# Patient Record
Sex: MALE | Race: WHITE | Employment: OTHER | URBAN - METROPOLITAN AREA
[De-identification: names, ages, dates, MRNs, and addresses within clinical notes are randomized per-mention and may not be internally consistent; named-entity substitution may affect disease eponyms.]

---

## 2019-02-18 ENCOUNTER — OFFICE VISIT (OUTPATIENT)
Dept: ORTHOPEDIC SURGERY | Age: 69
End: 2019-02-18
Payer: MEDICARE

## 2019-02-18 VITALS
HEIGHT: 69 IN | WEIGHT: 200 LBS | HEART RATE: 63 BPM | SYSTOLIC BLOOD PRESSURE: 158 MMHG | TEMPERATURE: 97.6 F | DIASTOLIC BLOOD PRESSURE: 89 MMHG | BODY MASS INDEX: 29.62 KG/M2

## 2019-02-18 DIAGNOSIS — M25.511 RIGHT SHOULDER PAIN, UNSPECIFIED CHRONICITY: Primary | ICD-10-CM

## 2019-02-18 PROCEDURE — 1101F PT FALLS ASSESS-DOCD LE1/YR: CPT | Performed by: ORTHOPAEDIC SURGERY

## 2019-02-18 PROCEDURE — G8427 DOCREV CUR MEDS BY ELIG CLIN: HCPCS | Performed by: ORTHOPAEDIC SURGERY

## 2019-02-18 PROCEDURE — G8419 CALC BMI OUT NRM PARAM NOF/U: HCPCS | Performed by: ORTHOPAEDIC SURGERY

## 2019-02-18 PROCEDURE — 1123F ACP DISCUSS/DSCN MKR DOCD: CPT | Performed by: ORTHOPAEDIC SURGERY

## 2019-02-18 PROCEDURE — 3017F COLORECTAL CA SCREEN DOC REV: CPT | Performed by: ORTHOPAEDIC SURGERY

## 2019-02-18 PROCEDURE — 99203 OFFICE O/P NEW LOW 30 MIN: CPT | Performed by: ORTHOPAEDIC SURGERY

## 2019-02-18 PROCEDURE — G8484 FLU IMMUNIZE NO ADMIN: HCPCS | Performed by: ORTHOPAEDIC SURGERY

## 2019-02-18 PROCEDURE — 1036F TOBACCO NON-USER: CPT | Performed by: ORTHOPAEDIC SURGERY

## 2019-02-18 PROCEDURE — 4040F PNEUMOC VAC/ADMIN/RCVD: CPT | Performed by: ORTHOPAEDIC SURGERY

## 2019-02-18 PROCEDURE — 20610 DRAIN/INJ JOINT/BURSA W/O US: CPT | Performed by: ORTHOPAEDIC SURGERY

## 2019-02-18 RX ORDER — TRIAMCINOLONE ACETONIDE 40 MG/ML
40 INJECTION, SUSPENSION INTRA-ARTICULAR; INTRAMUSCULAR ONCE
Status: COMPLETED | OUTPATIENT
Start: 2019-02-18 | End: 2019-02-18

## 2019-02-18 RX ORDER — FLUOXETINE HYDROCHLORIDE 40 MG/1
40 CAPSULE ORAL DAILY
COMMUNITY
Start: 2018-02-12 | End: 2019-06-26 | Stop reason: SDUPTHER

## 2019-02-18 RX ORDER — ATORVASTATIN CALCIUM 10 MG/1
10 TABLET, FILM COATED ORAL DAILY
COMMUNITY
Start: 2016-12-14 | End: 2019-06-26 | Stop reason: SDUPTHER

## 2019-02-18 RX ORDER — LISINOPRIL 20 MG/1
20 TABLET ORAL 2 TIMES DAILY
COMMUNITY

## 2019-02-18 RX ORDER — LIDOCAINE HYDROCHLORIDE 10 MG/ML
2 INJECTION, SOLUTION INFILTRATION; PERINEURAL ONCE
Status: COMPLETED | OUTPATIENT
Start: 2019-02-18 | End: 2019-02-18

## 2019-02-18 RX ORDER — AMLODIPINE BESYLATE 10 MG/1
10 TABLET ORAL DAILY
COMMUNITY
Start: 2016-03-30 | End: 2019-06-26 | Stop reason: SDUPTHER

## 2019-02-18 RX ORDER — OXYCODONE HYDROCHLORIDE AND ACETAMINOPHEN 5; 325 MG/1; MG/1
1 TABLET ORAL 2 TIMES DAILY
COMMUNITY

## 2019-02-18 RX ORDER — ALBUTEROL SULFATE 90 UG/1
AEROSOL, METERED RESPIRATORY (INHALATION) EVERY 4 HOURS PRN
COMMUNITY

## 2019-02-18 RX ORDER — BUPIVACAINE HYDROCHLORIDE 5 MG/ML
2 INJECTION, SOLUTION PERINEURAL ONCE
Status: COMPLETED | OUTPATIENT
Start: 2019-02-18 | End: 2019-02-18

## 2019-02-18 RX ORDER — BUTALBITAL, ASPIRIN, AND CAFFEINE 325; 50; 40 MG/1; MG/1; MG/1
CAPSULE ORAL
COMMUNITY
End: 2019-04-24

## 2019-02-18 RX ORDER — NIACIN 500 MG
TABLET ORAL
COMMUNITY
End: 2019-04-24

## 2019-02-18 RX ORDER — ALENDRONATE SODIUM 70 MG/1
70 TABLET ORAL
COMMUNITY
Start: 2019-01-07 | End: 2019-06-26 | Stop reason: SINTOL

## 2019-02-18 RX ORDER — ATENOLOL 100 MG/1
100 TABLET ORAL EVERY MORNING
COMMUNITY

## 2019-02-18 RX ADMIN — BUPIVACAINE HYDROCHLORIDE 10 MG: 5 INJECTION, SOLUTION PERINEURAL at 14:37

## 2019-02-18 RX ADMIN — LIDOCAINE HYDROCHLORIDE 2 ML: 10 INJECTION, SOLUTION INFILTRATION; PERINEURAL at 14:37

## 2019-02-18 RX ADMIN — TRIAMCINOLONE ACETONIDE 40 MG: 40 INJECTION, SUSPENSION INTRA-ARTICULAR; INTRAMUSCULAR at 14:37

## 2019-04-01 ENCOUNTER — OFFICE VISIT (OUTPATIENT)
Dept: ORTHOPEDIC SURGERY | Age: 69
End: 2019-04-01
Payer: MEDICARE

## 2019-04-01 VITALS
HEART RATE: 64 BPM | HEIGHT: 69 IN | BODY MASS INDEX: 30.45 KG/M2 | DIASTOLIC BLOOD PRESSURE: 94 MMHG | TEMPERATURE: 98.5 F | SYSTOLIC BLOOD PRESSURE: 172 MMHG | WEIGHT: 205.6 LBS

## 2019-04-01 DIAGNOSIS — M25.511 CHRONIC RIGHT SHOULDER PAIN: Primary | ICD-10-CM

## 2019-04-01 DIAGNOSIS — G89.29 CHRONIC RIGHT SHOULDER PAIN: Primary | ICD-10-CM

## 2019-04-01 PROCEDURE — 1036F TOBACCO NON-USER: CPT | Performed by: ORTHOPAEDIC SURGERY

## 2019-04-01 PROCEDURE — G8427 DOCREV CUR MEDS BY ELIG CLIN: HCPCS | Performed by: ORTHOPAEDIC SURGERY

## 2019-04-01 PROCEDURE — G8417 CALC BMI ABV UP PARAM F/U: HCPCS | Performed by: ORTHOPAEDIC SURGERY

## 2019-04-01 PROCEDURE — 3017F COLORECTAL CA SCREEN DOC REV: CPT | Performed by: ORTHOPAEDIC SURGERY

## 2019-04-01 PROCEDURE — 99213 OFFICE O/P EST LOW 20 MIN: CPT | Performed by: ORTHOPAEDIC SURGERY

## 2019-04-01 PROCEDURE — 1123F ACP DISCUSS/DSCN MKR DOCD: CPT | Performed by: ORTHOPAEDIC SURGERY

## 2019-04-01 PROCEDURE — 4040F PNEUMOC VAC/ADMIN/RCVD: CPT | Performed by: ORTHOPAEDIC SURGERY

## 2019-04-01 NOTE — PROGRESS NOTES
Follow Up Visit     Jose Antonio Myrick returns today for follow up visit regarding Right shoulder pain, with concern for SLAP tear and previous MRI report showing partial thickness rotator cuff tear. He did not have images to review at his last visit. He did get a steroid injection at his last visit on February 18. Tanisha Michael He did note mild improvement, he states that he doesn't feel the \"pins and needles\" and has been doing home exercises. He reports symptoms starting to come back. He has stiffness and pain    Physical Exam:     Height: 5' 9\" (1.753 m), Weight: 205 lb 9.6 oz (93.3 kg) (actual weight 4/1/19), BP: (!) 172/94 (patient states that he took bp meds, denies s/s, stressed)    On exam, forward elevation is about 90° with increased passively to about 130°. Internal rotation is to L1. External rotation is 30°. Mini test reveals pain but no weakness. Speeds test is equivocal. Cass's test is positive for pain deep in the shoulder. Belly press test shows good range of motion with mild pain. There are test reveals mild pain    Controlled Substances Monitoring:      Imaging:  MRI was reviewed on a disc from [facility]. This shows high-grade partial tearing bursal side of the supraspinatus. There is also likely high-grade partial tearing of subscapularis. Maybe some abnormality in the anterior superior labrum consistent with SLAP tear      Assessment: Right shoulder pain with high-grade partial-thickness tears involving the supraspinatus and subscapularis, with probable SLAP tear      Plan:   We discussed his pain today. He just had an injection 6 weeks ago. Not repeat another injection. He elected to continue with home exercises. We also discussed possibility of surgery. He would like to hold off for now.  We will check him back in about 6 weeks to reassess    Kenzie Taylor MD  Orthopaedic Surgery   4/1/19  1:51 PM

## 2019-04-15 LAB
BILIRUBIN, URINE: NORMAL
BILIRUBIN, URINE: NORMAL
BLOOD, URINE: NORMAL
BLOOD, URINE: NORMAL
CLARITY: NORMAL
CLARITY: NORMAL
COLOR: NORMAL
COLOR: NORMAL
GLUCOSE URINE: NORMAL
GLUCOSE URINE: NORMAL
KETONES, URINE: NORMAL
KETONES, URINE: NORMAL
LEUKOCYTE ESTERASE, URINE: NORMAL
LEUKOCYTE ESTERASE, URINE: NORMAL
NITRITE, URINE: NORMAL
NITRITE, URINE: NORMAL
PH UA: NORMAL (ref 4.5–8)
PH UA: NORMAL (ref 4.5–8)
PROTEIN UA: NORMAL
PROTEIN UA: NORMAL
SPECIFIC GRAVITY, URINE: NORMAL
SPECIFIC GRAVITY, URINE: NORMAL
UROBILINOGEN, URINE: NORMAL
UROBILINOGEN, URINE: NORMAL

## 2019-04-24 RX ORDER — ELECTROLYTES/DEXTROSE
1 SOLUTION, ORAL ORAL DAILY
COMMUNITY
End: 2020-01-07 | Stop reason: SDUPTHER

## 2019-04-24 RX ORDER — ACETAMINOPHEN 325 MG/1
650 TABLET ORAL EVERY 6 HOURS PRN
COMMUNITY
End: 2019-09-25

## 2019-04-24 NOTE — PROGRESS NOTES
[facility] PRE-ADMISSION TESTING INSTRUCTIONS    The Preadmission Testing patient is instructed accordingly using the following criteria (check applicable):    ARRIVAL INSTRUCTIONS:  [x] Parking the day of Surgery is located in the Main Entrance lot. Upon entering the door, make an immediate right to the surgery reception desk    [x] Complimentary [place] Parking is available Monday through Friday 6 am to 6 pm    [x] Bring photo ID and insurance card    [] Bring in a copy of Living will or Durable Power of  papers. [x] Please be sure to arrange for responsible adult to provide transportation to and from the hospital    [x] Please arrange for responsible adult to be with you for the 24 hour period post procedure due to having anesthesia      GENERAL INSTRUCTIONS:    [x] Nothing by mouth after midnight, including gum, candy, mints or water    [x] You may brush your teeth, but do not swallow any water    [x] Take medications as instructed with 1-2 oz of water    [x] Stop herbal supplements and vitamins 5 days prior to procedure    [x] Follow preop dosing of blood thinners per physician instructions    [] Take 1/2 dose of evening insulin, but no insulin after midnight    [] No oral diabetic medications after midnight    [] If diabetic and have low blood sugar or feel symptomatic, take 1-2oz apple juice only    [x] Bring inhalers day of surgery    [] Bring C-PAP/ Bi-Pap day of surgery    [] Bring urine specimen day of surgery    [x] Shower or bath with soap, lather and rinse well, AM of Surgery, no lotion, powders or creams to surgical site    [] Follow bowel prep as instructed per surgeon    [x] No tobacco products within 24 hours of surgery     [x] No alcohol or illegal drug use within 24 hours of surgery.     [x] Jewelry, body piercing's, eyeglasses, contact lenses and dentures are not permitted into surgery (bring cases)      [] Please do not wear any nail polish, make up or hair products on the day of surgery    [x] If not already done, you can expect a call from registration    [x] You can expect a call the business day prior to procedure to notify you if your arrival time changes    [x] If you receive a survey after surgery we would greatly appreciate your comments    [] Parent/guardian of a minor must accompany their child and remain on the premises  the entire time they are under our care     [] Pediatric patients may bring favorite toy, blanket or comfort item with them    [] A caregiver or family member must remain with the patient during their stay if they are mentally handicapped, have dementia, disoriented or unable to use a call light or would be a safety concern if left unattended    [x] Please notify surgeon if you develop any illness between now and time of surgery (cold, cough, sore throat, fever, nausea, vomiting) or any signs of infections  including skin, wounds, and dental.    [x]  The Outpatient Pharmacy is available to fill your prescription here on your day of surgery, ask your preop nurse for details    [] Other instructions  EDUCATIONAL MATERIALS PROVIDED:    [] PAT Preoperative Education Packet/Booklet     [] Medication List    [] Fluoroscopy Information Pamphlet    [] Transfusion bracelet applied with instructions    [] Joint replacement video reviewed    [] Shower with soap, lather and rinse well, and use CHG wipes provided the evening before surgery as instructed

## 2019-04-25 ENCOUNTER — ANESTHESIA EVENT (OUTPATIENT)
Dept: OPERATING ROOM | Age: 69
End: 2019-04-25
Payer: MEDICARE

## 2019-04-25 ENCOUNTER — ANESTHESIA (OUTPATIENT)
Dept: OPERATING ROOM | Age: 69
End: 2019-04-25
Payer: MEDICARE

## 2019-04-25 ENCOUNTER — HOSPITAL ENCOUNTER (OUTPATIENT)
Age: 69
Setting detail: OUTPATIENT SURGERY
Discharge: HOME | End: 2019-04-25
Attending: OPHTHALMOLOGY | Admitting: OPHTHALMOLOGY
Payer: MEDICARE

## 2019-04-25 VITALS
HEART RATE: 81 BPM | HEIGHT: 69 IN | DIASTOLIC BLOOD PRESSURE: 87 MMHG | TEMPERATURE: 98.9 F | OXYGEN SATURATION: 95 % | BODY MASS INDEX: 28.88 KG/M2 | WEIGHT: 195 LBS | RESPIRATION RATE: 18 BRPM | SYSTOLIC BLOOD PRESSURE: 162 MMHG

## 2019-04-25 VITALS — OXYGEN SATURATION: 97 % | SYSTOLIC BLOOD PRESSURE: 145 MMHG | DIASTOLIC BLOOD PRESSURE: 74 MMHG

## 2019-04-25 PROCEDURE — 6370000000 HC RX 637 (ALT 250 FOR IP): Performed by: OPHTHALMOLOGY

## 2019-04-25 PROCEDURE — 2720000010 HC SURG SUPPLY STERILE: Performed by: OPHTHALMOLOGY

## 2019-04-25 PROCEDURE — 2500000003 HC RX 250 WO HCPCS: Performed by: OPHTHALMOLOGY

## 2019-04-25 PROCEDURE — 3600000003 HC SURGERY LEVEL 3 BASE: Performed by: OPHTHALMOLOGY

## 2019-04-25 PROCEDURE — 3600000013 HC SURGERY LEVEL 3 ADDTL 15MIN: Performed by: OPHTHALMOLOGY

## 2019-04-25 PROCEDURE — 7100000011 HC PHASE II RECOVERY - ADDTL 15 MIN: Performed by: OPHTHALMOLOGY

## 2019-04-25 PROCEDURE — 3700000001 HC ADD 15 MINUTES (ANESTHESIA): Performed by: OPHTHALMOLOGY

## 2019-04-25 PROCEDURE — 2580000003 HC RX 258: Performed by: NURSE ANESTHETIST, CERTIFIED REGISTERED

## 2019-04-25 PROCEDURE — 3700000000 HC ANESTHESIA ATTENDED CARE: Performed by: OPHTHALMOLOGY

## 2019-04-25 PROCEDURE — 2709999900 HC NON-CHARGEABLE SUPPLY: Performed by: OPHTHALMOLOGY

## 2019-04-25 PROCEDURE — 7100000010 HC PHASE II RECOVERY - FIRST 15 MIN: Performed by: OPHTHALMOLOGY

## 2019-04-25 PROCEDURE — 6360000002 HC RX W HCPCS: Performed by: OPHTHALMOLOGY

## 2019-04-25 PROCEDURE — 6360000002 HC RX W HCPCS: Performed by: NURSE ANESTHETIST, CERTIFIED REGISTERED

## 2019-04-25 RX ORDER — SODIUM CHLORIDE 0.9 % (FLUSH) 0.9 %
10 SYRINGE (ML) INJECTION PRN
Status: CANCELLED | OUTPATIENT
Start: 2019-04-25

## 2019-04-25 RX ORDER — PROPARACAINE HYDROCHLORIDE 5 MG/ML
1 SOLUTION/ DROPS OPHTHALMIC PRN
Status: DISCONTINUED | OUTPATIENT
Start: 2019-04-25 | End: 2019-04-25 | Stop reason: HOSPADM

## 2019-04-25 RX ORDER — ONDANSETRON 2 MG/ML
INJECTION INTRAMUSCULAR; INTRAVENOUS PRN
Status: DISCONTINUED | OUTPATIENT
Start: 2019-04-25 | End: 2019-04-25 | Stop reason: SDUPTHER

## 2019-04-25 RX ORDER — TROPICAMIDE 10 MG/ML
1 SOLUTION/ DROPS OPHTHALMIC PRN
Status: COMPLETED | OUTPATIENT
Start: 2019-04-25 | End: 2019-04-25

## 2019-04-25 RX ORDER — LANOLIN ALCOHOL/MO/W.PET/CERES
500 CREAM (GRAM) TOPICAL NIGHTLY
COMMUNITY

## 2019-04-25 RX ORDER — FENTANYL CITRATE 50 UG/ML
INJECTION, SOLUTION INTRAMUSCULAR; INTRAVENOUS PRN
Status: DISCONTINUED | OUTPATIENT
Start: 2019-04-25 | End: 2019-04-25 | Stop reason: SDUPTHER

## 2019-04-25 RX ORDER — SODIUM CHLORIDE 0.9 % (FLUSH) 0.9 %
10 SYRINGE (ML) INJECTION PRN
Status: DISCONTINUED | OUTPATIENT
Start: 2019-04-25 | End: 2019-04-25 | Stop reason: HOSPADM

## 2019-04-25 RX ORDER — SODIUM CHLORIDE 0.9 % (FLUSH) 0.9 %
10 SYRINGE (ML) INJECTION EVERY 12 HOURS SCHEDULED
Status: CANCELLED | OUTPATIENT
Start: 2019-04-25

## 2019-04-25 RX ORDER — DEXAMETHASONE SODIUM PHOSPHATE 10 MG/ML
INJECTION INTRAMUSCULAR; INTRAVENOUS PRN
Status: DISCONTINUED | OUTPATIENT
Start: 2019-04-25 | End: 2019-04-25 | Stop reason: ALTCHOICE

## 2019-04-25 RX ORDER — CEFAZOLIN SODIUM 1 G/3ML
INJECTION, POWDER, FOR SOLUTION INTRAMUSCULAR; INTRAVENOUS PRN
Status: DISCONTINUED | OUTPATIENT
Start: 2019-04-25 | End: 2019-04-25 | Stop reason: ALTCHOICE

## 2019-04-25 RX ORDER — ATROPINE SULFATE 10 MG/ML
SOLUTION/ DROPS OPHTHALMIC PRN
Status: DISCONTINUED | OUTPATIENT
Start: 2019-04-25 | End: 2019-04-25 | Stop reason: ALTCHOICE

## 2019-04-25 RX ORDER — SODIUM CHLORIDE 0.9 % (FLUSH) 0.9 %
10 SYRINGE (ML) INJECTION EVERY 12 HOURS SCHEDULED
Status: DISCONTINUED | OUTPATIENT
Start: 2019-04-25 | End: 2019-04-25 | Stop reason: HOSPADM

## 2019-04-25 RX ORDER — SODIUM CHLORIDE, SODIUM LACTATE, POTASSIUM CHLORIDE, CALCIUM CHLORIDE 600; 310; 30; 20 MG/100ML; MG/100ML; MG/100ML; MG/100ML
INJECTION, SOLUTION INTRAVENOUS CONTINUOUS PRN
Status: DISCONTINUED | OUTPATIENT
Start: 2019-04-25 | End: 2019-04-25 | Stop reason: SDUPTHER

## 2019-04-25 RX ORDER — CYCLOPENTOLATE HYDROCHLORIDE 10 MG/ML
1 SOLUTION/ DROPS OPHTHALMIC PRN
Status: COMPLETED | OUTPATIENT
Start: 2019-04-25 | End: 2019-04-25

## 2019-04-25 RX ORDER — MIDAZOLAM HYDROCHLORIDE 1 MG/ML
INJECTION INTRAMUSCULAR; INTRAVENOUS PRN
Status: DISCONTINUED | OUTPATIENT
Start: 2019-04-25 | End: 2019-04-25 | Stop reason: SDUPTHER

## 2019-04-25 RX ORDER — PHENYLEPHRINE HCL 2.5 %
1 DROPS OPHTHALMIC (EYE) PRN
Status: COMPLETED | OUTPATIENT
Start: 2019-04-25 | End: 2019-04-25

## 2019-04-25 RX ORDER — PHENYLEPHRINE HYDROCHLORIDE 100 MG/ML
1 SOLUTION/ DROPS OPHTHALMIC PRN
Status: DISCONTINUED | OUTPATIENT
Start: 2019-04-25 | End: 2019-04-25 | Stop reason: HOSPADM

## 2019-04-25 RX ORDER — ONDANSETRON 2 MG/ML
4 INJECTION INTRAMUSCULAR; INTRAVENOUS EVERY 6 HOURS PRN
Status: CANCELLED | OUTPATIENT
Start: 2019-04-25

## 2019-04-25 RX ADMIN — Medication 1 DROP: at 10:26

## 2019-04-25 RX ADMIN — PHENYLEPHRINE HYDROCHLORIDE 1 DROP: 25 SOLUTION/ DROPS OPHTHALMIC at 11:00

## 2019-04-25 RX ADMIN — Medication 1 DROP: at 11:00

## 2019-04-25 RX ADMIN — Medication 1 DROP: at 10:33

## 2019-04-25 RX ADMIN — Medication 1 DROP: at 10:46

## 2019-04-25 RX ADMIN — SODIUM CHLORIDE, POTASSIUM CHLORIDE, SODIUM LACTATE AND CALCIUM CHLORIDE: 600; 310; 30; 20 INJECTION, SOLUTION INTRAVENOUS at 12:00

## 2019-04-25 RX ADMIN — PHENYLEPHRINE HYDROCHLORIDE 1 DROP: 25 SOLUTION/ DROPS OPHTHALMIC at 10:46

## 2019-04-25 RX ADMIN — PHENYLEPHRINE HYDROCHLORIDE 1 DROP: 25 SOLUTION/ DROPS OPHTHALMIC at 10:33

## 2019-04-25 RX ADMIN — MIDAZOLAM HYDROCHLORIDE 0.5 MG: 1 INJECTION, SOLUTION INTRAMUSCULAR; INTRAVENOUS at 12:06

## 2019-04-25 RX ADMIN — MIDAZOLAM HYDROCHLORIDE 1.5 MG: 1 INJECTION, SOLUTION INTRAMUSCULAR; INTRAVENOUS at 12:01

## 2019-04-25 RX ADMIN — FENTANYL CITRATE 75 MCG: 50 INJECTION, SOLUTION INTRAMUSCULAR; INTRAVENOUS at 12:01

## 2019-04-25 RX ADMIN — FENTANYL CITRATE 25 MCG: 50 INJECTION, SOLUTION INTRAMUSCULAR; INTRAVENOUS at 12:05

## 2019-04-25 RX ADMIN — ONDANSETRON HYDROCHLORIDE 4 MG: 2 INJECTION, SOLUTION INTRAMUSCULAR; INTRAVENOUS at 12:12

## 2019-04-25 ASSESSMENT — PAIN DESCRIPTION - ORIENTATION
ORIENTATION: RIGHT

## 2019-04-25 ASSESSMENT — PAIN DESCRIPTION - PAIN TYPE
TYPE: SURGICAL PAIN

## 2019-04-25 ASSESSMENT — PAIN - FUNCTIONAL ASSESSMENT: PAIN_FUNCTIONAL_ASSESSMENT: 0-10

## 2019-04-25 ASSESSMENT — PAIN DESCRIPTION - DESCRIPTORS
DESCRIPTORS: HEADACHE
DESCRIPTORS: HEADACHE

## 2019-04-25 ASSESSMENT — PAIN SCALES - GENERAL
PAINLEVEL_OUTOF10: 0
PAINLEVEL_OUTOF10: 0
PAINLEVEL_OUTOF10: 7
PAINLEVEL_OUTOF10: 0

## 2019-04-25 ASSESSMENT — PAIN DESCRIPTION - LOCATION
LOCATION: EYE

## 2019-04-25 NOTE — ANESTHESIA PRE PROCEDURE
Department of Anesthesiology  Preprocedure Note       Name:  Kulwinder Cardona   Age:  80 y.o.  :  1950                                          MRN:  [id number]         Date:  2019      Surgeon: Arcelia eTe):  Navjot Coker MD    Procedure: RIGHT EYE RETINAL DETACHMENT REPAIR, PARS PLANA VITRECTOMY 25 GAUGE, ENDO LASER GAS FLUID EXCHANGE++LATEX ALLERGY++ (Right Eye)    Medications prior to admission:   Prior to Admission medications    Medication Sig Start Date End Date Taking? Authorizing Provider   NONFORMULARY Take by mouth Acetaminophen 325/butalb 50/cafn 40mg   Yes Historical Provider, MD   niacin 500 MG extended release capsule Take 500 mg by mouth nightly   Yes Historical Provider, MD   acetaminophen (TYLENOL) 325 MG tablet Take 650 mg by mouth every 6 hours as needed for Pain   Yes Historical Provider, MD   aspirin 81 MG tablet Take 81 mg by mouth daily Pt choice. Yes Historical Provider, MD   Multiple Vitamins-Minerals (MULTIVITAMIN ADULT) TABS Take 1 tablet by mouth daily   Yes Historical Provider, MD   albuterol sulfate HFA (PROVENTIL HFA) 108 (90 Base) MCG/ACT inhaler Inhale into the lungs every 4 hours as needed Use am dos if needed and bring dos   Yes Historical Provider, MD   oxyCODONE-acetaminophen (PERCOCET) 5-325 MG per tablet Take 1 tablet by mouth 2 times daily. Headaches.  Take am dos    Yes Historical Provider, MD   lisinopril (PRINIVIL;ZESTRIL) 20 MG tablet Take 20 mg by mouth 2 times daily Take am dos    Yes Historical Provider, MD   alendronate (FOSAMAX) 70 MG tablet Take 70 mg by mouth every 7 days  19  Yes Historical Provider, MD   FLUoxetine (PROZAC) 40 MG capsule Take 40 mg by mouth daily Take am [facility] 18  Yes Historical Provider, MD   atenolol (TENORMIN) 100 MG tablet Take 100 mg by mouth daily Take am dos    Yes Historical Provider, MD   amLODIPine (NORVASC) 10 MG tablet Take 10 mg by mouth daily Take am [facility] 04/25 3/30/16  Yes Historical Provider, MD   atorvastatin (LIPITOR) 10 MG tablet Take 10 mg by mouth daily  12/14/16  Yes Historical Provider, MD       Current medications:    Current Facility-Administered Medications   Medication Dose Route Frequency Provider Last Rate Last Dose    sodium chloride flush 0.9 % injection 10 mL  10 mL Intravenous 2 times per day Jenn Goel MD        sodium chloride flush 0.9 % injection 10 mL  10 mL Intravenous PRN Jenn Goel MD        proparacaine (ALCAINE) 0.5 % ophthalmic solution 1 drop  1 drop Right Eye PRN Jenn Goel MD   1 drop at 04/25/19 1026    phenylephrine (SHASTA-SYNEPHRINE) 10 % ophthalmic solution 1 drop  1 drop Right Eye PRN Jenn Goel MD           Allergies: Allergies   Allergen Reactions    Latex Rash and Other (See Comments)     Rash with burning, pain with adhesives    Fentanyl Nausea And Vomiting and Anxiety       Problem List:  There is no problem list on file for this patient.       Past Medical History:        Diagnosis Date    Asthma     Depression     Hyperlipidemia     Hypertension     Migraine     Osteoporosis     Right retinal detachment 04/2019       Past Surgical History:        Procedure Laterality Date    FACIAL RECONSTRUCTION SURGERY      NECK SURGERY  1994    TONSILLECTOMY AND ADENOIDECTOMY      TOOTH EXTRACTION      WISDOM TOOTH EXTRACTION         Social History:    Social History     Tobacco Use    Smoking status: Never Smoker    Smokeless tobacco: Never Used   Substance Use Topics    Alcohol use: Yes     Comment: occ                                 Counseling given: Not Answered      Vital Signs (Current):   Vitals:    04/24/19 1518 04/25/19 1012 04/25/19 1032 04/25/19 1033   BP:   (!) 160/87    Pulse:   86    Resp:  16 16    Temp:   98.8 °F (37.1 °C)    TempSrc:   Temporal    SpO2:   97%    Weight: 195 lb (88.5 kg)   195 lb (88.5 kg)   Height: 5' 8\" (1.727 m)   5' 9\" (1.753 m)                                              BP Readings from Last use)  Induction: intravenous. Anesthetic plan and risks discussed with patient. Plan discussed with CRNA.           [address],    4/25/2019

## 2019-04-25 NOTE — PROGRESS NOTES
1327 admitted to stage 2 pacu Dr Sd Abbott spoke to pt and his wife .   1334 up to recliner chair tolerating well taking po well

## 2019-04-26 NOTE — OP NOTE
core vitrectomy was then carried out. The vitreous was  already detached. The vitreous was then trimmed in a skirt-like fashion  for 360 degrees with scleral-depressed vitrectomy inferiorly. After  this was done, scleral-depressed examination revealed two small tears at  12 o'clock in attached retina and a larger tear with subretinal fluid  approximately at 1 o'clock. There was a radial tear at 10:30 with  retinal detachment in the superotemporal quadrant of approximately 25%. The macula was on with the fluid just over one disc diameter away from  the central fovea. The break was marked with cautery. Endolaser  was placed around the areas of attached retina, where these retinal  tears were as well as between the vitreous base and ora margo in the  attached area of the retina. After this was done, air-fluid exchange  was performed around the radial tear, which was marked with cautery. The retina was found to flatten very nicely with drainage through the  causative break. After this was done, endolaser was placed around the  radial tear as well as to the vitreous base and the ora margo in the  areas of previously detached retina. After this was done, the retina  was very nicely flattened with a good laser. The trocars were then  removed. The superotemporal and superonasal sclerotomy wounds leaked  with pressure of approximately 15; therefore, suture was placed on each  of these sclerotomies. After this was done and then the inferotemporal  sclerotomy closed on its own at a pressure of approximately 15, there  was a good gas fill of 18% SF6 approximately after 40 mL flush. Subconjunctival dexamethasone and Ancef were injected into the  conjunctiva. TobraDex ointment was placed in the eye. The eye was then  patched and shielded and the patient returned to the recovery room in  stable condition. There were no complications.         Erica Morse MD    D: 04/25/2019 13:19:49       T: 04/25/2019 21:34:48     [email or web address]  Job#: [id number]     Doc#: [id number]    CC:

## 2019-06-26 ENCOUNTER — OFFICE VISIT (OUTPATIENT)
Dept: FAMILY MEDICINE CLINIC | Age: 69
End: 2019-06-26
Payer: MEDICARE

## 2019-06-26 VITALS
HEIGHT: 69 IN | HEART RATE: 68 BPM | BODY MASS INDEX: 29.81 KG/M2 | SYSTOLIC BLOOD PRESSURE: 136 MMHG | DIASTOLIC BLOOD PRESSURE: 80 MMHG | OXYGEN SATURATION: 98 % | WEIGHT: 201.25 LBS | TEMPERATURE: 98.8 F

## 2019-06-26 DIAGNOSIS — M54.50 CHRONIC BILATERAL LOW BACK PAIN WITHOUT SCIATICA: ICD-10-CM

## 2019-06-26 DIAGNOSIS — I10 BENIGN ESSENTIAL HYPERTENSION: Primary | ICD-10-CM

## 2019-06-26 DIAGNOSIS — E78.2 MIXED HYPERLIPIDEMIA: ICD-10-CM

## 2019-06-26 DIAGNOSIS — R51.9 CHRONIC NONINTRACTABLE HEADACHE, UNSPECIFIED HEADACHE TYPE: ICD-10-CM

## 2019-06-26 DIAGNOSIS — G89.29 CHRONIC BILATERAL LOW BACK PAIN WITHOUT SCIATICA: ICD-10-CM

## 2019-06-26 DIAGNOSIS — Z12.11 SCREENING FOR MALIGNANT NEOPLASM OF COLON: ICD-10-CM

## 2019-06-26 DIAGNOSIS — M75.111 NONTRAUMATIC INCOMPLETE TEAR OF RIGHT ROTATOR CUFF: ICD-10-CM

## 2019-06-26 DIAGNOSIS — G89.29 CHRONIC NONINTRACTABLE HEADACHE, UNSPECIFIED HEADACHE TYPE: ICD-10-CM

## 2019-06-26 DIAGNOSIS — G89.4 CHRONIC PAIN DISORDER: ICD-10-CM

## 2019-06-26 DIAGNOSIS — F34.1 DYSTHYMIA: ICD-10-CM

## 2019-06-26 DIAGNOSIS — M81.8 OTHER OSTEOPOROSIS WITHOUT CURRENT PATHOLOGICAL FRACTURE: ICD-10-CM

## 2019-06-26 DIAGNOSIS — M50.90 CERVICAL DISC DISEASE: ICD-10-CM

## 2019-06-26 DIAGNOSIS — N18.2 STAGE 2 CHRONIC KIDNEY DISEASE: ICD-10-CM

## 2019-06-26 PROCEDURE — 99214 OFFICE O/P EST MOD 30 MIN: CPT | Performed by: INTERNAL MEDICINE

## 2019-06-26 RX ORDER — AMLODIPINE BESYLATE 10 MG/1
10 TABLET ORAL DAILY
Qty: 30 TABLET | Refills: 5 | Status: SHIPPED | OUTPATIENT
Start: 2019-06-26 | End: 2020-01-02 | Stop reason: SDUPTHER

## 2019-06-26 RX ORDER — BUTALBITAL, ACETAMINOPHEN AND CAFFEINE 50; 325; 40 MG/1; MG/1; MG/1
1 TABLET ORAL EVERY 4 HOURS PRN
COMMUNITY

## 2019-06-26 RX ORDER — ATORVASTATIN CALCIUM 10 MG/1
10 TABLET, FILM COATED ORAL DAILY
Qty: 30 TABLET | Refills: 5 | Status: SHIPPED | OUTPATIENT
Start: 2019-06-26 | End: 2020-01-02 | Stop reason: SDUPTHER

## 2019-06-26 RX ORDER — ZOLEDRONIC ACID 5 MG/100ML
5 INJECTION, SOLUTION INTRAVENOUS ONCE
Qty: 100 ML | Refills: 0 | Status: SHIPPED | OUTPATIENT
Start: 2019-06-26 | End: 2020-01-07 | Stop reason: ALTCHOICE

## 2019-06-26 RX ORDER — FLUOXETINE HYDROCHLORIDE 40 MG/1
40 CAPSULE ORAL DAILY
Qty: 30 CAPSULE | Refills: 5 | Status: SHIPPED | OUTPATIENT
Start: 2019-06-26 | End: 2020-01-02 | Stop reason: SDUPTHER

## 2019-06-26 RX ORDER — PREDNISOLONE ACETATE 10 MG/ML
1 SUSPENSION/ DROPS OPHTHALMIC DAILY
Refills: 2 | COMMUNITY
Start: 2019-05-26 | End: 2020-06-24

## 2019-06-26 ASSESSMENT — PATIENT HEALTH QUESTIONNAIRE - PHQ9
1. LITTLE INTEREST OR PLEASURE IN DOING THINGS: 1
2. FEELING DOWN, DEPRESSED OR HOPELESS: 1
SUM OF ALL RESPONSES TO PHQ9 QUESTIONS 1 & 2: 2
SUM OF ALL RESPONSES TO PHQ QUESTIONS 1-9: 2
SUM OF ALL RESPONSES TO PHQ QUESTIONS 1-9: 2

## 2019-06-26 ASSESSMENT — ENCOUNTER SYMPTOMS
RHINORRHEA: 0
NAUSEA: 0
SHORTNESS OF BREATH: 0
BLOOD IN STOOL: 0
ABDOMINAL PAIN: 1
VOMITING: 0
DIARRHEA: 0
COUGH: 0
SORE THROAT: 0
CHEST TIGHTNESS: 0
CONSTIPATION: 0
EYE PAIN: 0

## 2019-06-26 NOTE — PROGRESS NOTES
[facility]) Physicians   Internal Medicine     2019  Nighat Vargas : 1950 Sex: male  Age:72 y.o. Chief Complaint   Patient presents with    Eye Problem     detached retina surgery done on 19     Hypertension        HPI:   Patient presents to office for review and management of chronic medical conditions.    - States since last visit had eye surgery for right detached retina. States following with specialist. Using eye drops at moment. - Bone density scan showed osteoporosis of hip. Started fosamax. States stopped - due to stomach pain. Discussed other option in treatment - different bisphosphonate or prolia. Declines. - State pain to bilateral shoulders. States had right shoulder injection per ortho (2018). States had MRI on right, shows partial tears, fluid and degenerative changes. States  had left shoulder injection (2019), [facility]. Xray of elbow and forearm were ok. Improved     - States has chronic pain management. No longer following with pain management. States has had injections to right neck for right shoulder x 3 and States had Radio frequency ablation x 1. States has helped b/l shoulder pain. States has had MRI on cervical spine through [facility] - multiple osteophytes, disc disease and previous c5-6 fusion.    - States still has lower back pain. Unchanged. No recent trauma or injury. No bowel or bladder incontinence. No fever or chills. No numbness, weakness. States pain as sharp shooting. No radicular pain. - States has hypertension. States does check blood pressure at home, blood pressure 120-130's/70-80. Occasional in  140's. - States has depression. On Prozac 40mg - helping. States still having depression, given medical issues and family  issues. States no suicidal thoughts. Discussed counseling - declines. - States has migraines. Up and down. Still asking about stadol.  States has seen neurology - not improved - tried Topamax and Imitrex. Did not tolerate Topamax - switched to amitriptyline - states helped sleeping, also Stopped taking. Taking butalbital/aspirin/caffeine and percocet. - States has asthma and stable with inhaler. States SOB is better. Health Maintenance   - immunizations:   Influenza Vaccination  - (9/14/2015) , (2017)  Pneumonia Vaccination - (10/2016) - prevnar, (10/2017) - pneumococcal 23  Zoster/Shingles Vaccine  Tetanus Vaccination  - (2015) - [facility]    - Screenings:   Bone Density Test Screening - (10/25/2018)  Colonoscopy - (2014) - repeat in 5yrs  Prostate     Couseled on Home Safety - (11/13/2017)    carotid ultrasound - (3/2015) - no significant stenosis    ROS:  Review of Systems   Constitutional: Negative for appetite change, chills, fever and unexpected weight change. HENT: Negative for congestion, rhinorrhea and sore throat. Eyes: Positive for visual disturbance. Negative for pain. Respiratory: Negative for cough, chest tightness and shortness of breath. Cardiovascular: Negative for chest pain and palpitations. Gastrointestinal: Positive for abdominal pain. Negative for blood in stool, constipation, diarrhea, nausea and vomiting. Genitourinary: Negative for difficulty urinating, dysuria, hematuria, scrotal swelling, testicular pain and urgency. Musculoskeletal: Negative for arthralgias and gait problem. Skin: Negative for rash. Neurological: Positive for headaches. Negative for dizziness, syncope, weakness and light-headedness. Hematological: Negative for adenopathy. Does not bruise/bleed easily. Psychiatric/Behavioral: Negative for suicidal ideas. The patient is not nervous/anxious.           Current Outpatient Medications:     butalbital-acetaminophen-caffeine (FIORICET, ESGIC) -40 MG per tablet, Take 1 tablet by mouth every 4 hours as needed for Headaches, Disp: , Rfl:     amLODIPine (NORVASC) 10 MG tablet, Take 1 tablet by mouth daily Take am dos 04/25, Disp: 30 tablet, Rfl: 5    atorvastatin (LIPITOR) 10 MG tablet, Take 1 tablet by mouth daily, Disp: 30 tablet, Rfl: 5    FLUoxetine (PROZAC) 40 MG capsule, Take 1 capsule by mouth daily Take am dos 04/25, Disp: 30 capsule, Rfl: 5    zoledronic acid (RECLAST) 5 MG/100ML SOLN, Infuse 100 mLs intravenously once for 1 dose, Disp: 100 mL, Rfl: 0    niacin 500 MG extended release capsule, Take 500 mg by mouth nightly, Disp: , Rfl:     acetaminophen (TYLENOL) 325 MG tablet, Take 650 mg by mouth every 6 hours as needed for Pain, Disp: , Rfl:     aspirin 81 MG tablet, Take 81 mg by mouth daily Pt choice. , Disp: , Rfl:     Multiple Vitamins-Minerals (MULTIVITAMIN ADULT) TABS, Take 1 tablet by mouth daily, Disp: , Rfl:     albuterol sulfate HFA (PROVENTIL HFA) 108 (90 Base) MCG/ACT inhaler, Inhale into the lungs every 4 hours as needed Use am dos if needed and bring dos, Disp: , Rfl:     oxyCODONE-acetaminophen (PERCOCET) 5-325 MG per tablet, Take 1 tablet by mouth 2 times daily. Headaches.  Take am dos 04/25, Disp: , Rfl:     lisinopril (PRINIVIL;ZESTRIL) 20 MG tablet, Take 20 mg by mouth 2 times daily Take am dos 04/25, Disp: , Rfl:     atenolol (TENORMIN) 100 MG tablet, Take 100 mg by mouth daily Take am dos 04/25, Disp: , Rfl:     prednisoLONE acetate (PRED FORTE) 1 % ophthalmic suspension, INSTILL 1 DROP IN THE RIGHT EYE FOUR TIMES A DAY., Disp: , Rfl: 2    No Known Allergies    Past Medical History:   Diagnosis Date    Asthma     Depression     Hyperlipidemia     Hypertension     Migraine     Osteoporosis     Right retinal detachment 04/2019       Past Surgical History:   Procedure Laterality Date    FACIAL RECONSTRUCTION SURGERY      NECK SURGERY  1994    RETINAL DETACHMENT SURGERY Right 4/25/2019    RIGHT EYE RETINAL DETACHMENT REPAIR, PARS PLANA VITRECTOMY 25 GAUGE, ENDO LASER GAS FLUID EXCHANGE (SF6)++LATEX ALLERGY++ performed by Ivania England MD at [address] [place] [address]  WISDOM TOOTH EXTRACTION         No family history on file. Social History     Socioeconomic History    Marital status:      Spouse name: Not on file    Number of children: Not on file    Years of education: Not on file    Highest education level: Not on file   Occupational History    Not on file   Social Needs    Financial resource strain: Not on file    Food insecurity:     Worry: Not on file     Inability: Not on file    Transportation needs:     Medical: Not on file     Non-medical: Not on file   Tobacco Use    Smoking status: Never Smoker    Smokeless tobacco: Never Used   Substance and Sexual Activity    Alcohol use: Yes     Comment: occ     Drug use: No    Sexual activity: Not on file   Lifestyle    Physical activity:     Days per week: Not on file     Minutes per session: Not on file    Stress: Not on file   Relationships    Social connections:     Talks on phone: Not on file     Gets together: Not on file     Attends [employer] service: Not on file     Active member of club or organization: Not on file     Attends meetings of clubs or organizations: Not on file     Relationship status: Not on file    Intimate partner violence:     Fear of current or ex partner: Not on file     Emotionally abused: Not on file     Physically abused: Not on file     Forced sexual activity: Not on file   Other Topics Concern    Not on file   Social History Narrative    Not on file       Vitals:    06/26/19 1021   BP: 136/80   Pulse: 68   Temp: 98.8 °F (37.1 °C)   SpO2: 98%   Weight: 201 lb 4 oz (91.3 kg)   Height: 5' 9\" (1.753 m)       Exam:  Physical Exam   Constitutional: He is oriented to person, place, and time. He appears well-developed and well-nourished. HENT:   Head: Normocephalic and atraumatic. Right Ear: External ear normal.   Left Ear: External ear normal.   Mouth/Throat: Oropharynx is clear and moist.   Eyes: Pupils are equal, round, and reactive to light.  EOM are normal. Neck: Normal range of motion. Neck supple. No thyromegaly present. Cardiovascular: Normal rate, regular rhythm and normal heart sounds. No murmur heard. Pulmonary/Chest: Effort normal and breath sounds normal. He has no wheezes. He has no rales. Abdominal: Soft. Bowel sounds are normal. There is no tenderness. There is no rebound and no guarding. Musculoskeletal: He exhibits no edema. Right shoulder: He exhibits decreased range of motion. Left shoulder: He exhibits decreased range of motion. Lumbar back: He exhibits decreased range of motion. Lymphadenopathy:     He has no cervical adenopathy. Neurological: He is alert and oriented to person, place, and time. No cranial nerve deficit. Skin: Skin is warm and dry. Psychiatric: He has a normal mood and affect. Judgment normal.       Assessment and Plan:    Markos Wiggins was seen today for eye problem and hypertension.     Diagnoses and all orders for this visit:    Benign essential hypertension  - continue present treatment  - monitor blood pressure at home  - watch diet  - on amlodipine   - on atenolol   - on lisinopril     Nontraumatic incomplete tear of right rotator cuff  - has been following with pain management - injections   - had abnormality of humerus on right, MRI 2018   - [facility]   - Stable     Cervical disc disease  - previous c5-6 surgery  - weakness of forearm  - Stable and unchanged     Chronic nonintractable headache, unspecified headache type  - continue present treatment  - not currently following with neurology  - did not tolerate Topamax  - off amitriptyline  - has asked for stadol - discussed not recommended  - on fiorcet     Chronic pain disorder  - continue present treatment  - no longer following with pain management - do not accept insurance  - has had injections - poss RFA  - has asked for stadol for migraine    Dysthymia  - continue present treatment  - referral to counseling - declined  - did not tolerate zoloft  - off cymbalta  - back on prozac 40mg  - Stable     Chronic bilateral low back pain without sciatica  - following with pain management  - RFA and injections    Mixed hyperlipidemia  - continue present treatment  - watch diet  - on atrovastatin   - follow labs     Stage 2 chronic kidney disease  - stopped meloxicam  - avoid NSAID  - recheck labs    Other osteoporosis without current pathological fracture  - DEXA (11/2018)  - started fosamax - had to stop due to stomach pain, discussed trying another type, switch to prolia or trying reclast infusion.   - would like to explore reclast         Return in about 3 months (around 9/26/2019) for check up and review.       Orders Placed This Encounter   Procedures    POCT Fit Test     Standing Status:   Future     Standing Expiration Date:   6/26/2020       Jeanette Mclaughlin MD  6/26/2019  1:05 PM

## 2019-06-27 ENCOUNTER — TELEPHONE (OUTPATIENT)
Dept: FAMILY MEDICINE CLINIC | Age: 69
End: 2019-06-27

## 2019-06-27 DIAGNOSIS — N18.2 STAGE 2 CHRONIC KIDNEY DISEASE: Primary | ICD-10-CM

## 2019-06-27 DIAGNOSIS — M81.8 IDIOPATHIC OSTEOPOROSIS: ICD-10-CM

## 2019-06-27 RX ORDER — SODIUM CHLORIDE 0.9 % (FLUSH) 0.9 %
10 SYRINGE (ML) INJECTION PRN
Status: CANCELLED | OUTPATIENT
Start: 2019-06-27

## 2019-06-27 RX ORDER — ZOLEDRONIC ACID 5 MG/100ML
5 INJECTION, SOLUTION INTRAVENOUS ONCE
Status: CANCELLED | OUTPATIENT
Start: 2019-06-27

## 2019-07-01 ENCOUNTER — TELEPHONE (OUTPATIENT)
Dept: FAMILY MEDICINE CLINIC | Age: 69
End: 2019-07-01

## 2019-07-05 LAB
BUN BLDV-MCNC: 32 MG/DL
CALCIUM SERPL-MCNC: 8.5 MG/DL
CHLORIDE BLD-SCNC: 107 MMOL/L
CO2: 24 MMOL/L
CREAT SERPL-MCNC: 1.5 MG/DL
GFR CALCULATED: NORMAL
GLUCOSE BLD-MCNC: 106 MG/DL
POTASSIUM SERPL-SCNC: 4.2 MMOL/L
SODIUM BLD-SCNC: 139 MMOL/L

## 2019-07-09 ENCOUNTER — TELEPHONE (OUTPATIENT)
Dept: FAMILY MEDICINE CLINIC | Age: 69
End: 2019-07-09

## 2019-07-09 NOTE — TELEPHONE ENCOUNTER
Please let the patient know that lab received from [facility] showed Mild kidney dysfunction, potassium and other electrolytes were normal. Thanks

## 2019-07-12 NOTE — PROGRESS NOTES
Received a referral for Reclast along with labs. Called to hospital Pharmacist  to have creatinine clearance calculated. Bertha Dougherty  Pharmacist did calculation and I was told creatinine clearance is 47.0

## 2019-07-15 ENCOUNTER — HOSPITAL ENCOUNTER (OUTPATIENT)
Dept: INFUSION THERAPY | Age: 69
Setting detail: INFUSION SERIES
Discharge: HOME | End: 2019-07-15
Payer: MEDICARE

## 2019-07-15 VITALS
DIASTOLIC BLOOD PRESSURE: 78 MMHG | SYSTOLIC BLOOD PRESSURE: 152 MMHG | HEIGHT: 69 IN | RESPIRATION RATE: 16 BRPM | TEMPERATURE: 98.5 F | WEIGHT: 200 LBS | OXYGEN SATURATION: 96 % | BODY MASS INDEX: 29.62 KG/M2 | HEART RATE: 66 BPM

## 2019-07-15 DIAGNOSIS — M81.8 IDIOPATHIC OSTEOPOROSIS: Primary | ICD-10-CM

## 2019-07-15 PROCEDURE — 2580000003 HC RX 258: Performed by: INTERNAL MEDICINE

## 2019-07-15 PROCEDURE — 96365 THER/PROPH/DIAG IV INF INIT: CPT

## 2019-07-15 PROCEDURE — 6360000002 HC RX W HCPCS: Performed by: INTERNAL MEDICINE

## 2019-07-15 RX ORDER — SODIUM CHLORIDE 0.9 % (FLUSH) 0.9 %
10 SYRINGE (ML) INJECTION PRN
Status: CANCELLED | OUTPATIENT
Start: 2019-07-15

## 2019-07-15 RX ORDER — ZOLEDRONIC ACID 5 MG/100ML
5 INJECTION, SOLUTION INTRAVENOUS ONCE
Status: COMPLETED | OUTPATIENT
Start: 2019-07-15 | End: 2019-07-15

## 2019-07-15 RX ORDER — SODIUM CHLORIDE 0.9 % (FLUSH) 0.9 %
10 SYRINGE (ML) INJECTION PRN
Status: DISCONTINUED | OUTPATIENT
Start: 2019-07-15 | End: 2019-07-16 | Stop reason: HOSPADM

## 2019-07-15 RX ORDER — ZOLEDRONIC ACID 5 MG/100ML
5 INJECTION, SOLUTION INTRAVENOUS ONCE
Status: CANCELLED | OUTPATIENT
Start: 2019-07-15

## 2019-07-15 RX ADMIN — ZOLEDRONIC ACID 5 MG: 5 INJECTION, SOLUTION INTRAVENOUS at 10:23

## 2019-07-15 RX ADMIN — Medication 10 ML: at 10:42

## 2019-07-15 RX ADMIN — Medication 10 ML: at 10:41

## 2019-07-15 RX ADMIN — Medication 10 ML: at 10:15

## 2019-07-15 ASSESSMENT — PAIN DESCRIPTION - LOCATION: LOCATION: BACK;NECK;SHOULDER

## 2019-07-15 ASSESSMENT — PAIN DESCRIPTION - FREQUENCY: FREQUENCY: CONTINUOUS

## 2019-07-15 ASSESSMENT — PAIN DESCRIPTION - ONSET: ONSET: ON-GOING

## 2019-07-15 ASSESSMENT — PAIN SCALES - GENERAL: PAINLEVEL_OUTOF10: 6

## 2019-07-15 ASSESSMENT — PAIN DESCRIPTION - ORIENTATION: ORIENTATION: RIGHT;LEFT

## 2019-07-15 ASSESSMENT — PAIN DESCRIPTION - PAIN TYPE: TYPE: CHRONIC PAIN

## 2019-07-17 ENCOUNTER — TELEPHONE (OUTPATIENT)
Dept: FAMILY MEDICINE CLINIC | Age: 69
End: 2019-07-17

## 2019-08-07 DIAGNOSIS — R19.5 POSITIVE FIT (FECAL IMMUNOCHEMICAL TEST): Primary | ICD-10-CM

## 2019-08-07 DIAGNOSIS — Z12.11 SCREENING FOR MALIGNANT NEOPLASM OF COLON: ICD-10-CM

## 2019-08-07 LAB
CONTROL: ABNORMAL
HEMOCCULT STL QL: POSITIVE

## 2019-08-07 PROCEDURE — 82274 ASSAY TEST FOR BLOOD FECAL: CPT | Performed by: INTERNAL MEDICINE

## 2019-09-25 ENCOUNTER — OFFICE VISIT (OUTPATIENT)
Dept: FAMILY MEDICINE CLINIC | Age: 69
End: 2019-09-25
Payer: MEDICARE

## 2019-09-25 ENCOUNTER — HOSPITAL ENCOUNTER (OUTPATIENT)
Age: 69
Discharge: HOME | End: 2019-09-27
Payer: MEDICARE

## 2019-09-25 VITALS
HEART RATE: 58 BPM | TEMPERATURE: 98.1 F | BODY MASS INDEX: 29.55 KG/M2 | WEIGHT: 199.5 LBS | HEIGHT: 69 IN | SYSTOLIC BLOOD PRESSURE: 148 MMHG | OXYGEN SATURATION: 98 % | DIASTOLIC BLOOD PRESSURE: 88 MMHG

## 2019-09-25 DIAGNOSIS — G89.4 CHRONIC PAIN DISORDER: ICD-10-CM

## 2019-09-25 DIAGNOSIS — R19.5 POSITIVE FIT (FECAL IMMUNOCHEMICAL TEST): Primary | ICD-10-CM

## 2019-09-25 DIAGNOSIS — R10.84 GENERALIZED ABDOMINAL PAIN: ICD-10-CM

## 2019-09-25 DIAGNOSIS — N18.2 STAGE 2 CHRONIC KIDNEY DISEASE: ICD-10-CM

## 2019-09-25 DIAGNOSIS — G89.29 CHRONIC NONINTRACTABLE HEADACHE, UNSPECIFIED HEADACHE TYPE: ICD-10-CM

## 2019-09-25 DIAGNOSIS — M81.8 OTHER OSTEOPOROSIS WITHOUT CURRENT PATHOLOGICAL FRACTURE: ICD-10-CM

## 2019-09-25 DIAGNOSIS — M54.50 CHRONIC BILATERAL LOW BACK PAIN WITHOUT SCIATICA: ICD-10-CM

## 2019-09-25 DIAGNOSIS — E78.2 MIXED HYPERLIPIDEMIA: ICD-10-CM

## 2019-09-25 DIAGNOSIS — M75.111 NONTRAUMATIC INCOMPLETE TEAR OF RIGHT ROTATOR CUFF: ICD-10-CM

## 2019-09-25 DIAGNOSIS — F34.1 DYSTHYMIA: ICD-10-CM

## 2019-09-25 DIAGNOSIS — R51.9 CHRONIC NONINTRACTABLE HEADACHE, UNSPECIFIED HEADACHE TYPE: ICD-10-CM

## 2019-09-25 DIAGNOSIS — Z01.818 PREOP EXAM FOR INTERNAL MEDICINE: ICD-10-CM

## 2019-09-25 DIAGNOSIS — M50.90 CERVICAL DISC DISEASE: ICD-10-CM

## 2019-09-25 DIAGNOSIS — I10 BENIGN ESSENTIAL HYPERTENSION: ICD-10-CM

## 2019-09-25 DIAGNOSIS — M81.8 IDIOPATHIC OSTEOPOROSIS: ICD-10-CM

## 2019-09-25 DIAGNOSIS — G89.29 CHRONIC BILATERAL LOW BACK PAIN WITHOUT SCIATICA: ICD-10-CM

## 2019-09-25 LAB
ALBUMIN SERPL-MCNC: 4.6 G/DL (ref 3.5–5.2)
ALP BLD-CCNC: 43 U/L (ref 40–129)
ALT SERPL-CCNC: 19 U/L (ref 0–40)
ANION GAP SERPL CALCULATED.3IONS-SCNC: 20 MMOL/L (ref 7–16)
AST SERPL-CCNC: 22 U/L (ref 0–39)
BASOPHILS ABSOLUTE: 0.1 E9/L (ref 0–0.2)
BASOPHILS RELATIVE PERCENT: 1 % (ref 0–2)
BILIRUB SERPL-MCNC: 0.3 MG/DL (ref 0–1.2)
BUN BLDV-MCNC: 20 MG/DL (ref 8–23)
CALCIUM SERPL-MCNC: 9.9 MG/DL (ref 8.6–10.2)
CHLORIDE BLD-SCNC: 101 MMOL/L (ref 98–107)
CO2: 21 MMOL/L (ref 22–29)
CREAT SERPL-MCNC: 1.3 MG/DL (ref 0.7–1.2)
EOSINOPHILS ABSOLUTE: 0.73 E9/L (ref 0.05–0.5)
EOSINOPHILS RELATIVE PERCENT: 7.4 % (ref 0–6)
GFR AFRICAN AMERICAN: >60
GFR NON-AFRICAN AMERICAN: 55 ML/MIN/1.73
GLUCOSE BLD-MCNC: 97 MG/DL (ref 74–99)
HCT VFR BLD CALC: 49.1 % (ref 37–54)
HEMOGLOBIN: 16 G/DL (ref 12.5–16.5)
IMMATURE GRANULOCYTES #: 0.04 E9/L
IMMATURE GRANULOCYTES %: 0.4 % (ref 0–5)
LYMPHOCYTES ABSOLUTE: 1.73 E9/L (ref 1.5–4)
LYMPHOCYTES RELATIVE PERCENT: 17.7 % (ref 20–42)
MAGNESIUM: 2.4 MG/DL (ref 1.6–2.6)
MCH RBC QN AUTO: 31.4 PG (ref 26–35)
MCHC RBC AUTO-ENTMCNC: 32.6 % (ref 32–34.5)
MCV RBC AUTO: 96.3 FL (ref 80–99.9)
MONOCYTES ABSOLUTE: 0.75 E9/L (ref 0.1–0.95)
MONOCYTES RELATIVE PERCENT: 7.7 % (ref 2–12)
NEUTROPHILS ABSOLUTE: 6.45 E9/L (ref 1.8–7.3)
NEUTROPHILS RELATIVE PERCENT: 65.8 % (ref 43–80)
PDW BLD-RTO: 13.1 FL (ref 11.5–15)
PLATELET # BLD: 246 E9/L (ref 130–450)
PMV BLD AUTO: 9.3 FL (ref 7–12)
POTASSIUM SERPL-SCNC: 5.9 MMOL/L (ref 3.5–5)
RBC # BLD: 5.1 E12/L (ref 3.8–5.8)
SODIUM BLD-SCNC: 142 MMOL/L (ref 132–146)
TOTAL PROTEIN: 7.3 G/DL (ref 6.4–8.3)
WBC # BLD: 9.8 E9/L (ref 4.5–11.5)

## 2019-09-25 PROCEDURE — 4040F PNEUMOC VAC/ADMIN/RCVD: CPT | Performed by: INTERNAL MEDICINE

## 2019-09-25 PROCEDURE — 83735 ASSAY OF MAGNESIUM: CPT

## 2019-09-25 PROCEDURE — G8427 DOCREV CUR MEDS BY ELIG CLIN: HCPCS | Performed by: INTERNAL MEDICINE

## 2019-09-25 PROCEDURE — G8417 CALC BMI ABV UP PARAM F/U: HCPCS | Performed by: INTERNAL MEDICINE

## 2019-09-25 PROCEDURE — 1123F ACP DISCUSS/DSCN MKR DOCD: CPT | Performed by: INTERNAL MEDICINE

## 2019-09-25 PROCEDURE — 80053 COMPREHEN METABOLIC PANEL: CPT

## 2019-09-25 PROCEDURE — 85025 COMPLETE CBC W/AUTO DIFF WBC: CPT

## 2019-09-25 PROCEDURE — 99214 OFFICE O/P EST MOD 30 MIN: CPT | Performed by: INTERNAL MEDICINE

## 2019-09-25 PROCEDURE — 36415 COLL VENOUS BLD VENIPUNCTURE: CPT

## 2019-09-25 PROCEDURE — 1036F TOBACCO NON-USER: CPT | Performed by: INTERNAL MEDICINE

## 2019-09-25 PROCEDURE — 3017F COLORECTAL CA SCREEN DOC REV: CPT | Performed by: INTERNAL MEDICINE

## 2019-09-25 RX ORDER — OMEPRAZOLE 40 MG/1
40 CAPSULE, DELAYED RELEASE ORAL
Qty: 30 CAPSULE | Refills: 0 | Status: SHIPPED | OUTPATIENT
Start: 2019-09-25 | End: 2019-10-16 | Stop reason: SDUPTHER

## 2019-09-25 ASSESSMENT — ENCOUNTER SYMPTOMS
VOMITING: 0
SORE THROAT: 0
EYE PAIN: 0
COUGH: 0
RHINORRHEA: 0
NAUSEA: 0
CHEST TIGHTNESS: 0
ABDOMINAL PAIN: 1
DIARRHEA: 0
CONSTIPATION: 0
SHORTNESS OF BREATH: 0
BLOOD IN STOOL: 0

## 2019-09-25 NOTE — PROGRESS NOTES
[facility]) Physicians   Internal Medicine     2019  Dorinda Herron : 1950 Sex: male  Age:74 y.o. Chief Complaint   Patient presents with    Pre-op Exam     Right eye cataract Sx 10/11/19     GI Problem     Sharp pains on and off for months. Denies N & V.        HPI:   Patient presents to office for review and management of chronic medical conditions.    - States need preop for cataract     - States having generalized abdominal pain. States comes and goes. States sharp, no radiation. States heating pad makes better. States uncertain what makes it worse. Some nausea, no vomiting. No diarrhea or constipation. No melena or hematochezia, No dysuria. No frequency, urgency. No hematuria. No fever or chills. - States since last visit had eye surgery for right detached retina. States following with specialist. Using eye drops at moment. - Bone density scan showed osteoporosis of hip. Started fosamax. States stopped - due to stomach pain. Discussed other option in treatment - different bisphosphonate or prolia. Declines. - State pain to bilateral shoulders. States had right shoulder injection per ortho (2018). States had MRI on right, shows partial tears, fluid and degenerative changes. States  had left shoulder injection (2019), [facility] Xray of elbow and forearm were ok. Improved     - States has chronic pain management. No longer following with pain management. [facility] has had injections to right neck for right shoulder x 3 and States had Radio frequency ablation x 1. [facility] has helped b/l shoulder pain. [facility] has had MRI on cervical spine through [place] - multiple osteophytes, disc disease and previous c5-6 fusion.    - States still has lower back pain. Unchanged. No recent trauma or injury. No bowel or bladder incontinence. No fever or chills. No numbness, weakness. States pain as sharp shooting. No radicular pain. - States has hypertension.  States does check

## 2019-09-26 ENCOUNTER — TELEPHONE (OUTPATIENT)
Dept: FAMILY MEDICINE CLINIC | Age: 69
End: 2019-09-26

## 2019-09-27 NOTE — TELEPHONE ENCOUNTER
[id number] Diana Montgomery thank you!   Sorry for the inconvenience but did not want to take a chance bonny with abdominal pain

## 2019-10-04 ENCOUNTER — TELEPHONE (OUTPATIENT)
Dept: FAMILY MEDICINE CLINIC | Age: 69
End: 2019-10-04

## 2019-10-04 DIAGNOSIS — R10.84 GENERALIZED ABDOMINAL PAIN: ICD-10-CM

## 2019-10-04 DIAGNOSIS — R19.5 POSITIVE FIT (FECAL IMMUNOCHEMICAL TEST): ICD-10-CM

## 2019-10-08 LAB
ALBUMIN SERPL-MCNC: 3.9 G/DL
ALP BLD-CCNC: 45 U/L
ALT SERPL-CCNC: 27 U/L
ANION GAP SERPL CALCULATED.3IONS-SCNC: 10 MMOL/L
AST SERPL-CCNC: 17 U/L
BASOPHILS ABSOLUTE: 0.1 /ΜL
BASOPHILS RELATIVE PERCENT: 1 %
BILIRUB SERPL-MCNC: 1 MG/DL (ref 0.1–1.4)
BUN BLDV-MCNC: 17 MG/DL
CALCIUM SERPL-MCNC: 8.7 MG/DL
CHLORIDE BLD-SCNC: 109 MMOL/L
CO2: 28 MMOL/L
CREAT SERPL-MCNC: 1.1 MG/DL
EOSINOPHILS ABSOLUTE: 0.8 /ΜL
EOSINOPHILS RELATIVE PERCENT: 8.3 %
GFR CALCULATED: 67
GLUCOSE BLD-MCNC: 92 MG/DL
HCT VFR BLD CALC: 44.6 % (ref 41–53)
HEMOGLOBIN: 15.3 G/DL (ref 13.5–17.5)
LYMPHOCYTES ABSOLUTE: 1.5 /ΜL
LYMPHOCYTES RELATIVE PERCENT: 15.8 %
MCH RBC QN AUTO: 31.3 PG
MCHC RBC AUTO-ENTMCNC: 34.3 G/DL
MCV RBC AUTO: 91.1 FL
MONOCYTES ABSOLUTE: 0.6 /ΜL
MONOCYTES RELATIVE PERCENT: 6.3 %
NEUTROPHILS ABSOLUTE: 6.3 /ΜL
NEUTROPHILS RELATIVE PERCENT: 68.6 %
PDW BLD-RTO: 13.3 %
PLATELET # BLD: 232 K/ΜL
PMV BLD AUTO: 8.1 FL
POTASSIUM SERPL-SCNC: 4.5 MMOL/L
RBC # BLD: 4.89 10^6/ΜL
SODIUM BLD-SCNC: 142 MMOL/L
TOTAL PROTEIN: 6.4
WBC # BLD: 9.2 10^3/ML

## 2019-10-09 LAB
6-ACETYLMORPHINE, UR: NORMAL
AMPHETAMINE SCREEN, URINE: NORMAL
BARBITURATE SCREEN, URINE: NORMAL
BENZODIAZEPINE SCREEN, URINE: NORMAL
CANNABINOID SCREEN URINE: NORMAL
COCAINE METABOLITE, URINE: NORMAL
CREATININE URINE: NORMAL
EDDP, URINE: NORMAL
ETHANOL URINE: NORMAL
MDMA URINE: NORMAL
METHADONE SCREEN, URINE: NORMAL
METHAMPHETAMINE, URINE: NORMAL
OPIATES, URINE: NORMAL
OXYCODONE: NORMAL
PCP: NORMAL
PH, URINE: NORMAL
PHENCYCLIDINE, URINE: NORMAL
PROPOXYPHENE, URINE: NORMAL
TRICYCLIC ANTIDEPRESSANTS, UR: NORMAL

## 2019-10-16 DIAGNOSIS — R19.5 POSITIVE FIT (FECAL IMMUNOCHEMICAL TEST): ICD-10-CM

## 2019-10-16 DIAGNOSIS — R10.84 GENERALIZED ABDOMINAL PAIN: ICD-10-CM

## 2019-10-16 RX ORDER — OMEPRAZOLE 40 MG/1
40 CAPSULE, DELAYED RELEASE ORAL
Qty: 30 CAPSULE | Refills: 1 | Status: SHIPPED | OUTPATIENT
Start: 2019-10-16 | End: 2019-10-16 | Stop reason: SDUPTHER

## 2019-10-16 RX ORDER — OMEPRAZOLE 40 MG/1
40 CAPSULE, DELAYED RELEASE ORAL
Qty: 30 CAPSULE | Refills: 1 | Status: SHIPPED | OUTPATIENT
Start: 2019-10-16 | End: 2019-12-18 | Stop reason: SDUPTHER

## 2019-10-24 ENCOUNTER — OFFICE VISIT (OUTPATIENT)
Dept: SURGERY | Age: 69
End: 2019-10-24
Payer: MEDICARE

## 2019-10-24 VITALS
DIASTOLIC BLOOD PRESSURE: 87 MMHG | SYSTOLIC BLOOD PRESSURE: 138 MMHG | WEIGHT: 199 LBS | BODY MASS INDEX: 29.47 KG/M2 | RESPIRATION RATE: 18 BRPM | HEART RATE: 79 BPM | TEMPERATURE: 98 F | HEIGHT: 69 IN

## 2019-10-24 DIAGNOSIS — K29.80 DUODENITIS: Primary | ICD-10-CM

## 2019-10-24 DIAGNOSIS — R19.5 POSITIVE FIT (FECAL IMMUNOCHEMICAL TEST): ICD-10-CM

## 2019-10-24 DIAGNOSIS — K40.20 NON-RECURRENT BILATERAL INGUINAL HERNIA WITHOUT OBSTRUCTION OR GANGRENE: ICD-10-CM

## 2019-10-24 PROCEDURE — 1123F ACP DISCUSS/DSCN MKR DOCD: CPT | Performed by: SURGERY

## 2019-10-24 PROCEDURE — 99203 OFFICE O/P NEW LOW 30 MIN: CPT | Performed by: SURGERY

## 2019-10-24 PROCEDURE — G8484 FLU IMMUNIZE NO ADMIN: HCPCS | Performed by: SURGERY

## 2019-10-24 PROCEDURE — 1036F TOBACCO NON-USER: CPT | Performed by: SURGERY

## 2019-10-24 PROCEDURE — G8417 CALC BMI ABV UP PARAM F/U: HCPCS | Performed by: SURGERY

## 2019-10-24 PROCEDURE — 4040F PNEUMOC VAC/ADMIN/RCVD: CPT | Performed by: SURGERY

## 2019-10-24 PROCEDURE — 3017F COLORECTAL CA SCREEN DOC REV: CPT | Performed by: SURGERY

## 2019-10-24 PROCEDURE — G8427 DOCREV CUR MEDS BY ELIG CLIN: HCPCS | Performed by: SURGERY

## 2019-10-25 ENCOUNTER — TELEPHONE (OUTPATIENT)
Dept: SURGERY | Age: 69
End: 2019-10-25

## 2019-10-25 PROBLEM — Z01.818 PREOP EXAM FOR INTERNAL MEDICINE: Status: RESOLVED | Noted: 2019-09-25 | Resolved: 2019-10-25

## 2019-10-28 ENCOUNTER — CLINICAL DOCUMENTATION (OUTPATIENT)
Dept: SURGERY | Age: 69
End: 2019-10-28

## 2019-12-18 DIAGNOSIS — R19.5 POSITIVE FIT (FECAL IMMUNOCHEMICAL TEST): ICD-10-CM

## 2019-12-18 DIAGNOSIS — R10.84 GENERALIZED ABDOMINAL PAIN: ICD-10-CM

## 2019-12-18 RX ORDER — OMEPRAZOLE 40 MG/1
40 CAPSULE, DELAYED RELEASE ORAL
Qty: 30 CAPSULE | Refills: 0 | Status: SHIPPED | OUTPATIENT
Start: 2019-12-18 | End: 2020-01-07 | Stop reason: SDUPTHER

## 2020-01-02 RX ORDER — AMLODIPINE BESYLATE 10 MG/1
10 TABLET ORAL DAILY
Qty: 30 TABLET | Refills: 0 | Status: SHIPPED | OUTPATIENT
Start: 2020-01-02 | End: 2020-01-07 | Stop reason: SDUPTHER

## 2020-01-02 RX ORDER — FLUOXETINE HYDROCHLORIDE 40 MG/1
40 CAPSULE ORAL DAILY
Qty: 30 CAPSULE | Refills: 0 | Status: SHIPPED | OUTPATIENT
Start: 2020-01-02 | End: 2020-01-07 | Stop reason: SDUPTHER

## 2020-01-02 RX ORDER — ATORVASTATIN CALCIUM 10 MG/1
10 TABLET, FILM COATED ORAL DAILY
Qty: 30 TABLET | Refills: 0 | Status: SHIPPED | OUTPATIENT
Start: 2020-01-02 | End: 2020-01-07 | Stop reason: SDUPTHER

## 2020-01-02 NOTE — TELEPHONE ENCOUNTER
Last Appointment:  9/25/2019  Future Appointments   Date Time Provider Xiao Ty   1/7/2020  9:45 AM Neymar Arenas  [address]   1/31/2020  9:30 AM Gina Souza MD [facility]      Patient's wife calling in to request a refill on Lipitor, Prozac, and Norvasc to last patient until his appt.  Order pended, thank you

## 2020-01-07 ENCOUNTER — OFFICE VISIT (OUTPATIENT)
Dept: FAMILY MEDICINE CLINIC | Age: 70
End: 2020-01-07
Payer: MEDICARE

## 2020-01-07 VITALS
TEMPERATURE: 98.7 F | BODY MASS INDEX: 30.51 KG/M2 | DIASTOLIC BLOOD PRESSURE: 76 MMHG | HEIGHT: 69 IN | OXYGEN SATURATION: 96 % | SYSTOLIC BLOOD PRESSURE: 124 MMHG | WEIGHT: 206 LBS | HEART RATE: 66 BPM

## 2020-01-07 PROCEDURE — 1036F TOBACCO NON-USER: CPT | Performed by: INTERNAL MEDICINE

## 2020-01-07 PROCEDURE — 1123F ACP DISCUSS/DSCN MKR DOCD: CPT | Performed by: INTERNAL MEDICINE

## 2020-01-07 PROCEDURE — 4040F PNEUMOC VAC/ADMIN/RCVD: CPT | Performed by: INTERNAL MEDICINE

## 2020-01-07 PROCEDURE — 3017F COLORECTAL CA SCREEN DOC REV: CPT | Performed by: INTERNAL MEDICINE

## 2020-01-07 PROCEDURE — G8482 FLU IMMUNIZE ORDER/ADMIN: HCPCS | Performed by: INTERNAL MEDICINE

## 2020-01-07 PROCEDURE — 99214 OFFICE O/P EST MOD 30 MIN: CPT | Performed by: INTERNAL MEDICINE

## 2020-01-07 PROCEDURE — G8427 DOCREV CUR MEDS BY ELIG CLIN: HCPCS | Performed by: INTERNAL MEDICINE

## 2020-01-07 PROCEDURE — G8417 CALC BMI ABV UP PARAM F/U: HCPCS | Performed by: INTERNAL MEDICINE

## 2020-01-07 RX ORDER — ATORVASTATIN CALCIUM 10 MG/1
10 TABLET, FILM COATED ORAL DAILY
Qty: 30 TABLET | Refills: 5 | Status: SHIPPED
Start: 2020-01-07 | End: 2020-06-24 | Stop reason: SDUPTHER

## 2020-01-07 RX ORDER — OMEPRAZOLE 40 MG/1
40 CAPSULE, DELAYED RELEASE ORAL
Qty: 30 CAPSULE | Refills: 5 | Status: SHIPPED
Start: 2020-01-07 | End: 2020-06-24 | Stop reason: SDUPTHER

## 2020-01-07 RX ORDER — FLUOXETINE HYDROCHLORIDE 40 MG/1
40 CAPSULE ORAL DAILY
Qty: 30 CAPSULE | Refills: 5 | Status: SHIPPED
Start: 2020-01-07 | End: 2020-06-24 | Stop reason: SDUPTHER

## 2020-01-07 RX ORDER — AMLODIPINE BESYLATE 10 MG/1
10 TABLET ORAL DAILY
Qty: 30 TABLET | Refills: 5 | Status: SHIPPED
Start: 2020-01-07 | End: 2020-06-24 | Stop reason: SDUPTHER

## 2020-01-07 ASSESSMENT — ENCOUNTER SYMPTOMS
VOMITING: 0
NAUSEA: 0
CONSTIPATION: 0
ABDOMINAL PAIN: 1
CHEST TIGHTNESS: 0
DIARRHEA: 0
EYE PAIN: 0
SORE THROAT: 0
COUGH: 0
BLOOD IN STOOL: 0
RHINORRHEA: 0
SHORTNESS OF BREATH: 0

## 2020-01-07 NOTE — PROGRESS NOTES
[facility] PRE-ADMISSION TESTING INSTRUCTIONS    The Preadmission Testing patient is instructed accordingly using the following criteria (check applicable):    ARRIVAL INSTRUCTIONS:  [x] Parking the day of Surgery is located in the Main Entrance lot. Upon entering the door, make an immediate right to the surgery reception desk    [] [phone] is available Monday through Friday 6 am to 6 pm    [x] Bring photo ID and insurance card    [] Bring in a copy of Living will or Durable Power of  papers. [x] Please be sure to arrange for responsible adult to provide transportation to and from the hospital    [x] Please arrange for responsible adult to be with you for the 24 hour period post procedure due to having anesthesia      GENERAL INSTRUCTIONS:    [x] Nothing by mouth after midnight, including gum, candy, mints or water    [x] You may brush your teeth, but do not swallow any water    [x] Take medications as instructed with 1-2 oz of water    [x] Stop herbal supplements and vitamins 5 days prior to procedure    [x] Follow preop dosing of blood thinners per physician instructions    [] Take 1/2 dose of evening insulin, but no insulin after midnight    [] No oral diabetic medications after midnight    [] If diabetic and have low blood sugar or feel symptomatic, take 1-2oz apple juice only    [x] Bring inhalers day of surgery    [] Bring C-PAP/ Bi-Pap day of surgery    [] Bring urine specimen day of surgery    [x] Shower or bath with soap, lather and rinse well, AM of Surgery, no lotion, powders or creams to surgical site    [x] Follow bowel prep as instructed per surgeon    [x] No tobacco products within 24 hours of surgery     [x] No alcohol or illegal drug use within 24 hours of surgery.     [x] Jewelry, body piercing's, eyeglasses, contact lenses and dentures are not permitted into surgery (bring cases)      [] Please do not wear any nail polish, make up or hair

## 2020-01-07 NOTE — PROGRESS NOTES
[facility]) Physicians   Internal Medicine     2020  Siddhartha Early : 1950 Sex: male  Age:74 y.o. Chief Complaint   Patient presents with    Hypertension    Other     Scheduled for an EGD and Colonoscopy 20        HPI:   Patient presents to office for review and management of chronic medical conditions.    - States s/p cataract     - States having generalized abdominal pain, has improved. Added omeprazole. States comes and goes. States sharp, no radiation. States heating pad makes better. States uncertain what makes it worse. No nausea, no vomiting. No diarrhea or constipation. No melena or hematochezia, No dysuria. No frequency, urgency. No hematuria. No fever or chills. - States had right detached retina. States following with specialist. Using eye drops at moment. - Bone density scan showed osteoporosis of hip. Started fosamax. States stopped - due to stomach pain. Discussed other option in treatment - different bisphosphonate or prolia. Declines. - State pain to bilateral shoulders. States had right shoulder injection per ortho (2018). States had MRI on right, shows partial tears, fluid and degenerative changes. States  had left shoulder injection (2019), [facility]     - [facility] has seen chronic pain management, not currently following. [facility] has had injections to right neck for right shoulder x 3 and States had Radio frequency ablation x 1. [facility] has helped b/l shoulder pain. [facility] has had MRI on cervical spine through [facility] - multiple osteophytes, disc disease and previous c5-6 fusion.    - States still has lower back pain. Unchanged. No recent trauma or injury. No bowel or bladder incontinence. No fever or chills. No numbness, weakness. States pain as sharp shooting. No radicular pain. - States has hypertension. States does check blood pressure at home, blood pressure 120-130's/70-80. Occasional in 140's. - States has depression.  On Prozac RETINAL DETACHMENT REPAIR, PARS PLANA VITRECTOMY 25 GAUGE, ENDO LASER GAS FLUID EXCHANGE (SF6)++LATEX ALLERGY++ performed by Kristofer Keith MD at [address] EXTRACTION      WISDOM TOOTH EXTRACTION         No family history on file. Social History     Socioeconomic History    Marital status:      Spouse name: Not on file    Number of children: Not on file    Years of education: Not on file    Highest education level: Not on file   Occupational History    Not on file   Social Needs    Financial resource strain: Not on file    Food insecurity:     Worry: Not on file     Inability: Not on file    Transportation needs:     Medical: Not on file     Non-medical: Not on file   Tobacco Use    Smoking status: Never Smoker    Smokeless tobacco: Never Used   Substance and Sexual Activity    Alcohol use: Yes     Comment: occ     Drug use: No    Sexual activity: Not on file   Lifestyle    Physical activity:     Days per week: Not on file     Minutes per session: Not on file    Stress: Not on file   Relationships    Social connections:     Talks on phone: Not on file     Gets together: Not on file     Attends Christianity service: Not on file     Active member of club or organization: Not on file     Attends meetings of clubs or organizations: Not on file     Relationship status: Not on file    Intimate partner violence:     Fear of current or ex partner: Not on file     Emotionally abused: Not on file     Physically abused: Not on file     Forced sexual activity: Not on file   Other Topics Concern    Not on file   Social History Narrative    Not on file       Vitals:    01/07/20 0935   BP: 124/76   Site: Left Upper Arm   Cuff Size: Large Adult   Pulse: 66   Temp: 98.7 °F (37.1 °C)   SpO2: 96%   Weight: 206 lb (93.4 kg)   Height: 5' 9\" (1.753 m)       Exam:  Physical Exam  Constitutional:       Appearance: He is well-developed.    HENT:      Head: Chronic nonintractable headache, unspecified headache type  - continue present treatment  - not currently following with neurology  - did not tolerate Topamax  - off amitriptyline  - has asked for stadol - discussed not recommended  - on fiorcet     Chronic pain disorder  - continue present treatment  - no longer following with pain management - do not accept insurance  - has had injections - poss RFA  - has asked for stadol for migraine    Dysthymia  - continue present treatment  - referral to counseling - declined  - did not tolerate zoloft  - off cymbalta  - back on prozac 40mg  - Stable     Chronic bilateral low back pain without sciatica  - following with pain management  - RFA and injections    Mixed hyperlipidemia  - continue present treatment  - watch diet  - on atrovastatin   - follow labs     Stage 2 chronic kidney disease  - stopped meloxicam  - avoid NSAID  - recheck labs    Other osteoporosis without current pathological fracture  - DEXA (11/2018)  - started fosamax - had to stop due to stomach pain, discussed trying another type, switch to prolia or trying reclast infusion.   - would like to explore reclast       Return in about 6 months (around 7/7/2020) for check up and review and labs. No orders of the defined types were placed in this encounter.     Requested Prescriptions     Signed Prescriptions Disp Refills    omeprazole (PRILOSEC) 40 MG delayed release capsule 30 capsule 5     Sig: Take 1 capsule by mouth every morning (before breakfast)    FLUoxetine (PROZAC) 40 MG capsule 30 capsule 5     Sig: Take 1 capsule by mouth daily    atorvastatin (LIPITOR) 10 MG tablet 30 tablet 5     Sig: Take 1 tablet by mouth daily    amLODIPine (NORVASC) 10 MG tablet 30 tablet 5     Sig: Take 1 tablet by mouth daily         Kristofer Blake MD  1/7/2020  4:50 PM

## 2020-01-14 ENCOUNTER — HOSPITAL ENCOUNTER (OUTPATIENT)
Age: 70
Setting detail: OUTPATIENT SURGERY
Discharge: HOME | End: 2020-01-14
Attending: SURGERY | Admitting: SURGERY
Payer: MEDICARE

## 2020-01-14 ENCOUNTER — ANESTHESIA EVENT (OUTPATIENT)
Dept: ENDOSCOPY | Age: 70
End: 2020-01-14
Payer: MEDICARE

## 2020-01-14 ENCOUNTER — ANESTHESIA (OUTPATIENT)
Dept: ENDOSCOPY | Age: 70
End: 2020-01-14
Payer: MEDICARE

## 2020-01-14 VITALS
RESPIRATION RATE: 18 BRPM | HEIGHT: 69 IN | DIASTOLIC BLOOD PRESSURE: 76 MMHG | WEIGHT: 206 LBS | HEART RATE: 63 BPM | OXYGEN SATURATION: 97 % | SYSTOLIC BLOOD PRESSURE: 124 MMHG | TEMPERATURE: 98.3 F | BODY MASS INDEX: 30.51 KG/M2

## 2020-01-14 VITALS — SYSTOLIC BLOOD PRESSURE: 84 MMHG | DIASTOLIC BLOOD PRESSURE: 54 MMHG | OXYGEN SATURATION: 90 %

## 2020-01-14 PROCEDURE — 43239 EGD BIOPSY SINGLE/MULTIPLE: CPT | Performed by: SURGERY

## 2020-01-14 PROCEDURE — 3609010300 HC COLONOSCOPY W/BIOPSY SINGLE/MULTIPLE: Performed by: SURGERY

## 2020-01-14 PROCEDURE — 7100000011 HC PHASE II RECOVERY - ADDTL 15 MIN: Performed by: SURGERY

## 2020-01-14 PROCEDURE — 2580000003 HC RX 258: Performed by: NURSE ANESTHETIST, CERTIFIED REGISTERED

## 2020-01-14 PROCEDURE — 3609012400 HC EGD TRANSORAL BIOPSY SINGLE/MULTIPLE: Performed by: SURGERY

## 2020-01-14 PROCEDURE — 3700000001 HC ADD 15 MINUTES (ANESTHESIA): Performed by: SURGERY

## 2020-01-14 PROCEDURE — 3700000000 HC ANESTHESIA ATTENDED CARE: Performed by: SURGERY

## 2020-01-14 PROCEDURE — 2709999900 HC NON-CHARGEABLE SUPPLY: Performed by: SURGERY

## 2020-01-14 PROCEDURE — 7100000010 HC PHASE II RECOVERY - FIRST 15 MIN: Performed by: SURGERY

## 2020-01-14 PROCEDURE — 45380 COLONOSCOPY AND BIOPSY: CPT | Performed by: SURGERY

## 2020-01-14 PROCEDURE — 88305 TISSUE EXAM BY PATHOLOGIST: CPT

## 2020-01-14 PROCEDURE — 88342 IMHCHEM/IMCYTCHM 1ST ANTB: CPT

## 2020-01-14 PROCEDURE — 6360000002 HC RX W HCPCS: Performed by: NURSE ANESTHETIST, CERTIFIED REGISTERED

## 2020-01-14 RX ORDER — PROPOFOL 10 MG/ML
INJECTION, EMULSION INTRAVENOUS PRN
Status: DISCONTINUED | OUTPATIENT
Start: 2020-01-14 | End: 2020-01-14 | Stop reason: SDUPTHER

## 2020-01-14 RX ORDER — FENTANYL CITRATE 50 UG/ML
INJECTION, SOLUTION INTRAMUSCULAR; INTRAVENOUS PRN
Status: DISCONTINUED | OUTPATIENT
Start: 2020-01-14 | End: 2020-01-14 | Stop reason: SDUPTHER

## 2020-01-14 RX ORDER — SODIUM CHLORIDE 9 MG/ML
INJECTION, SOLUTION INTRAVENOUS CONTINUOUS PRN
Status: DISCONTINUED | OUTPATIENT
Start: 2020-01-14 | End: 2020-01-14 | Stop reason: SDUPTHER

## 2020-01-14 RX ADMIN — SODIUM CHLORIDE: 9 INJECTION, SOLUTION INTRAVENOUS at 09:17

## 2020-01-14 RX ADMIN — PROPOFOL 200 MG: 10 INJECTION, EMULSION INTRAVENOUS at 09:43

## 2020-01-14 RX ADMIN — PROPOFOL 200 MG: 10 INJECTION, EMULSION INTRAVENOUS at 09:33

## 2020-01-14 RX ADMIN — PROPOFOL 100 MG: 10 INJECTION, EMULSION INTRAVENOUS at 10:00

## 2020-01-14 RX ADMIN — PROPOFOL 100 MG: 10 INJECTION, EMULSION INTRAVENOUS at 09:53

## 2020-01-14 RX ADMIN — PROPOFOL 200 MG: 10 INJECTION, EMULSION INTRAVENOUS at 09:23

## 2020-01-14 RX ADMIN — FENTANYL CITRATE 100 MCG: 50 INJECTION, SOLUTION INTRAMUSCULAR; INTRAVENOUS at 09:22

## 2020-01-14 ASSESSMENT — PAIN SCALES - GENERAL: PAINLEVEL_OUTOF10: 0

## 2020-01-14 ASSESSMENT — PAIN - FUNCTIONAL ASSESSMENT: PAIN_FUNCTIONAL_ASSESSMENT: 0-10

## 2020-01-14 NOTE — OP NOTE
EGD/Colonoscopy Op Note    DATE OF PROCEDURE: 1/14/2020     SURGEON: Lenny Mar MD    PREOPERATIVE DIAGNOSIS: Abdominal pain, positive fit    POSTOPERATIVE DIAGNOSIS: Same, gastroduodenitis, gastric ulcer, small hiatal hernia, fair prep, colon polyps, difficult colonoscopy    OPERATION: Procedure(s):  EGD BIOPSY  COLONOSCOPY WITH BIOPSY    ANESTHESIA: Local monitored anesthesia. ESTIMATED BLOOD LOSS: minimal    COMPLICATIONS: None. SPECIMENS:    ID Type Source Tests Collected by Time Destination   A : Antrum Ulcer Bxs Tissue Stomach SURGICAL PATHOLOGY Lenny Mar MD 1/14/2020 5303    B : Duod Bxs R/O Sprue Tissue Duodenum SURGICAL PATHOLOGY Lenny Mar MD 1/14/2020 9036    C : Bxs Proximal Esophagus inlet patch Tissue Tissue SURGICAL PATHOLOGY Lenny Mar MD 1/14/2020 0930    D : Bxs Polyp Hepatic Flexure Tissue Colon SURGICAL PATHOLOGY Lenny Mar MD 1/14/2020 1005    E : Bxs Polyp x2 Sigmoid Colon Tissue Colon SURGICAL PATHOLOGY Lenny Mar MD 1/14/2020 1014        HISTORY: The patient is a 77y.o. year old male with history of above preop diagnosis. I recommended esophagogastroduodenoscopy and colonoscopy with possible biopsy/polypectomy and I explained the risk, benefits, expected outcome, and alternatives to the procedure. Risks included but are not limited to bleeding, infection, respiratory distress, hypotension, and perforation. Patient understands and is in agreement. PROCEDURE: The patient was given IV conscious sedation per anesthesia. The patient was given supplemental oxygen by nasal cannula. The gastroscope was inserted orally and advanced under direct vision through the esophagus, through the stomach, through the pylorus, and into the duodenum. Findings:  Duodenum:     Descending: Difficult to advance around the duodenal sweep due to apparent benign-appearing stricturing.     Bulb: Mild duodenitis    Stomach:    Antrum: Gastritis and gastric ulcer status post

## 2020-01-14 NOTE — H&P
[address] [facility] Surgery Clinic Note     Assessment/Plan:        Diagnosis Orders   1. Duodenitis        Improved with PPI. Continue. Will plan for EGD   2. Positive FIT (fecal immunochemical test)        Colonoscopy   3. Non-recurrent bilateral inguinal hernia and umbilical hernia        We discussed repair and he will let us know when he has decided.            Return for Endoscopy.             Chief Complaint   Patient presents with    Abdominal Pain       Referred by Dr Jasvir Rayo for abdominal pain. states it has been better. had a positive FIT test. CT scan done of abdomen and pelvis.          PCP: Jose Alberto Antunez MD     HPI: Justino Du is a 77 y.o. male who presents in consultation for abdominal pain. He says it is diffuse but when asked to pinpoint it is no worse in the right upper mid abdomen. He says using a heating pad helps. It appears to be worse with physical activity occasionally. He has started out PPI and says he was about 80% better. He denies any nausea or emesis. There is no change in his symptoms with food. He has no diarrhea or constipation. He is noticed no melena or hematochezia. He had a colonoscopy in 2014 that was apparently normal but recommended 5 years. This was done for positive fit test.  He was told he had hemorrhoids. He is never had EGD previously. He recently had another positive fit test.  He underwent CT scan which showed small bilateral inguinal and umbilical hernia. Also some possible duodenitis. Also complains of some low back pain.   Last month his LFTs were normal.        Past Medical History        Past Medical History:   Diagnosis Date    Asthma      Depression      Hyperlipidemia      Hypertension      Migraine      Osteoporosis      Right retinal detachment 04/2019            Past Surgical History         Past Surgical History:   Procedure Laterality Date    FACIAL RECONSTRUCTION SURGERY        NECK SURGERY   1994    RETINAL DETACHMENT SURGERY systems reviewed and are negative.                  Objective:  Vitals   Vitals:     10/24/19 1345   BP: 138/87   Site: Left Upper Arm   Position: Sitting   Cuff Size: Medium Adult   Pulse: 79   Resp: 18   Temp: 98 °F (36.7 °C)   TempSrc: Oral   Weight: 199 lb (90.3 kg)   Height: 5' 9\" (1.753 m)            Physical Exam   Constitutional: He is oriented to person, place, and time. No distress. HENT:   Head: Normocephalic and atraumatic. Eyes: Right eye exhibits no discharge. Left eye exhibits no discharge. Neck: No tracheal deviation present. Cardiovascular: Normal rate. Pulmonary/Chest: Effort normal. No respiratory distress. Abdominal: Soft. He exhibits no distension. There is no tenderness. There is no rebound and no guarding. Small umbilical hernia   Neurological: He is alert and oriented to person, place, and time. Skin: Skin is warm and dry. He is not diaphoretic.                     Татьяна Islas MD       NOTE: This report, in part or full,may have been transcribed using voice recognition software. Every effort was made to ensure accuracy; however, inadvertent computerized transcription errors may be present.  Please excuse any transcriptional grammatical or spelling errors that may have escaped my editorial review.     CC: Jovita Whitten MD

## 2020-01-14 NOTE — ANESTHESIA PRE PROCEDURE
Department of Anesthesiology  Preprocedure Note       Name:  Josephine Astudillo   Age:  77 y.o.  :  1950                                          MRN:  [id number]         Date:  2020      Surgeon: Agustín Garcia):  Ernesto Jones MD    Procedure: EGD ESOPHAGOGASTRODUODENOSCOPY (N/A )  COLORECTAL CANCER SCREENING, HIGH RISK (N/A )    Medications prior to admission:   Prior to Admission medications    Medication Sig Start Date End Date Taking? Authorizing Provider   omeprazole (PRILOSEC) 40 MG delayed release capsule Take 1 capsule by mouth every morning (before breakfast) 20  Yes Nara Lopez MD   FLUoxetine [facility]) 40 MG capsule Take 1 capsule by mouth daily 20  Yes Nara Lopez MD   atorvastatin (LIPITOR) 10 MG tablet Take 1 tablet by mouth daily 20  Yes Nara Lopez MD   amLODIPine (NORVASC) 10 MG tablet Take 1 tablet by mouth daily 20  Yes Nara Lopez MD   MULTIPLE VITAMIN PO daily Ld 2020   Yes Historical Provider, MD   butalbital-acetaminophen-caffeine (FIORICET, ESGIC) -40 MG per tablet Take 1 tablet by mouth every 4 hours as needed for Headaches   Yes Historical Provider, MD   prednisoLONE acetate (PRED FORTE) 1 % ophthalmic suspension Place 1 drop into the right eye daily  19  Yes Historical Provider, MD   aspirin 81 MG tablet Take 81 mg by mouth daily No hold per dr. Mars Collins   Yes Historical Provider, MD   albuterol sulfate HFA (PROVENTIL HFA) 108 (90 Base) MCG/ACT inhaler Inhale into the lungs every 4 hours as needed Instructed to take am of procedure if needed and bring dos   Yes Historical Provider, MD   oxyCODONE-acetaminophen (PERCOCET) 5-325 MG per tablet Take 1 tablet by mouth 2 times daily.  Instructed to take am of procedure   Yes Historical Provider, MD   lisinopril (PRINIVIL;ZESTRIL) 20 MG tablet Take 20 mg by mouth 2 times daily    Yes Historical Provider, MD   atenolol (TENORMIN) 100 MG tablet Take 100 mg by mouth daily Instructed to take am of procedure   Yes Historical Provider, MD   niacin 500 MG extended release capsule Take 500 mg by mouth nightly Ld 1/8/2020    Historical Provider, MD       Current medications:    No current facility-administered medications for this encounter.         Allergies:    No Known Allergies    Problem List:    Patient Active Problem List   Diagnosis Code    Benign essential hypertension I10    Chronic pain disorder G89.4    Dysthymia F34.1    Headache R51    Nontraumatic incomplete tear of right rotator cuff M75.111    Cervical disc disease M50.90    Chronic bilateral low back pain without sciatica M54.5, G89.29    Mixed hyperlipidemia E78.2    Stage 2 chronic kidney disease N18.2    Other osteoporosis without current pathological fracture M81.8    Idiopathic osteoporosis M81.8    Generalized abdominal pain R10.84       Past Medical History:        Diagnosis Date    Asthma     Depression     Hyperlipidemia     Hypertension     Migraine     Osteoporosis     Right retinal detachment 04/2019       Past Surgical History:        Procedure Laterality Date    CATARACT REMOVAL WITH IMPLANT Right     FACIAL RECONSTRUCTION SURGERY      NECK SURGERY  1994    RETINAL DETACHMENT SURGERY Right 4/25/2019    RIGHT EYE RETINAL DETACHMENT REPAIR, PARS PLANA VITRECTOMY 25 GAUGE, ENDO LASER GAS FLUID EXCHANGE (SF6)++LATEX ALLERGY++ performed by Fabi Pollock MD at [facility] EXTRACTION         Social History:    Social History     Tobacco Use    Smoking status: Never Smoker    Smokeless tobacco: Never Used   Substance Use Topics    Alcohol use: Yes     Comment: occ                                 Counseling given: Not Answered      Vital Signs (Current):   Vitals:    01/07/20 1346 01/14/20 0844 01/14/20 0853   BP:  (!) 185/88 (!) 171/83   Pulse:  65    Resp:  16    Temp:  97 °F (36.1 °C)    TempSrc:  Temporal    SpO2:  98%    Weight: 206 lb Cardiovascular:    (+) hypertension:, hyperlipidemia        Rhythm: regular             Beta Blocker:  Dose within 24 Hrs         Neuro/Psych:   (+) headaches: migraine headaches, psychiatric history:depression/anxiety             GI/Hepatic/Renal:   (+) bowel prep,          ROS comment: Abdominal pain  Screening colonoscopy. Endo/Other:    (+) : arthritis:., .                  ROS comment: Retinal detachment Abdominal:           Vascular: negative vascular ROS. Anesthesia Plan      MAC     ASA 3     (Had a reaction with the fentanyl patch after 3 weeks of use)  Induction: intravenous. Anesthetic plan and risks discussed with patient. Plan discussed with CRNA.             Marlene Womack,    1/14/2020

## 2020-01-31 ENCOUNTER — OFFICE VISIT (OUTPATIENT)
Dept: SURGERY | Age: 70
End: 2020-01-31
Payer: MEDICARE

## 2020-01-31 VITALS
DIASTOLIC BLOOD PRESSURE: 88 MMHG | HEIGHT: 69 IN | SYSTOLIC BLOOD PRESSURE: 152 MMHG | TEMPERATURE: 97.5 F | OXYGEN SATURATION: 97 % | BODY MASS INDEX: 30.39 KG/M2 | RESPIRATION RATE: 16 BRPM | HEART RATE: 79 BPM | WEIGHT: 205.2 LBS

## 2020-01-31 PROCEDURE — 99213 OFFICE O/P EST LOW 20 MIN: CPT | Performed by: SURGERY

## 2020-01-31 PROCEDURE — 1123F ACP DISCUSS/DSCN MKR DOCD: CPT | Performed by: SURGERY

## 2020-01-31 PROCEDURE — 3017F COLORECTAL CA SCREEN DOC REV: CPT | Performed by: SURGERY

## 2020-01-31 PROCEDURE — G8427 DOCREV CUR MEDS BY ELIG CLIN: HCPCS | Performed by: SURGERY

## 2020-01-31 PROCEDURE — 1036F TOBACCO NON-USER: CPT | Performed by: SURGERY

## 2020-01-31 PROCEDURE — 4040F PNEUMOC VAC/ADMIN/RCVD: CPT | Performed by: SURGERY

## 2020-01-31 PROCEDURE — G8417 CALC BMI ABV UP PARAM F/U: HCPCS | Performed by: SURGERY

## 2020-01-31 PROCEDURE — G8482 FLU IMMUNIZE ORDER/ADMIN: HCPCS | Performed by: SURGERY

## 2020-01-31 NOTE — PROGRESS NOTES
[address] [facility] Surgery Clinic Note    Assessment/Plan:     Diagnosis Orders   1. Gastritis without bleeding, unspecified chronicity, unspecified gastritis type      Continue PPI   2. Duodenal stricture      Probably from peptic ulcer. Continue PPI. Upper GI to evaluate   3. Colon adenomas      Repeat colonoscopy in 2 years. Return for Results. Chief Complaint   Patient presents with    Results     2 week follow up Colonoscopy/EGD done on 12/30/19.  2 Week Follow-Up       PCP: Aruna Richards MD    HPI: Jessica Fair is a 77 y.o. male here for follow-up of EGD and colonoscopy. EGD did show possible duodenal sweep stricturing which appeared benign. Biopsies showed peptic duodenitis. He also had H. pylori negative gastritis. He had a small sliding hiatal hernia. His abdominal discomfort is improved with his PPI. He has no nausea or vomiting. He has no problems tolerating foods. He has no bloating. His bowels are moving normally. He did have a tubular adenomas removed. Also noted to have some mild diverticulosis. Review of Systems   All other systems reviewed and are negative. The remainder of the past medical, past surgical, family, and psychosocial history, as well as medication and allergy review, were completed and are as documented elsewhere in the chart. Objective:  Vitals:    01/31/20 0915   BP: (!) 152/88   Site: Left Upper Arm   Position: Sitting   Cuff Size: Large Adult   Pulse: 79   Resp: 16   Temp: 97.5 °F (36.4 °C)   TempSrc: Oral   SpO2: 97%   Weight: 205 lb 3.2 oz (93.1 kg)   Height: 5' 9\" (1.753 m)          Physical Exam  Constitutional:       General: He is not in acute distress. Appearance: He is not diaphoretic. Cardiovascular:      Rate and Rhythm: Normal rate. Pulmonary:      Effort: Pulmonary effort is normal. No respiratory distress. Abdominal:      General: There is no distension. Palpations: Abdomen is soft. Tenderness:  There is

## 2020-02-04 ENCOUNTER — TELEPHONE (OUTPATIENT)
Dept: SURGERY | Age: 70
End: 2020-02-04

## 2020-02-18 ENCOUNTER — HOSPITAL ENCOUNTER (OUTPATIENT)
Dept: GENERAL RADIOLOGY | Age: 70
Discharge: HOME | End: 2020-02-20
Payer: MEDICARE

## 2020-02-18 PROCEDURE — 74240 X-RAY XM UPR GI TRC 1CNTRST: CPT

## 2020-02-18 PROCEDURE — 2500000003 HC RX 250 WO HCPCS: Performed by: SURGERY

## 2020-02-18 RX ADMIN — BARIUM SULFATE 360 G: 980 POWDER, FOR SUSPENSION ORAL at 11:48

## 2020-02-18 RX ADMIN — BARIUM SULFATE 176 G: 960 POWDER, FOR SUSPENSION ORAL at 11:48

## 2020-02-25 ENCOUNTER — OFFICE VISIT (OUTPATIENT)
Dept: SURGERY | Age: 70
End: 2020-02-25
Payer: MEDICARE

## 2020-02-25 VITALS
RESPIRATION RATE: 16 BRPM | TEMPERATURE: 97.5 F | SYSTOLIC BLOOD PRESSURE: 159 MMHG | BODY MASS INDEX: 30.84 KG/M2 | HEART RATE: 68 BPM | OXYGEN SATURATION: 68 % | DIASTOLIC BLOOD PRESSURE: 84 MMHG | HEIGHT: 69 IN | WEIGHT: 208.2 LBS

## 2020-02-25 PROCEDURE — G8417 CALC BMI ABV UP PARAM F/U: HCPCS | Performed by: SURGERY

## 2020-02-25 PROCEDURE — G8482 FLU IMMUNIZE ORDER/ADMIN: HCPCS | Performed by: SURGERY

## 2020-02-25 PROCEDURE — G8427 DOCREV CUR MEDS BY ELIG CLIN: HCPCS | Performed by: SURGERY

## 2020-02-25 PROCEDURE — 1036F TOBACCO NON-USER: CPT | Performed by: SURGERY

## 2020-02-25 PROCEDURE — 1123F ACP DISCUSS/DSCN MKR DOCD: CPT | Performed by: SURGERY

## 2020-02-25 PROCEDURE — 3017F COLORECTAL CA SCREEN DOC REV: CPT | Performed by: SURGERY

## 2020-02-25 PROCEDURE — 99213 OFFICE O/P EST LOW 20 MIN: CPT | Performed by: SURGERY

## 2020-02-25 PROCEDURE — 4040F PNEUMOC VAC/ADMIN/RCVD: CPT | Performed by: SURGERY

## 2020-02-25 RX ORDER — KETOROLAC TROMETHAMINE 5 MG/ML
SOLUTION OPHTHALMIC
COMMUNITY
Start: 2020-02-12 | End: 2020-06-24

## 2020-03-04 NOTE — PROGRESS NOTES
grammatical or spelling errors that may have escaped my editorial review.       CC: Jacklyn Lozano MD

## 2020-03-26 ENCOUNTER — TELEPHONE (OUTPATIENT)
Dept: SURGERY | Age: 70
End: 2020-03-26

## 2020-03-26 NOTE — TELEPHONE ENCOUNTER
Madi Velez is scheduled for EGD with dilation with Dr Courtney Orta on 06-18-20 at [facility] at 10:00 am. Patient was told to arrive at 9:00 am. Patient needs to be NPO after midnight the night before procedure. All surgery instructions were explained to the patient and a surgery letter was also mailed out. MA informed patient that PAT will also be calling to review pre-op instructions and medications. Patient verbalized understanding.   Electronically signed by Aubree Piedra MA on 3/26/2020 at 3:44 PM

## 2020-04-07 LAB
6-ACETYLMORPHINE, UR: NORMAL
ALBUMIN SERPL-MCNC: NORMAL G/DL
ALP BLD-CCNC: NORMAL U/L
ALT SERPL-CCNC: NORMAL U/L
AMPHETAMINE SCREEN, URINE: NORMAL
ANION GAP SERPL CALCULATED.3IONS-SCNC: NORMAL MMOL/L
AST SERPL-CCNC: NORMAL U/L
BARBITURATE SCREEN, URINE: NORMAL
BASOPHILS ABSOLUTE: NORMAL
BASOPHILS RELATIVE PERCENT: NORMAL
BENZODIAZEPINE SCREEN, URINE: NORMAL
BILIRUB SERPL-MCNC: NORMAL MG/DL
BILIRUBIN, URINE: NORMAL
BLOOD, URINE: NORMAL
BUN BLDV-MCNC: NORMAL MG/DL
CALCIUM SERPL-MCNC: NORMAL MG/DL
CANNABINOID SCREEN URINE: NORMAL
CHLORIDE BLD-SCNC: NORMAL MMOL/L
CHOLESTEROL, FASTING: 156
CLARITY: NORMAL
CO2: NORMAL
COCAINE METABOLITE, URINE: NORMAL
COLOR: NORMAL
CREAT SERPL-MCNC: NORMAL MG/DL
CREATININE URINE: NORMAL
EDDP, URINE: NORMAL
EOSINOPHILS ABSOLUTE: NORMAL
EOSINOPHILS RELATIVE PERCENT: NORMAL
ETHANOL URINE: NORMAL
GFR CALCULATED: NORMAL
GLUCOSE BLD-MCNC: 91 MG/DL
GLUCOSE URINE: NORMAL
HCT VFR BLD CALC: NORMAL %
HDLC SERPL-MCNC: 34 MG/DL (ref 35–70)
HEMOGLOBIN: NORMAL
KETONES, URINE: NORMAL
LDL CHOLESTEROL CALCULATED: 98 MG/DL (ref 0–160)
LEUKOCYTE ESTERASE, URINE: NORMAL
LYMPHOCYTES ABSOLUTE: NORMAL
LYMPHOCYTES RELATIVE PERCENT: NORMAL
MCH RBC QN AUTO: NORMAL PG
MCHC RBC AUTO-ENTMCNC: NORMAL G/DL
MCV RBC AUTO: NORMAL FL
MDMA URINE: NORMAL
METHADONE SCREEN, URINE: NORMAL
METHAMPHETAMINE, URINE: NORMAL
MONOCYTES ABSOLUTE: NORMAL
MONOCYTES RELATIVE PERCENT: NORMAL
NEUTROPHILS ABSOLUTE: NORMAL
NEUTROPHILS RELATIVE PERCENT: NORMAL
NITRITE, URINE: NORMAL
OPIATES, URINE: NORMAL
OXYCODONE: NORMAL
PCP: NORMAL
PH UA: NORMAL
PH, URINE: NORMAL
PHENCYCLIDINE, URINE: NORMAL
PLATELET # BLD: NORMAL 10*3/UL
PMV BLD AUTO: NORMAL FL
POTASSIUM SERPL-SCNC: 5 MMOL/L
PROPOXYPHENE, URINE: NORMAL
PROSTATE SPECIFIC ANTIGEN: 1.2 NG/ML
PROTEIN UA: NORMAL
RBC # BLD: NORMAL 10*6/UL
SODIUM BLD-SCNC: 142 MMOL/L
SPECIFIC GRAVITY, URINE: NORMAL
TOTAL PROTEIN: NORMAL
TRICYCLIC ANTIDEPRESSANTS, UR: NORMAL
TRIGLYCERIDE, FASTING: 134
UROBILINOGEN, URINE: NORMAL
WBC # BLD: NORMAL 10*3/UL

## 2020-06-12 ENCOUNTER — HOSPITAL ENCOUNTER (OUTPATIENT)
Age: 70
Discharge: HOME | End: 2020-06-14
Payer: MEDICARE

## 2020-06-12 PROCEDURE — U0003 INFECTIOUS AGENT DETECTION BY NUCLEIC ACID (DNA OR RNA); SEVERE ACUTE RESPIRATORY SYNDROME CORONAVIRUS 2 (SARS-COV-2) (CORONAVIRUS DISEASE [COVID-19]), AMPLIFIED PROBE TECHNIQUE, MAKING USE OF HIGH THROUGHPUT TECHNOLOGIES AS DESCRIBED BY CMS-2020-01-R: HCPCS

## 2020-06-14 LAB
SARS-COV-2: NOT DETECTED
SOURCE: NORMAL

## 2020-06-15 NOTE — PROGRESS NOTES
[facility] PRE-ADMISSION TESTING INSTRUCTIONS    The Preadmission Testing patient is instructed accordingly using the following criteria (check applicable):    ARRIVAL INSTRUCTIONS:  [x] Parking the day of Surgery is located in the Main Entrance lot. Upon entering the door, make an immediate right to the surgery reception desk    [] [id number] is available Monday through Friday 6 am to 6 pm    [x] Bring photo ID and insurance card    [] Bring in a copy of Living will or Durable Power of  papers. [x] Please be sure to arrange for responsible adult to provide transportation to and from the hospital    [x] Please arrange for responsible adult to be with you for the 24 hour period post procedure due to having anesthesia      GENERAL INSTRUCTIONS:    [x] Nothing by mouth after midnight, including gum, candy, mints or water    [x] You may brush your teeth, but do not swallow any water    [x] Take medications as instructed with 1-2 oz of water    [x] Stop herbal supplements and vitamins 5 days prior to procedure    [x] Follow preop dosing of blood thinners per physician instructions    [] Take 1/2 dose of evening insulin, but no insulin after midnight    [] No oral diabetic medications after midnight    [] If diabetic and have low blood sugar or feel symptomatic, take 1-2oz apple juice only    [] Bring inhalers day of surgery    [] Bring C-PAP/ Bi-Pap day of surgery    [] Bring urine specimen day of surgery    [x] Shower or bath with soap, lather and rinse well, AM of Surgery, no lotion, powders or creams to surgical site    [] Follow bowel prep as instructed per surgeon    [x] No tobacco products within 24 hours of surgery     [x] No alcohol or illegal drug use within 24 hours of surgery.     [x] Jewelry, body piercing's, eyeglasses, contact lenses and dentures are not permitted into surgery (bring cases)      [] Please do not wear any nail polish, make up or hair

## 2020-06-18 ENCOUNTER — ANESTHESIA EVENT (OUTPATIENT)
Dept: ENDOSCOPY | Age: 70
End: 2020-06-18
Payer: MEDICARE

## 2020-06-18 ENCOUNTER — ANESTHESIA (OUTPATIENT)
Dept: ENDOSCOPY | Age: 70
End: 2020-06-18
Payer: MEDICARE

## 2020-06-18 ENCOUNTER — HOSPITAL ENCOUNTER (OUTPATIENT)
Age: 70
Setting detail: OUTPATIENT SURGERY
Discharge: HOME | End: 2020-06-18
Attending: SURGERY | Admitting: SURGERY
Payer: MEDICARE

## 2020-06-18 VITALS
RESPIRATION RATE: 16 BRPM | SYSTOLIC BLOOD PRESSURE: 135 MMHG | OXYGEN SATURATION: 97 % | DIASTOLIC BLOOD PRESSURE: 68 MMHG | TEMPERATURE: 97.5 F | HEART RATE: 65 BPM | HEIGHT: 69 IN | BODY MASS INDEX: 29.62 KG/M2 | WEIGHT: 200 LBS

## 2020-06-18 VITALS
DIASTOLIC BLOOD PRESSURE: 87 MMHG | OXYGEN SATURATION: 96 % | SYSTOLIC BLOOD PRESSURE: 152 MMHG | RESPIRATION RATE: 10 BRPM

## 2020-06-18 PROCEDURE — 3609017700 HC EGD DILATION GASTRIC/DUODENAL STRICTURE: Performed by: SURGERY

## 2020-06-18 PROCEDURE — 43239 EGD BIOPSY SINGLE/MULTIPLE: CPT | Performed by: SURGERY

## 2020-06-18 PROCEDURE — 2580000003 HC RX 258: Performed by: NURSE ANESTHETIST, CERTIFIED REGISTERED

## 2020-06-18 PROCEDURE — 88342 IMHCHEM/IMCYTCHM 1ST ANTB: CPT

## 2020-06-18 PROCEDURE — 3700000001 HC ADD 15 MINUTES (ANESTHESIA): Performed by: SURGERY

## 2020-06-18 PROCEDURE — 7100000011 HC PHASE II RECOVERY - ADDTL 15 MIN: Performed by: SURGERY

## 2020-06-18 PROCEDURE — 6360000002 HC RX W HCPCS: Performed by: NURSE ANESTHETIST, CERTIFIED REGISTERED

## 2020-06-18 PROCEDURE — 88305 TISSUE EXAM BY PATHOLOGIST: CPT

## 2020-06-18 PROCEDURE — 7100000010 HC PHASE II RECOVERY - FIRST 15 MIN: Performed by: SURGERY

## 2020-06-18 PROCEDURE — C1726 CATH, BAL DIL, NON-VASCULAR: HCPCS | Performed by: SURGERY

## 2020-06-18 PROCEDURE — 3700000000 HC ANESTHESIA ATTENDED CARE: Performed by: SURGERY

## 2020-06-18 PROCEDURE — 2709999900 HC NON-CHARGEABLE SUPPLY: Performed by: SURGERY

## 2020-06-18 PROCEDURE — 43249 ESOPH EGD DILATION <30 MM: CPT | Performed by: SURGERY

## 2020-06-18 RX ORDER — PROPOFOL 10 MG/ML
INJECTION, EMULSION INTRAVENOUS PRN
Status: DISCONTINUED | OUTPATIENT
Start: 2020-06-18 | End: 2020-06-18 | Stop reason: SDUPTHER

## 2020-06-18 RX ORDER — SODIUM CHLORIDE 9 MG/ML
INJECTION, SOLUTION INTRAVENOUS CONTINUOUS PRN
Status: DISCONTINUED | OUTPATIENT
Start: 2020-06-18 | End: 2020-06-18 | Stop reason: SDUPTHER

## 2020-06-18 RX ADMIN — PROPOFOL 200 MG: 10 INJECTION, EMULSION INTRAVENOUS at 09:56

## 2020-06-18 RX ADMIN — SODIUM CHLORIDE: 9 INJECTION, SOLUTION INTRAVENOUS at 09:37

## 2020-06-18 ASSESSMENT — PAIN SCALES - GENERAL
PAINLEVEL_OUTOF10: 0

## 2020-06-18 ASSESSMENT — PAIN - FUNCTIONAL ASSESSMENT: PAIN_FUNCTIONAL_ASSESSMENT: 0-10

## 2020-06-18 NOTE — ANESTHESIA POSTPROCEDURE EVALUATION
Department of Anesthesiology  Postprocedure Note    Patient: Josep Shane  MRN: [id number]  YOB: 1950  Date of evaluation: 6/18/2020  Time:  11:35 AM     Procedure Summary     Date:  06/18/20 Room / Location:  [place] [address] / [facility]    Anesthesia Start:  1347 Anesthesia Stop:  1013    Procedures:       EGD BIOPSY (N/A )      EGD DILATION BALLOON Diagnosis:  (DUODENAL STRICTURE)    Surgeon:  Vicky Foster MD Responsible Provider:  Ethan Aguero MD    Anesthesia Type:  MAC ASA Status:  3          Anesthesia Type: MAC    Gonzalez Phase I: Gonzalez Score: 10    Gonzalez Phase II: Gonzalez Score: 10    Last vitals: Reviewed and per EMR flowsheets.        Anesthesia Post Evaluation    Patient location during evaluation: PACU  Patient participation: complete - patient participated  Level of consciousness: awake and alert  Airway patency: patent  Nausea & Vomiting: no nausea and no vomiting  Complications: no  Cardiovascular status: hemodynamically stable  Respiratory status: acceptable  Hydration status: euvolemic

## 2020-06-18 NOTE — H&P
[address] [facility] Surgery Clinic Note     Assessment/Plan:       Diagnosis Orders   1. Duodenal stricture        Upper GI confirm stricture. Continue PPI. Will reevaluate in 2 months to reassess and rebiopsy. Not having any obstructive symptoms.         Return in about 2 months (around 4/25/2020) for EGD.             Chief Complaint   Patient presents with    Results       UGI Results.          PCP: Hosea Cardona MD     HPI: Maria Luisa Wilhelm is a 77 y.o. male here for follow-up with upper GI for stricture seen on EGD. Upper GI did show a stricture. It did show multiple duodenal diverticulum as well. And some mild GERD. He has no obstructive symptoms. He has no abdominal pain anymore. He is taking his PPI.     Review of Systems   All other systems reviewed and are negative.        The remainder of the past medical, past surgical, family, and psychosocial history, as well as medication and allergy review, were completed and are as documented elsewhere in the chart.            Objective:  Vitals       Vitals:     02/25/20 1408   BP: (!) 159/84   Site: Left Upper Arm   Position: Sitting   Cuff Size: Medium Adult   Pulse: 68   Resp: 16   Temp: 97.5 °F (36.4 °C)   TempSrc: Oral   SpO2: (!) 68%   Weight: 208 lb 3.2 oz (94.4 kg)   Height: 5' 9\" (1.753 m)            Physical Exam  Constitutional:       General: He is not in acute distress. Appearance: He is not diaphoretic. Cardiovascular:      Rate and Rhythm: Normal rate. Pulmonary:      Effort: Pulmonary effort is normal. No respiratory distress. Abdominal:      General: There is no distension. Palpations: Abdomen is soft. Tenderness: There is no abdominal tenderness. There is no guarding or rebound.                     Yanelis Stover MD        NOTE: This report, in part or full, may have been transcribed using voice recognition software.  Every effort was made to ensure accuracy; however, inadvertent computerized transcription errors may be present.  Please excuse any transcriptional grammatical or spelling errors that may have escaped my editorial review.        CC: Harleen Klein MD      Addendum: No changes

## 2020-06-23 ENCOUNTER — TELEPHONE (OUTPATIENT)
Dept: SURGERY | Age: 70
End: 2020-06-23

## 2020-06-23 ENCOUNTER — CLINICAL DOCUMENTATION (OUTPATIENT)
Dept: SURGERY | Age: 70
End: 2020-06-23

## 2020-06-23 NOTE — TELEPHONE ENCOUNTER
MA got a phone call in from the patient stating he had a scope done last week with a balloon. He stated he was in some pain still and it is just a sore feeling. He does not have any fevers or anything just pain. Per Dr. Fer Lara he said that is normal just as long as it is not severe pain and that he can try taking Maalox and to continue his medication he is already on. Patient verbalized understanding.     Electronically signed by Nehal Thayer on 6/23/20 at 9:44 AM EDT

## 2020-06-24 ENCOUNTER — OFFICE VISIT (OUTPATIENT)
Dept: FAMILY MEDICINE CLINIC | Age: 70
End: 2020-06-24
Payer: MEDICARE

## 2020-06-24 VITALS
OXYGEN SATURATION: 96 % | DIASTOLIC BLOOD PRESSURE: 80 MMHG | HEIGHT: 69 IN | HEART RATE: 69 BPM | TEMPERATURE: 98.7 F | BODY MASS INDEX: 32.18 KG/M2 | SYSTOLIC BLOOD PRESSURE: 138 MMHG | WEIGHT: 217.25 LBS

## 2020-06-24 PROBLEM — K29.70 GASTRITIS: Status: ACTIVE | Noted: 2020-06-24

## 2020-06-24 PROBLEM — D12.6 ADENOMATOUS POLYP OF COLON: Status: ACTIVE | Noted: 2020-06-24

## 2020-06-24 PROBLEM — K31.5 DUODENAL STRICTURE: Status: ACTIVE | Noted: 2020-06-24

## 2020-06-24 PROCEDURE — 3017F COLORECTAL CA SCREEN DOC REV: CPT | Performed by: INTERNAL MEDICINE

## 2020-06-24 PROCEDURE — G8427 DOCREV CUR MEDS BY ELIG CLIN: HCPCS | Performed by: INTERNAL MEDICINE

## 2020-06-24 PROCEDURE — G8417 CALC BMI ABV UP PARAM F/U: HCPCS | Performed by: INTERNAL MEDICINE

## 2020-06-24 PROCEDURE — 1123F ACP DISCUSS/DSCN MKR DOCD: CPT | Performed by: INTERNAL MEDICINE

## 2020-06-24 PROCEDURE — 1036F TOBACCO NON-USER: CPT | Performed by: INTERNAL MEDICINE

## 2020-06-24 PROCEDURE — 99214 OFFICE O/P EST MOD 30 MIN: CPT | Performed by: INTERNAL MEDICINE

## 2020-06-24 PROCEDURE — 4040F PNEUMOC VAC/ADMIN/RCVD: CPT | Performed by: INTERNAL MEDICINE

## 2020-06-24 RX ORDER — AMLODIPINE BESYLATE 10 MG/1
10 TABLET ORAL DAILY
Qty: 30 TABLET | Refills: 5 | Status: SHIPPED
Start: 2020-06-24 | End: 2021-01-26 | Stop reason: SDUPTHER

## 2020-06-24 RX ORDER — FLUOXETINE HYDROCHLORIDE 40 MG/1
40 CAPSULE ORAL DAILY
Qty: 30 CAPSULE | Refills: 5 | Status: SHIPPED
Start: 2020-06-24 | End: 2021-01-26 | Stop reason: SDUPTHER

## 2020-06-24 RX ORDER — ATORVASTATIN CALCIUM 10 MG/1
10 TABLET, FILM COATED ORAL DAILY
Qty: 30 TABLET | Refills: 5 | Status: SHIPPED
Start: 2020-06-24 | End: 2021-01-26 | Stop reason: SDUPTHER

## 2020-06-24 RX ORDER — OMEPRAZOLE 40 MG/1
40 CAPSULE, DELAYED RELEASE ORAL
Qty: 30 CAPSULE | Refills: 5 | Status: SHIPPED
Start: 2020-06-24 | End: 2020-12-28 | Stop reason: SDUPTHER

## 2020-06-24 ASSESSMENT — ENCOUNTER SYMPTOMS
DIARRHEA: 0
CONSTIPATION: 0
SHORTNESS OF BREATH: 0
VOMITING: 0
RHINORRHEA: 0
CHEST TIGHTNESS: 0
COUGH: 0
ABDOMINAL PAIN: 1
NAUSEA: 0
SORE THROAT: 0
EYE PAIN: 0
BLOOD IN STOOL: 0

## 2020-06-24 NOTE — PROGRESS NOTES
abdominal pain. Negative for blood in stool, constipation, diarrhea, nausea and vomiting. Genitourinary: Negative for difficulty urinating, dysuria, hematuria, scrotal swelling, testicular pain and urgency. Musculoskeletal: Negative for arthralgias and gait problem. Skin: Negative for rash. Neurological: Positive for headaches. Negative for dizziness, syncope, weakness and light-headedness. Hematological: Negative for adenopathy. Does not bruise/bleed easily. Psychiatric/Behavioral: Negative for suicidal ideas. The patient is not nervous/anxious. Current Outpatient Medications:     omeprazole (PRILOSEC) 40 MG delayed release capsule, Take 1 capsule by mouth every morning (before breakfast), Disp: 30 capsule, Rfl: 5    FLUoxetine (PROZAC) 40 MG capsule, Take 1 capsule by mouth daily, Disp: 30 capsule, Rfl: 5    atorvastatin (LIPITOR) 10 MG tablet, Take 1 tablet by mouth daily, Disp: 30 tablet, Rfl: 5    amLODIPine (NORVASC) 10 MG tablet, Take 1 tablet by mouth daily, Disp: 30 tablet, Rfl: 5    MULTIPLE VITAMIN PO, daily Ld 6/15/2020, Disp: , Rfl:     butalbital-acetaminophen-caffeine (FIORICET, ESGIC) -40 MG per tablet, Take 1 tablet by mouth every 4 hours as needed for Headaches, Disp: , Rfl:     aspirin 81 MG tablet, Take 81 mg by mouth daily No hold per dr. Janny Dodd: , Rfl:     albuterol sulfate HFA (PROVENTIL HFA) 108 (90 Base) MCG/ACT inhaler, Inhale into the lungs every 4 hours as needed Instructed to take am of procedure if needed and bring dos, Disp: , Rfl:     oxyCODONE-acetaminophen (PERCOCET) 5-325 MG per tablet, Take 1 tablet by mouth 2 times daily.  Instructed to take am of procedure, Disp: , Rfl:     lisinopril (PRINIVIL;ZESTRIL) 20 MG tablet, Take 20 mg by mouth 2 times daily , Disp: , Rfl:     atenolol (TENORMIN) 100 MG tablet, Take 100 mg by mouth daily Instructed to take am of procedure, Disp: , Rfl:     niacin 500 MG extended release capsule, Take 500 mg connections     Talks on phone: Not on file     Gets together: Not on file     Attends Quaker service: Not on file     Active member of club or organization: Not on file     Attends meetings of clubs or organizations: Not on file     Relationship status: Not on file    Intimate partner violence     Fear of current or ex partner: Not on file     Emotionally abused: Not on file     Physically abused: Not on file     Forced sexual activity: Not on file   Other Topics Concern    Not on file   Social History Narrative    Not on file       Vitals:    06/24/20 0933   BP: 138/80   Pulse: 69   Temp: 98.7 °F (37.1 °C)   SpO2: 96%   Weight: 217 lb 4 oz (98.5 kg)   Height: 5' 9\" (1.753 m)       Exam:  Physical Exam  Constitutional:       Appearance: He is well-developed. HENT:      Head: Normocephalic and atraumatic. Right Ear: External ear normal.      Left Ear: External ear normal.   Eyes:      Pupils: Pupils are equal, round, and reactive to light. Neck:      Musculoskeletal: Normal range of motion and neck supple. Thyroid: No thyromegaly. Cardiovascular:      Rate and Rhythm: Normal rate and regular rhythm. Heart sounds: Normal heart sounds. No murmur. Pulmonary:      Effort: Pulmonary effort is normal.      Breath sounds: Normal breath sounds. No wheezing or rales. Abdominal:      General: Bowel sounds are normal.      Palpations: Abdomen is soft. Tenderness: There is generalized abdominal tenderness. There is no guarding or rebound. Musculoskeletal:      Right shoulder: He exhibits decreased range of motion. Left shoulder: He exhibits decreased range of motion. Lumbar back: He exhibits decreased range of motion. Lymphadenopathy:      Cervical: No cervical adenopathy. Skin:     General: Skin is warm and dry. Neurological:      Mental Status: He is alert and oriented to person, place, and time. Cranial Nerves: No cranial nerve deficit.    Psychiatric: Judgment: Judgment normal.         Assessment and Plan:    Silvia Tatum was seen today for eye problem and hypertension.     Diagnoses and all orders for this visit:    Gastritis without bleeding, unspecified chronicity, unspecified gastritis type  - on omeprazole   - following with surgery  - last EGD (6/2020) which showed healing   - referred to endo surgery for duodenal stricture   - improved with PPI     Duodenal stricture  - on omeprazole   - following with surgery  - last EGD (6/2020) which showed healing   - referred to endo surgery for duodenal stricture     Benign essential hypertension  - continue present treatment  - monitor blood pressure at home  - watch diet  - on amlodipine   - on atenolol   - on lisinopril   - stable     Nontraumatic incomplete tear of right rotator cuff  - has been following with pain management - injections   - had abnormality of humerus on right, MRI 2018   - [facility]   - Stable     Cervical disc disease  - previous c5-6 surgery  - weakness of forearm  - Stable and unchanged     Chronic nonintractable headache, unspecified headache type  - continue present treatment  - not currently following with neurology  - did not tolerate Topamax  - off amitriptyline  - has asked for stadol - discussed not recommended  - on fiorcet     Chronic pain disorder  - continue present treatment  - no longer following with pain management - do not accept insurance  - has had injections - poss RFA  - has asked for stadol for migraine    Dysthymia  - continue present treatment  - referral to counseling - declined  - did not tolerate zoloft  - off cymbalta  - back on prozac 40mg  - Stable     Chronic bilateral low back pain without sciatica  - following with pain management  - RFA and injections    Mixed hyperlipidemia  - continue present treatment  - watch diet  - on atrovastatin   - follow labs     Stage 2 chronic kidney disease  - stopped meloxicam  - avoid NSAID  - recheck labs    Other osteoporosis without current pathological fracture  - DEXA (11/2018)  - started fosamax - had to stop due to stomach pain, discussed trying another type, switch to prolia or trying reclast infusion.   - would like to explore reclast      Adenomatous polyp of colon, unspecified part of colon  - last colonoscopy (1/2020)   - follow up in 2-3 years      Return in about 6 months (around 12/24/2020) for check up and review. No orders of the defined types were placed in this encounter.     Requested Prescriptions     Signed Prescriptions Disp Refills    omeprazole (PRILOSEC) 40 MG delayed release capsule 30 capsule 5     Sig: Take 1 capsule by mouth every morning (before breakfast)    FLUoxetine (PROZAC) 40 MG capsule 30 capsule 5     Sig: Take 1 capsule by mouth daily    atorvastatin (LIPITOR) 10 MG tablet 30 tablet 5     Sig: Take 1 tablet by mouth daily    amLODIPine (NORVASC) 10 MG tablet 30 tablet 5     Sig: Take 1 tablet by mouth daily         Maria L Skinner MD  6/24/2020  12:23 PM

## 2020-07-14 ENCOUNTER — OFFICE VISIT (OUTPATIENT)
Dept: SURGERY | Age: 70
End: 2020-07-14
Payer: MEDICARE

## 2020-07-14 VITALS
BODY MASS INDEX: 29.62 KG/M2 | HEART RATE: 70 BPM | HEIGHT: 69 IN | TEMPERATURE: 98.3 F | WEIGHT: 200 LBS | DIASTOLIC BLOOD PRESSURE: 80 MMHG | OXYGEN SATURATION: 95 % | SYSTOLIC BLOOD PRESSURE: 136 MMHG

## 2020-07-14 PROCEDURE — 99213 OFFICE O/P EST LOW 20 MIN: CPT | Performed by: SURGERY

## 2020-07-14 NOTE — PROGRESS NOTES
General Surgery History and Physical  [facility]    Patient's Name/Date of Birth: Ada Smith / 1950    Date: July 14, 2020     Surgeon: Alverto Bashir MD    PCP: James Medel MD     Chief Complaint: duodenal stricture    HPI:   Ada Smith is a 72 y.o. male who presents for evaluation of duodenal stricture. This was found on endoscopy 6 months ago done for epigastric pain. He was found to have peptic duodenitis at that time. He underwent dilation. EGD last month revealed similar findings. He was referred for further management. He denies early satiety or persistent epigastric pain. No weight loss. No significant family history of cancer.        Patient Active Problem List   Diagnosis    Benign essential hypertension    Chronic pain disorder    Dysthymia    Headache    Nontraumatic incomplete tear of right rotator cuff    Cervical disc disease    Chronic bilateral low back pain without sciatica    Mixed hyperlipidemia    Stage 2 chronic kidney disease    Other osteoporosis without current pathological fracture    Idiopathic osteoporosis    Generalized abdominal pain    Gastritis    Duodenal stricture    Adenomatous polyp of colon       Past Medical History:   Diagnosis Date    Asthma     Depression     GERD (gastroesophageal reflux disease)     Hyperlipidemia     Hypertension     Migraine     Osteoporosis     Right retinal detachment 04/2019       Past Surgical History:   Procedure Laterality Date    CATARACT REMOVAL WITH IMPLANT Right     COLONOSCOPY N/A 1/14/2020    COLONOSCOPY WITH BIOPSY performed by Margo Houston MD at [facility] Right 4/25/2019    RIGHT EYE RETINAL DETACHMENT REPAIR, PARS PLANA VITRECTOMY 25 GAUGE, ENDO LASER GAS FLUID EXCHANGE (SF6)++LATEX ALLERGY++ performed by Karoline Maya MD at [address] [place] [address] EXTRACTION      UPPER GASTROINTESTINAL ENDOSCOPY N/A 1/14/2020    EGD BIOPSY performed by Nona Duran MD at [facility] N/A 6/18/2020    EGD DILATION BALLOON performed by Nona Duran MD at [facility]         No Known Allergies    The patient has a family history that is negative for severe cardiovascular or respiratory issues, negative for reaction to anesthesia. Time spent reviewing past medical, surgical, social and family history, vitals, nursing assessment and images. No changes from above documented history.     Social History     Socioeconomic History    Marital status:      Spouse name: Not on file    Number of children: Not on file    Years of education: Not on file    Highest education level: Not on file   Occupational History    Not on file   Social Needs    Financial resource strain: Not on file    Food insecurity     Worry: Not on file     Inability: Not on file    Transportation needs     Medical: Not on file     Non-medical: Not on file   Tobacco Use    Smoking status: Never Smoker    Smokeless tobacco: Never Used   Substance and Sexual Activity    Alcohol use: Yes     Comment: occ     Drug use: No    Sexual activity: Not on file   Lifestyle    Physical activity     Days per week: Not on file     Minutes per session: Not on file    Stress: Not on file   Relationships    Social connections     Talks on phone: Not on file     Gets together: Not on file     Attends Evangelical service: Not on file     Active member of club or organization: Not on file     Attends meetings of clubs or organizations: Not on file     Relationship status: Not on file    Intimate partner violence     Fear of current or ex partner: Not on file     Emotionally abused: Not on file     Physically abused: Not on file     Forced sexual activity: Not on file   Other Topics Concern    Not on file   Social History Narrative    Not on file       I have reviewed relevant labs from this admission and interpretation is included in my assessment and plan    Review of Systems    A complete 10 system review was performed and are otherwise negative unless mentioned in the above HPI. Specific negatives are listed below but may not include all those reviewed. General ROS: negative obtundation, AMS  ENT ROS: negative rhinorrhea, epistaxis  Allergy and Immunology ROS: negative itchy/watery eyes or nasal congestion  Hematological and Lymphatic ROS: negative spontaneous bleeding or bruising  Endocrine ROS: negative  lethargy, mood swings, palpitations or polydipsia/polyuria  Respiratory ROS: negative sputum changes, stridor, tachypnea or wheezing  Cardiovascular ROS: negative for - loss of consciousness, murmur or orthopnea  Gastrointestinal ROS: negative for - hematochezia or hematemesis  Genito-Urinary ROS: negative for -  genital discharge or hematuria  Musculoskeletal ROS: negative for - focal weakness, gangrene  Psych/Neuro ROS: negative for - visual or auditory hallucinations, suicidal ideation    Physical exam:   /80 (Site: Left Upper Arm, Position: Sitting, Cuff Size: Medium Adult)   Pulse 70   Temp 98.3 °F (36.8 °C) (Temporal)   Ht 5' 9\" (1.753 m)   Wt 200 lb (90.7 kg)   SpO2 95%   BMI 29.53 kg/m²   General appearance:  NAD, appears stated age  Head: NCAT, PERRLA, EOMI, red conjunctiva  Neck: supple, no masses, trachea midline  Lungs: Equal chest rise bilateral, no retractions, no wheezing  Heart: Reg rate  Abdomen: soft, non distended, non tender.   Skin; warm and dry, no cyanosis  Gu: no cva tenderness  Extremities: atraumatic, no focal motor deficits, no open wounds  Psych: No tremor, visual hallucinations      Radiology: n/a    Assessment:  Donovan Mancilla is a 72 y.o. male with duodenal stricture  Patient Active Problem List   Diagnosis    Benign essential hypertension    Chronic pain disorder    Dysthymia    Headache   

## 2020-10-06 ENCOUNTER — OFFICE VISIT (OUTPATIENT)
Dept: FAMILY MEDICINE CLINIC | Age: 70
End: 2020-10-06
Payer: MEDICARE

## 2020-10-06 ENCOUNTER — OFFICE VISIT (OUTPATIENT)
Dept: SURGERY | Age: 70
End: 2020-10-06
Payer: MEDICARE

## 2020-10-06 ENCOUNTER — TELEPHONE (OUTPATIENT)
Dept: SURGERY | Age: 70
End: 2020-10-06

## 2020-10-06 VITALS
WEIGHT: 205 LBS | HEIGHT: 69 IN | SYSTOLIC BLOOD PRESSURE: 147 MMHG | HEART RATE: 74 BPM | DIASTOLIC BLOOD PRESSURE: 82 MMHG | TEMPERATURE: 97.3 F | BODY MASS INDEX: 30.36 KG/M2 | OXYGEN SATURATION: 97 %

## 2020-10-06 VITALS
DIASTOLIC BLOOD PRESSURE: 82 MMHG | WEIGHT: 218 LBS | TEMPERATURE: 97.9 F | OXYGEN SATURATION: 96 % | SYSTOLIC BLOOD PRESSURE: 136 MMHG | RESPIRATION RATE: 20 BRPM | HEART RATE: 66 BPM | BODY MASS INDEX: 32.29 KG/M2 | HEIGHT: 69 IN

## 2020-10-06 PROCEDURE — 3017F COLORECTAL CA SCREEN DOC REV: CPT | Performed by: PHYSICIAN ASSISTANT

## 2020-10-06 PROCEDURE — 4040F PNEUMOC VAC/ADMIN/RCVD: CPT | Performed by: PHYSICIAN ASSISTANT

## 2020-10-06 PROCEDURE — G8484 FLU IMMUNIZE NO ADMIN: HCPCS | Performed by: PHYSICIAN ASSISTANT

## 2020-10-06 PROCEDURE — G8417 CALC BMI ABV UP PARAM F/U: HCPCS | Performed by: PHYSICIAN ASSISTANT

## 2020-10-06 PROCEDURE — 99213 OFFICE O/P EST LOW 20 MIN: CPT | Performed by: PHYSICIAN ASSISTANT

## 2020-10-06 PROCEDURE — 1036F TOBACCO NON-USER: CPT | Performed by: PHYSICIAN ASSISTANT

## 2020-10-06 PROCEDURE — G8427 DOCREV CUR MEDS BY ELIG CLIN: HCPCS | Performed by: PHYSICIAN ASSISTANT

## 2020-10-06 PROCEDURE — 1123F ACP DISCUSS/DSCN MKR DOCD: CPT | Performed by: PHYSICIAN ASSISTANT

## 2020-10-06 PROCEDURE — 99213 OFFICE O/P EST LOW 20 MIN: CPT | Performed by: SURGERY

## 2020-10-06 RX ORDER — DOXYCYCLINE HYCLATE 100 MG
100 TABLET ORAL 2 TIMES DAILY
Qty: 20 TABLET | Refills: 0 | Status: SHIPPED | OUTPATIENT
Start: 2020-10-06 | End: 2020-10-16

## 2020-10-06 ASSESSMENT — ENCOUNTER SYMPTOMS
SHORTNESS OF BREATH: 0
ABDOMINAL PAIN: 0
PHOTOPHOBIA: 0
BACK PAIN: 0
NAUSEA: 0
SORE THROAT: 0
DIARRHEA: 0
VOMITING: 0
COUGH: 0

## 2020-10-06 NOTE — PROGRESS NOTES
10/6/20  [facility] : 1950 Sex: male  Age 72 y.o. Subjective:  Chief Complaint   Patient presents with    Finger Pain         71-year-old male with a history of hyperlipidemia, hypertension, asthma, GERD, depression and migraine headache presents to the walk-in clinic for evaluation of left middle finger nodule causing pain for the last 3-4 months. Patient states that it started to cause deformity of his fingernail. He states at times it is painful. He denies any drainage from the area. He denies any injury or trauma. Review of Systems   Constitutional: Negative for chills and fever. HENT: Negative for congestion, ear pain and sore throat. Eyes: Negative for photophobia and visual disturbance. Respiratory: Negative for cough and shortness of breath. Cardiovascular: Negative for chest pain. Gastrointestinal: Negative for abdominal pain, diarrhea, nausea and vomiting. Genitourinary: Negative for difficulty urinating, dysuria, frequency and urgency. Musculoskeletal: Negative for back pain, neck pain and neck stiffness. Skin: Positive for wound. Negative for rash. Neurological: Negative for dizziness, syncope, weakness, light-headedness and headaches. Hematological: Negative for adenopathy. Does not bruise/bleed easily. Psychiatric/Behavioral: Negative for agitation and confusion. All other systems reviewed and are negative.         PMH:     Past Medical History:   Diagnosis Date    Asthma     Depression     GERD (gastroesophageal reflux disease)     Hyperlipidemia     Hypertension     Migraine     Osteoporosis     Right retinal detachment 2019       Past Surgical History:   Procedure Laterality Date    CATARACT REMOVAL WITH IMPLANT Right     COLONOSCOPY N/A 2020    COLONOSCOPY WITH BIOPSY performed by Darrius Tran MD at [facility] Right 2019 RIGHT EYE RETINAL DETACHMENT REPAIR, PARS PLANA VITRECTOMY 25 GAUGE, ENDO LASER GAS FLUID EXCHANGE (SF6)++LATEX ALLERGY++ performed by Russel Gary MD at [address] N/A 1/14/2020    EGD BIOPSY performed by Sukh Lazo MD at [address] [place] [address] N/A 6/18/2020    EGD DILATION BALLOON performed by Sukh Lazo MD at [place] [address]         No family history on file. Medications:     Current Outpatient Medications:     omeprazole (PRILOSEC) 40 MG delayed release capsule, Take 1 capsule by mouth every morning (before breakfast), Disp: 30 capsule, Rfl: 5    FLUoxetine (PROZAC) 40 MG capsule, Take 1 capsule by mouth daily, Disp: 30 capsule, Rfl: 5    atorvastatin (LIPITOR) 10 MG tablet, Take 1 tablet by mouth daily, Disp: 30 tablet, Rfl: 5    amLODIPine (NORVASC) 10 MG tablet, Take 1 tablet by mouth daily, Disp: 30 tablet, Rfl: 5    MULTIPLE VITAMIN PO, daily Ld 6/15/2020, Disp: , Rfl:     butalbital-acetaminophen-caffeine (FIORICET, ESGIC) -40 MG per tablet, Take 1 tablet by mouth every 4 hours as needed for Headaches, Disp: , Rfl:     niacin 500 MG extended release capsule, Take 500 mg by mouth nightly Ld 6/15/2020, Disp: , Rfl:     aspirin 81 MG tablet, Take 81 mg by mouth daily No hold per dr. [place]: , Rfl:     albuterol sulfate HFA (PROVENTIL HFA) 108 (90 Base) MCG/ACT inhaler, Inhale into the lungs every 4 hours as needed Instructed to take am of procedure if needed and bring dos, Disp: , Rfl:     oxyCODONE-acetaminophen (PERCOCET) 5-325 MG per tablet, Take 1 tablet by mouth 2 times daily.  Instructed to take am of procedure, Disp: , Rfl:     lisinopril (PRINIVIL;ZESTRIL) 20 MG tablet, Take 20 mg by mouth 2 times daily , Disp: , Rfl:     atenolol (TENORMIN) 100 MG tablet, Take 100 mg by mouth daily Instructed to take am of procedure, Disp: , Rfl:     Allergies:   No Known Allergies    Social History:     Social History     Tobacco Use    Smoking status: Never Smoker    Smokeless tobacco: Never Used   Substance Use Topics    Alcohol use: Yes     Comment: occ     Drug use: No       Patient lives at home. Physical Exam:     Vitals:    10/06/20 0908   BP: 136/82   Pulse: 66   Resp: 20   Temp: 97.9 °F (36.6 °C)   TempSrc: Temporal   SpO2: 96%   Weight: 218 lb (98.9 kg)   Height: 5' 9\" (1.753 m)       Exam:  Physical Exam  Vitals signs and nursing note reviewed. Constitutional:       General: He is not in acute distress. Appearance: He is well-developed. HENT:      Head: Normocephalic and atraumatic. Eyes:      Conjunctiva/sclera: Conjunctivae normal.      Pupils: Pupils are equal, round, and reactive to light. Neck:      Musculoskeletal: Normal range of motion. Cardiovascular:      Rate and Rhythm: Normal rate and regular rhythm. Pulmonary:      Effort: Pulmonary effort is normal. No respiratory distress. Breath sounds: Normal breath sounds. Abdominal:      General: Bowel sounds are normal.      Palpations: Abdomen is soft. Tenderness: There is no abdominal tenderness. Musculoskeletal: Normal range of motion. Comments: Patient has full range of motion of all the digits of his left hand. There is no obvious deformity. Normal capillary refill. Skin:     General: Skin is warm and dry. Comments: Patient has evidence of a small nodule just distal to the cuticle edge of the left middle finger. There is some deformity noted to the nail matrix. There is a small centralized ulceration. There is no erythema noted. No discharge or drainage. No fluctuance or induration. Neurological:      Mental Status: He is alert and oriented to person, place, and time. Psychiatric:         Behavior: Behavior normal.         Thought Content:  Thought content normal.         Judgment: Judgment normal. Testing:           Medical Decision Making:     Patient upon arrival did not appear toxic or lethargic. Vital signs were reviewed. Past medical history reviewed. Allergies reviewed. Medications reviewed. Patient is presenting with the above complaint of finger pain. Differential diagnosis was discussed with the patient. I will place him on doxycycline. He was referred to dermatology for further evaluation and management. Patient will return for worsening symptoms. Clinical Impression:   Felisha Porras was seen today for finger pain. Diagnoses and all orders for this visit:    Skin lesion    Skin lesion of hand  -     Ti Mar MD,  Dermatology, [facility] ([facility])    Other orders  -     doxycycline hyclate (VIBRA-TABS) 100 MG tablet; Take 1 tablet by mouth 2 times daily for 10 days        The patient is to call for any concerns or return if any of the signs or symptoms worsen. The patient is to follow-up with PCP in the next 2-3 days for repeat evaluation repeat assessment or go directly to the emergency department. SIGNATURE: Karyna Lisa PA-C

## 2020-10-06 NOTE — TELEPHONE ENCOUNTER
Per the order of Dr. Allen Carrasquillo, patient has been scheduled for EUS possible biopsy on 10.23.2020. Patient provided with procedure information during office visit and scheduled for follow up with Dr. Allen Carrasquillo. Patient instructed to please contact our office with any questions. Call placed to surgery scheduling to schedule procedure. Dr. Allen Carrasquillo to enter orders.     Electronically signed by Alfredito Prakash on 10/6/20 at 1:42 PM EDT

## 2020-10-07 NOTE — PROGRESS NOTES
General Surgery Office Note  Former[facility] Surgery  Consandre LISA Carrasquillo MD    Patient's Name/Date of Birth: Christopher Chang / 1950    Date: October 6, 2020     Surgeon: Allen Carrasquillo MD    Chief Complaint:   Chief Complaint   Patient presents with    3 Month Follow-Up       Patient Active Problem List   Diagnosis    Benign essential hypertension    Chronic pain disorder    Dysthymia    Headache    Nontraumatic incomplete tear of right rotator cuff    Cervical disc disease    Chronic bilateral low back pain without sciatica    Mixed hyperlipidemia    Stage 2 chronic kidney disease    Other osteoporosis without current pathological fracture    Idiopathic osteoporosis    Generalized abdominal pain    Gastritis    Duodenal stricture    Adenomatous polyp of colon       Subjective: Doing well. Denies early satiety, weight loss or food getting stuck. Objective:  BP (!) 147/82   Pulse 74   Temp 97.3 °F (36.3 °C) (Temporal)   Ht 5' 9\" (1.753 m)   Wt 205 lb (93 kg)   SpO2 97%   BMI 30.27 kg/m²   Labs:  No results for input(s): WBC, HGB, HCT in the last 72 hours. Invalid input(s): PLR  Lab Results   Component Value Date    CREATININE 1.1 10/08/2019    BUN 17 10/08/2019     04/07/2020    K 5.0 04/07/2020     10/08/2019    CO2 28 10/08/2019     No results for input(s): LIPASE, AMYLASE in the last 72 hours. General appearance: AA, NAD  HEENT: NCAT, PERRLA, EOMI  Lungs: Clear, equal rise bilateral  Heart: Reg  Abdomen: soft, nondistended, nontender, incisions well healed, no signs of infection, no cellulitis, no hematoma  Skin: No lesions, incisions well healed  Psych: No distress, conversive, no hallucinations  : No ulcers or lesions  Rectal: No bleeding    A complete 10 system review was performed and are otherwise negative unless mentioned in the above HPI. Specific negatives are listed below but may not include all those reviewed.     General ROS: negative obtundation, AMS  ENT ROS: negative rhinorrhea, epistaxis  Allergy and Immunology ROS: negative itchy/watery eyes or nasal congestion  Hematological and Lymphatic ROS: negative spontaneous bleeding or bruising  Endocrine ROS: negative  lethargy, mood swings, palpitations or polydipsia/polyuria  Respiratory ROS: negative sputum changes, stridor, tachypnea or wheezing  Cardiovascular ROS: negative for - loss of consciousness, murmur or orthopnea  Gastrointestinal ROS: negative for - hematochezia or hematemesis  Genito-Urinary ROS: negative for -  genital discharge or hematuria  Musculoskeletal ROS: negative for - focal weakness, gangrene  Psych/Neuro ROS: negative for - visual or auditory hallucinations, suicidal ideation      Time spent reviewing past medical, surgical, social and family history, vitals, nursing assessment and images. Imaging:  n/a    Pathology: n/a    Assessment/Plan:  Jesse Lucas is a 72 y.o. male duodenal stricture, r/o path    To OR for EUS  -The procedure, risks, benefits and alternatives were discussed with patient. he   agrees to proceed.     Physician Signature: Electronically signed by Dr. Lobito Clark  10/6/2020  8:33 PM

## 2020-10-12 ENCOUNTER — TELEPHONE (OUTPATIENT)
Dept: PRIMARY CARE CLINIC | Age: 70
End: 2020-10-12

## 2020-10-12 NOTE — TELEPHONE ENCOUNTER
Patient notified derm does not require a referral, patient to call to schedule per Derm request, gave patient their number to call. Dr. Scotty Edmonds  #[phone].

## 2020-10-16 NOTE — PROGRESS NOTES
Raul Aguilar chose [facility] as covid testing site for 10/19. Aware to bring ID and to self isolate.

## 2020-10-19 ENCOUNTER — HOSPITAL ENCOUNTER (OUTPATIENT)
Age: 70
Discharge: HOME | End: 2020-10-21
Payer: MEDICARE

## 2020-10-19 PROCEDURE — U0003 INFECTIOUS AGENT DETECTION BY NUCLEIC ACID (DNA OR RNA); SEVERE ACUTE RESPIRATORY SYNDROME CORONAVIRUS 2 (SARS-COV-2) (CORONAVIRUS DISEASE [COVID-19]), AMPLIFIED PROBE TECHNIQUE, MAKING USE OF HIGH THROUGHPUT TECHNOLOGIES AS DESCRIBED BY CMS-2020-01-R: HCPCS

## 2020-10-21 LAB
SARS-COV-2: NOT DETECTED
SOURCE: NORMAL

## 2020-10-21 NOTE — PROGRESS NOTES
[address] PRE-ADMISSION TESTING ENDOSCOPY INSTRUCTIONS- Merged with [facility]-phone number:[phone]    ENDOSCOPY INSTRUCTIONS:   [x] Bowel prep instructions reviewed. [x] Nothing by mouth after midnight, including gum, candy, mints, or water. Please follow your surgeons instructions if you are required to complete a bowel prep. Colonoscopy- no solid food-only clear liquids the day prior). [x] You may brush your teeth, gargle, but do NOT swallow water. [x] Do not wear makeup, lotions, powders, deodorant. Nail polish as directed by the nurse. [x] Arrange transportation with a responsible adult  to and from the hospital. If you do not have a responsible adult  to transport you, you will need to make arrangements with a medical transportation company (i.e. [employer]. A Uber/taxi/bus is not appropriate unless you are accompanied by a responsible adult ). Arrange for someone to be with you for the remainder of the day and for 24 hours after your procedure due to having had anesthesia. Who will be your  for transportation?______wife ____________   Who will be staying with you for 24 hrs after your procedure? __wife________________    PARKING INSTRUCTIONS:   [x] Arrival Time:__10/23/2020    [address] entrance______________________  · [] Parking lot  \"I\" OR 1 is located on [facility] (the corner of [facility]). To enter, press the button and the gate will lift. A free token will be provided to exit the lot. One car per patient is allowed to park in this lot. All other cars are to park on [address] [place] [address] either in the parking garage or the handicap lot. [x] To reach the [facility] lobby from [address] [place] [address], upon entering the hospital, take elevator B to the 3rd floor. EDUCATION INSTRUCTIONS:  [x] Bring a complete list of your medications, please write the last time you took the medicine, give this list to the nurse.   [x] Take the following medications the morning of surgery with 1-2 ounces of water:   Refer to med sheet                                                                                                                 [x] Stop herbal supplements and vitamins 5 days before your surgery. [] DO NOT take any diabetic medicine the morning of surgery. Follow instructions for insulin the day before surgery. [] If you are diabetic and your blood sugar is low or you feel symptomatic, you may drink 1-2 ounces of apple juice or take a glucose tablet. The morning of your procedure, you may call the pre-op area if you have concerns about your blood sugar [phone]. [x] Use your inhalers the morning of surgery. Bring your emergency inhaler with you day of surgery. [x] Follow physician instructions regarding any blood thinners you may be taking. WHAT TO EXPECT:  [] The day of your procedure you will be greeted and checked in by the [employer].  In addition, you will be registered in the [place] by a Patient Access Representative. Please bring your photo ID and insurance card. A nurse will greet you in accordance to the time you are needed in the pre-op area to prepare you for surgery. Please do not be discouraged if you are not greeted in the order you arrive as there are many variables that are involved in patient preparation. Your patience is greatly appreciated as you wait for your nurse. Please bring in items such as: books, magazines, newspapers, electronics, or any other items  to occupy your time in the waiting area. [x]  Delays may occur. Staff will make a sincere effort to keep you informed of delays. If any delays occur with your procedure, we apologize ahead of time for your inconvenience as we recognize the value of your time.

## 2020-10-23 ENCOUNTER — ANESTHESIA EVENT (OUTPATIENT)
Dept: ENDOSCOPY | Age: 70
End: 2020-10-23
Payer: MEDICARE

## 2020-10-23 ENCOUNTER — HOSPITAL ENCOUNTER (OUTPATIENT)
Age: 70
Setting detail: OUTPATIENT SURGERY
Discharge: HOME | End: 2020-10-23
Attending: SURGERY | Admitting: SURGERY
Payer: MEDICARE

## 2020-10-23 ENCOUNTER — ANESTHESIA (OUTPATIENT)
Dept: ENDOSCOPY | Age: 70
End: 2020-10-23
Payer: MEDICARE

## 2020-10-23 VITALS
WEIGHT: 205 LBS | RESPIRATION RATE: 16 BRPM | HEART RATE: 70 BPM | HEIGHT: 69 IN | SYSTOLIC BLOOD PRESSURE: 141 MMHG | OXYGEN SATURATION: 96 % | DIASTOLIC BLOOD PRESSURE: 73 MMHG | TEMPERATURE: 98.2 F | BODY MASS INDEX: 30.36 KG/M2

## 2020-10-23 VITALS
DIASTOLIC BLOOD PRESSURE: 78 MMHG | OXYGEN SATURATION: 95 % | RESPIRATION RATE: 12 BRPM | SYSTOLIC BLOOD PRESSURE: 110 MMHG

## 2020-10-23 PROCEDURE — 43238 EGD US FINE NEEDLE BX/ASPIR: CPT | Performed by: SURGERY

## 2020-10-23 PROCEDURE — 2580000003 HC RX 258

## 2020-10-23 PROCEDURE — C1753 CATH, INTRAVAS ULTRASOUND: HCPCS | Performed by: SURGERY

## 2020-10-23 PROCEDURE — C1726 CATH, BAL DIL, NON-VASCULAR: HCPCS | Performed by: SURGERY

## 2020-10-23 PROCEDURE — 3609018500 HC EGD US SCOPE W/ADJACENT STRUCTURES: Performed by: SURGERY

## 2020-10-23 PROCEDURE — 3609017700 HC EGD DILATION GASTRIC/DUODENAL STRICTURE: Performed by: SURGERY

## 2020-10-23 PROCEDURE — 43249 ESOPH EGD DILATION <30 MM: CPT | Performed by: SURGERY

## 2020-10-23 PROCEDURE — 3700000001 HC ADD 15 MINUTES (ANESTHESIA): Performed by: SURGERY

## 2020-10-23 PROCEDURE — 7100000010 HC PHASE II RECOVERY - FIRST 15 MIN: Performed by: SURGERY

## 2020-10-23 PROCEDURE — 3700000000 HC ANESTHESIA ATTENDED CARE: Performed by: SURGERY

## 2020-10-23 PROCEDURE — 3609012400 HC EGD TRANSORAL BIOPSY SINGLE/MULTIPLE: Performed by: SURGERY

## 2020-10-23 PROCEDURE — 88305 TISSUE EXAM BY PATHOLOGIST: CPT

## 2020-10-23 PROCEDURE — 6360000002 HC RX W HCPCS

## 2020-10-23 PROCEDURE — 2709999900 HC NON-CHARGEABLE SUPPLY: Performed by: SURGERY

## 2020-10-23 PROCEDURE — 7100000011 HC PHASE II RECOVERY - ADDTL 15 MIN: Performed by: SURGERY

## 2020-10-23 RX ORDER — PROPOFOL 10 MG/ML
INJECTION, EMULSION INTRAVENOUS CONTINUOUS PRN
Status: DISCONTINUED | OUTPATIENT
Start: 2020-10-23 | End: 2020-10-23 | Stop reason: SDUPTHER

## 2020-10-23 RX ORDER — SODIUM CHLORIDE 0.9 % (FLUSH) 0.9 %
10 SYRINGE (ML) INJECTION EVERY 12 HOURS SCHEDULED
Status: DISCONTINUED | OUTPATIENT
Start: 2020-10-23 | End: 2020-10-23 | Stop reason: HOSPADM

## 2020-10-23 RX ORDER — SODIUM CHLORIDE 9 MG/ML
INJECTION, SOLUTION INTRAVENOUS CONTINUOUS PRN
Status: DISCONTINUED | OUTPATIENT
Start: 2020-10-23 | End: 2020-10-23 | Stop reason: SDUPTHER

## 2020-10-23 RX ORDER — PROPOFOL 10 MG/ML
INJECTION, EMULSION INTRAVENOUS PRN
Status: DISCONTINUED | OUTPATIENT
Start: 2020-10-23 | End: 2020-10-23 | Stop reason: SDUPTHER

## 2020-10-23 RX ORDER — SODIUM CHLORIDE 0.9 % (FLUSH) 0.9 %
10 SYRINGE (ML) INJECTION PRN
Status: DISCONTINUED | OUTPATIENT
Start: 2020-10-23 | End: 2020-10-23 | Stop reason: HOSPADM

## 2020-10-23 RX ADMIN — PROPOFOL 40 MG: 10 INJECTION, EMULSION INTRAVENOUS at 08:30

## 2020-10-23 RX ADMIN — PROPOFOL 40 MG: 10 INJECTION, EMULSION INTRAVENOUS at 08:35

## 2020-10-23 RX ADMIN — PROPOFOL 40 MG: 10 INJECTION, EMULSION INTRAVENOUS at 08:41

## 2020-10-23 RX ADMIN — PROPOFOL 100 MCG/KG/MIN: 10 INJECTION, EMULSION INTRAVENOUS at 08:25

## 2020-10-23 RX ADMIN — SODIUM CHLORIDE: 9 INJECTION, SOLUTION INTRAVENOUS at 08:23

## 2020-10-23 RX ADMIN — PROPOFOL 60 MG: 10 INJECTION, EMULSION INTRAVENOUS at 08:25

## 2020-10-23 ASSESSMENT — PULMONARY FUNCTION TESTS
PIF_VALUE: 0

## 2020-10-23 ASSESSMENT — PAIN SCALES - GENERAL
PAINLEVEL_OUTOF10: 0

## 2020-10-23 ASSESSMENT — PAIN - FUNCTIONAL ASSESSMENT: PAIN_FUNCTIONAL_ASSESSMENT: 0-10

## 2020-10-23 NOTE — OP NOTE
Examination of the duodenum at this level revealed thickened mucosa without evidence of infiltration into the submucosa or the muscularis. There were no enlarged periduodenal lymph nodes. The echoendoscope however could not be passed through the area of the stricture. The scope was withdrawn      Specimens:  1. Biopsy duodenal stricture    The Patient was taken to the Endoscopy Recovery area in satisfactory condition.       Electronically signed by Harmeet Bundy MD on 10/23/2020 at 9:39 AM
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
## 2020-10-23 NOTE — H&P
General Surgery History and Physical  [facility]    Patient's Name/Date of Birth: Tana Rizzo / 1950    Date: October 23, 2020     Surgeon: Israel Lovett MD    PCP: Liliana Pérez MD     Chief Complaint: duodenal stricture    HPI:   Tana Rizzo is a 72 y.o. male who presents for evaluation of duodenal stricture. This was found on endoscopy 6 months ago done for epigastric pain. He was found to have peptic duodenitis at that time. He underwent dilation. EGD last month revealed similar findings. He was referred for further management. He denies early satiety or persistent epigastric pain. No weight loss. No significant family history of cancer.        Patient Active Problem List   Diagnosis    Benign essential hypertension    Chronic pain disorder    Dysthymia    Headache    Nontraumatic incomplete tear of right rotator cuff    Cervical disc disease    Chronic bilateral low back pain without sciatica    Mixed hyperlipidemia    Stage 2 chronic kidney disease    Other osteoporosis without current pathological fracture    Idiopathic osteoporosis    Generalized abdominal pain    Gastritis    Duodenal stricture    Adenomatous polyp of colon       Past Medical History:   Diagnosis Date    Asthma     Depression     GERD (gastroesophageal reflux disease)     Hyperlipidemia     Hypertension     Migraine     Osteoporosis     Right retinal detachment 04/2019       Past Surgical History:   Procedure Laterality Date    CATARACT REMOVAL WITH IMPLANT Right     COLONOSCOPY N/A 1/14/2020    COLONOSCOPY WITH BIOPSY performed by Dirk [facility] MD at [facility] Right 4/25/2019    RIGHT EYE RETINAL DETACHMENT REPAIR, PARS PLANA VITRECTOMY 25 GAUGE, ENDO LASER GAS FLUID EXCHANGE (SF6)++LATEX ALLERGY++ performed by Tonie Lyons MD at [place] [address] TOOTH EXTRACTION      UPPER GASTROINTESTINAL ENDOSCOPY N/A 1/14/2020    EGD BIOPSY performed by Sukh Lazo MD at [facility] N/A 6/18/2020    EGD DILATION BALLOON performed by uSkh Lazo MD at [facility]         No Known Allergies    The patient has a family history that is negative for severe cardiovascular or respiratory issues, negative for reaction to anesthesia. Time spent reviewing past medical, surgical, social and family history, vitals, nursing assessment and images. No changes from above documented history.     Social History     Socioeconomic History    Marital status:      Spouse name: Not on file    Number of children: Not on file    Years of education: Not on file    Highest education level: Not on file   Occupational History    Not on file   Social Needs    Financial resource strain: Not on file    Food insecurity     Worry: Not on file     Inability: Not on file    Transportation needs     Medical: Not on file     Non-medical: Not on file   Tobacco Use    Smoking status: Never Smoker    Smokeless tobacco: Never Used   Substance and Sexual Activity    Alcohol use: Yes     Comment: occ     Drug use: No    Sexual activity: Not on file   Lifestyle    Physical activity     Days per week: Not on file     Minutes per session: Not on file    Stress: Not on file   Relationships    Social connections     Talks on phone: Not on file     Gets together: Not on file     Attends Zoroastrianism service: Not on file     Active member of club or organization: Not on file     Attends meetings of clubs or organizations: Not on file     Relationship status: Not on file    Intimate partner violence     Fear of current or ex partner: Not on file     Emotionally abused: Not on file     Physically abused: Not on file     Forced sexual activity: Not on file   Other Topics Concern    Not on file   Social History Narrative  Not on file       I have reviewed relevant labs from this admission and interpretation is included in my assessment and plan    Review of Systems    A complete 10 system review was performed and are otherwise negative unless mentioned in the above HPI. Specific negatives are listed below but may not include all those reviewed. General ROS: negative obtundation, AMS  ENT ROS: negative rhinorrhea, epistaxis  Allergy and Immunology ROS: negative itchy/watery eyes or nasal congestion  Hematological and Lymphatic ROS: negative spontaneous bleeding or bruising  Endocrine ROS: negative  lethargy, mood swings, palpitations or polydipsia/polyuria  Respiratory ROS: negative sputum changes, stridor, tachypnea or wheezing  Cardiovascular ROS: negative for - loss of consciousness, murmur or orthopnea  Gastrointestinal ROS: negative for - hematochezia or hematemesis  Genito-Urinary ROS: negative for -  genital discharge or hematuria  Musculoskeletal ROS: negative for - focal weakness, gangrene  Psych/Neuro ROS: negative for - visual or auditory hallucinations, suicidal ideation    Physical exam:   BP (!) 133/90   Pulse 69   Temp 97.7 °F (36.5 °C) (Temporal)   Resp 20   Ht 5' 9\" (1.753 m)   Wt 205 lb (93 kg)   SpO2 96%   BMI 30.27 kg/m²   General appearance:  NAD, appears stated age  Head: NCAT, PERRLA, EOMI, red conjunctiva  Neck: supple, no masses, trachea midline  Lungs: Equal chest rise bilateral, no retractions, no wheezing  Heart: Reg rate  Abdomen: soft, non distended, non tender.   Skin; warm and dry, no cyanosis  Gu: no cva tenderness  Extremities: atraumatic, no focal motor deficits, no open wounds  Psych: No tremor, visual hallucinations      Radiology: n/a    Assessment:  Scarlet Ernandez is a 72 y.o. male with duodenal stricture  Patient Active Problem List   Diagnosis    Benign essential hypertension    Chronic pain disorder    Dysthymia    Headache    Nontraumatic incomplete tear of right rotator cuff    Cervical disc disease    Chronic bilateral low back pain without sciatica    Mixed hyperlipidemia    Stage 2 chronic kidney disease    Other osteoporosis without current pathological fracture    Idiopathic osteoporosis    Generalized abdominal pain    Gastritis    Duodenal stricture    Adenomatous polyp of colon         Plan:  Proceed with EGD with EUS  -The procedure, risks, benefits and alternatives were discussed with patient. he  agrees to proceed.     Vani Frarell MD  7:23 AM  10/23/2020

## 2020-10-23 NOTE — ANESTHESIA POSTPROCEDURE EVALUATION
Department of Anesthesiology  Postprocedure Note    Patient: Lacy Lay  MRN: [id number]  [place]: 1950  Date of evaluation: 10/23/2020  Time:  2:21 PM     Procedure Summary     Date:  10/23/20 Room / Location:  [facility] / [facility]    Anesthesia Start:  7979 Anesthesia Stop:  [place] [address]    Procedures:       ENDOSCOPIC EGD ULTRASOUND (N/A )      EGD DILATION BALLOON      EGD BIOPSY Diagnosis:  (DUODENAL STRICTURE)    Surgeon:  Ramin Leal MD Responsible Provider:  Diandra Montanez DO    Anesthesia Type:  MAC ASA Status:  3          Anesthesia Type: MAC    Gonzalez Phase I: Gonzalez Score: 10    Gonzalez Phase II: Gonzalez Score: 10    Last vitals: Reviewed and per EMR flowsheets.        Anesthesia Post Evaluation    Patient location during evaluation: PACU  Patient participation: complete - patient participated  Level of consciousness: awake and alert  Pain score: 1  Airway patency: patent  Nausea & Vomiting: no nausea and no vomiting  Complications: no  Cardiovascular status: hemodynamically stable  Respiratory status: acceptable  Hydration status: euvolemic

## 2020-10-23 NOTE — PROGRESS NOTES
Discharge instructions given and reviewed with patient. Patient  & wife=--verbalizes understanding, no questions regarding care.

## 2020-12-28 RX ORDER — OMEPRAZOLE 40 MG/1
40 CAPSULE, DELAYED RELEASE ORAL
Qty: 30 CAPSULE | Refills: 5 | Status: SHIPPED
Start: 2020-12-28 | End: 2021-07-29 | Stop reason: SDUPTHER

## 2020-12-28 NOTE — TELEPHONE ENCOUNTER
Last Appointment:  6/24/2020  Future Appointments   Date Time Provider Xiao Ty   2/4/2021  2:30 PM Bryson De  [address]

## 2021-01-26 ENCOUNTER — TELEPHONE (OUTPATIENT)
Dept: FAMILY MEDICINE CLINIC | Age: 71
End: 2021-01-26

## 2021-01-26 DIAGNOSIS — F34.1 DYSTHYMIA: ICD-10-CM

## 2021-01-26 DIAGNOSIS — I10 BENIGN ESSENTIAL HYPERTENSION: ICD-10-CM

## 2021-01-26 DIAGNOSIS — E78.2 MIXED HYPERLIPIDEMIA: ICD-10-CM

## 2021-01-26 RX ORDER — FLUOXETINE HYDROCHLORIDE 40 MG/1
40 CAPSULE ORAL EVERY MORNING
Qty: 30 CAPSULE | Refills: 0 | Status: SHIPPED
Start: 2021-01-26 | End: 2021-02-04 | Stop reason: SDUPTHER

## 2021-01-26 RX ORDER — AMLODIPINE BESYLATE 10 MG/1
10 TABLET ORAL EVERY MORNING
Qty: 30 TABLET | Refills: 0 | Status: SHIPPED
Start: 2021-01-26 | End: 2021-02-04 | Stop reason: SDUPTHER

## 2021-01-26 RX ORDER — ATORVASTATIN CALCIUM 10 MG/1
10 TABLET, FILM COATED ORAL DAILY
Qty: 30 TABLET | Refills: 0 | Status: SHIPPED
Start: 2021-01-26 | End: 2021-02-04 | Stop reason: SDUPTHER

## 2021-01-26 NOTE — TELEPHONE ENCOUNTER
Name of Medication(s) Requested:  Fluoxetine, Atorvastatin, Amlodeipine    Pharmacy Requested:   [facility]    Medication(s) pended? [x] Yes  [] No    Last Appointment:  6/24/2020    Future appts:  Future Appointments   Date Time Provider Xiao Ty   2/4/2021  2:30 PM Serenity Melton  [address]        Does patient need call back?   [] Yes  [x] No

## 2021-01-26 NOTE — TELEPHONE ENCOUNTER
Requested Prescriptions     Signed Prescriptions Disp Refills    FLUoxetine (PROZAC) 40 MG capsule 30 capsule 0     Sig: Take 1 capsule by mouth every morning     Authorizing Provider: Kinsey Tse    atorvastatin (LIPITOR) 10 MG tablet 30 tablet 0     Sig: Take 1 tablet by mouth daily     Authorizing Provider: Irwin JACKSON    amLODIPine (NORVASC) 10 MG tablet 30 tablet 0     Sig: Take 1 tablet by mouth every morning     Authorizing Provider: Kinsey Tse     Sent   Thanks

## 2021-02-04 ENCOUNTER — OFFICE VISIT (OUTPATIENT)
Dept: FAMILY MEDICINE CLINIC | Age: 71
End: 2021-02-04
Payer: MEDICARE

## 2021-02-04 VITALS
BODY MASS INDEX: 32.58 KG/M2 | WEIGHT: 220 LBS | DIASTOLIC BLOOD PRESSURE: 82 MMHG | HEIGHT: 69 IN | SYSTOLIC BLOOD PRESSURE: 138 MMHG

## 2021-02-04 DIAGNOSIS — G89.29 CHRONIC BILATERAL LOW BACK PAIN WITHOUT SCIATICA: ICD-10-CM

## 2021-02-04 DIAGNOSIS — N18.2 STAGE 2 CHRONIC KIDNEY DISEASE: ICD-10-CM

## 2021-02-04 DIAGNOSIS — E78.2 MIXED HYPERLIPIDEMIA: ICD-10-CM

## 2021-02-04 DIAGNOSIS — M81.8 OTHER OSTEOPOROSIS WITHOUT CURRENT PATHOLOGICAL FRACTURE: ICD-10-CM

## 2021-02-04 DIAGNOSIS — F34.1 DYSTHYMIA: ICD-10-CM

## 2021-02-04 DIAGNOSIS — I10 ESSENTIAL HYPERTENSION: ICD-10-CM

## 2021-02-04 DIAGNOSIS — G89.4 CHRONIC PAIN DISORDER: ICD-10-CM

## 2021-02-04 DIAGNOSIS — K29.70 GASTRITIS WITHOUT BLEEDING, UNSPECIFIED CHRONICITY, UNSPECIFIED GASTRITIS TYPE: ICD-10-CM

## 2021-02-04 DIAGNOSIS — D12.6 ADENOMATOUS POLYP OF COLON, UNSPECIFIED PART OF COLON: ICD-10-CM

## 2021-02-04 DIAGNOSIS — R51.9 CHRONIC NONINTRACTABLE HEADACHE, UNSPECIFIED HEADACHE TYPE: ICD-10-CM

## 2021-02-04 DIAGNOSIS — M50.90 CERVICAL DISC DISEASE: ICD-10-CM

## 2021-02-04 DIAGNOSIS — G89.29 CHRONIC NONINTRACTABLE HEADACHE, UNSPECIFIED HEADACHE TYPE: ICD-10-CM

## 2021-02-04 DIAGNOSIS — K31.5 DUODENAL STRICTURE: Primary | ICD-10-CM

## 2021-02-04 DIAGNOSIS — M54.50 CHRONIC BILATERAL LOW BACK PAIN WITHOUT SCIATICA: ICD-10-CM

## 2021-02-04 DIAGNOSIS — Z00.00 ROUTINE GENERAL MEDICAL EXAMINATION AT A HEALTH CARE FACILITY: ICD-10-CM

## 2021-02-04 DIAGNOSIS — R10.84 GENERALIZED ABDOMINAL PAIN: ICD-10-CM

## 2021-02-04 PROCEDURE — 1123F ACP DISCUSS/DSCN MKR DOCD: CPT | Performed by: INTERNAL MEDICINE

## 2021-02-04 PROCEDURE — G8484 FLU IMMUNIZE NO ADMIN: HCPCS | Performed by: INTERNAL MEDICINE

## 2021-02-04 PROCEDURE — 3017F COLORECTAL CA SCREEN DOC REV: CPT | Performed by: INTERNAL MEDICINE

## 2021-02-04 PROCEDURE — 4040F PNEUMOC VAC/ADMIN/RCVD: CPT | Performed by: INTERNAL MEDICINE

## 2021-02-04 PROCEDURE — G0439 PPPS, SUBSEQ VISIT: HCPCS | Performed by: INTERNAL MEDICINE

## 2021-02-04 RX ORDER — ATORVASTATIN CALCIUM 10 MG/1
10 TABLET, FILM COATED ORAL DAILY
Qty: 30 TABLET | Refills: 5 | Status: SHIPPED
Start: 2021-02-04 | End: 2021-07-29 | Stop reason: SDUPTHER

## 2021-02-04 RX ORDER — FLUOXETINE HYDROCHLORIDE 40 MG/1
40 CAPSULE ORAL EVERY MORNING
Qty: 30 CAPSULE | Refills: 5 | Status: SHIPPED
Start: 2021-02-04 | End: 2021-07-29 | Stop reason: SDUPTHER

## 2021-02-04 RX ORDER — AMLODIPINE BESYLATE 10 MG/1
10 TABLET ORAL EVERY MORNING
Qty: 30 TABLET | Refills: 5 | Status: SHIPPED
Start: 2021-02-04 | End: 2021-07-29 | Stop reason: SDUPTHER

## 2021-02-04 ASSESSMENT — PATIENT HEALTH QUESTIONNAIRE - PHQ9
SUM OF ALL RESPONSES TO PHQ9 QUESTIONS 1 & 2: 1
SUM OF ALL RESPONSES TO PHQ QUESTIONS 1-9: 1
SUM OF ALL RESPONSES TO PHQ QUESTIONS 1-9: 1
2. FEELING DOWN, DEPRESSED OR HOPELESS: 1

## 2021-02-04 ASSESSMENT — LIFESTYLE VARIABLES
HOW OFTEN DO YOU HAVE A DRINK CONTAINING ALCOHOL: 2
HOW OFTEN DURING THE LAST YEAR HAVE YOU FAILED TO DO WHAT WAS NORMALLY EXPECTED FROM YOU BECAUSE OF DRINKING: 0
HOW OFTEN DO YOU HAVE SIX OR MORE DRINKS ON ONE OCCASION: 0
AUDIT TOTAL SCORE: 2
HAVE YOU OR SOMEONE ELSE BEEN INJURED AS A RESULT OF YOUR DRINKING: 0
HOW OFTEN DURING THE LAST YEAR HAVE YOU FOUND THAT YOU WERE NOT ABLE TO STOP DRINKING ONCE YOU HAD STARTED: 0

## 2021-02-04 ASSESSMENT — ENCOUNTER SYMPTOMS
ABDOMINAL PAIN: 1
SHORTNESS OF BREATH: 0
NAUSEA: 0
COUGH: 0
SORE THROAT: 0
VOMITING: 0
BLOOD IN STOOL: 0
CHEST TIGHTNESS: 0
EYE PAIN: 0
RHINORRHEA: 0
CONSTIPATION: 0
DIARRHEA: 0

## 2021-02-04 NOTE — PATIENT INSTRUCTIONS
Personalized Preventive Plan for Imer Castillo - 2/4/2021  Medicare offers a range of preventive health benefits. Some of the tests and screenings are paid in full while other may be subject to a deductible, co-insurance, and/or copay. Some of these benefits include a comprehensive review of your medical history including lifestyle, illnesses that may run in your family, and various assessments and screenings as appropriate. After reviewing your medical record and screening and assessments performed today your provider may have ordered immunizations, labs, imaging, and/or referrals for you. A list of these orders (if applicable) as well as your Preventive Care list are included within your After Visit Summary for your review. Other Preventive Recommendations:    · A preventive eye exam performed by an eye specialist is recommended every 1-2 years to screen for glaucoma; cataracts, macular degeneration, and other eye disorders. · A preventive dental visit is recommended every 6 months. · Try to get at least 150 minutes of exercise per week or 10,000 steps per day on a pedometer . · Order or download the FREE \"Exercise & Physical Activity: Your Everyday Guide\" from [employer]. Call 0-631.158.9438 or search [employer] online. · You need 8505-1497 mg of calcium and 6964-8450 IU of vitamin D per day. It is possible to meet your calcium requirement with diet alone, but a vitamin D supplement is usually necessary to meet this goal.  · When exposed to the sun, use a sunscreen that protects against both UVA and UVB radiation with an SPF of 30 or greater. Reapply every 2 to 3 hours or after sweating, drying off with a towel, or swimming. · Always wear a seat belt when traveling in a car. Always wear a helmet when riding a bicycle or motorcycle. Heart-Healthy Diet   Sodium, Fat, and Cholesterol Controlled Diet       What Is a Heart Healthy Diet? A heart-healthy diet is one that limits sodium , certain types of fat , and cholesterol . This type of diet is recommended for:   People with any form of cardiovascular disease (eg, coronary heart disease , peripheral vascular disease , previous heart attack , previous stroke )   People with risk factors for cardiovascular disease, such as high blood pressure , high cholesterol , or diabetes   Anyone who wants to lower their risk of developing cardiovascular disease   Sodium    Sodium is a mineral found in many foods. In general, most people consume much more sodium than they need. Diets high in sodium can increase blood pressure and lead to edema (water retention). On a heart-healthy diet, you should consume no more than 2,300 mg (milligrams) of sodium per dayabout the amount in one teaspoon of table salt. The foods highest in sodium include table salt (about 50% sodium), processed foods, convenience foods, and preserved foods. Cholesterol    Cholesterol is a fat-like, waxy substance in your blood. Our bodies make some cholesterol. It is also found in animal products, with the highest amounts in fatty meat, egg yolks, whole milk, cheese, shellfish, and organ meats. On a heart-healthy diet, you should limit your cholesterol intake to less than 200 mg per day. It is normal and important to have some cholesterol in your bloodstream. But too much cholesterol can cause plaque to build up within your arteries, which can eventually lead to a heart attack or stroke. The two types of cholesterol that are most commonly referred to are:   Low-density lipoprotein (LDL) cholesterol  Also known as bad cholesterol, this is the cholesterol that tends to build up along your arteries. Bad cholesterol levels are increased by eating fats that are saturated or hydrogenated. Optimal level of this cholesterol is less than 100. Over 130 starts to get risky for heart disease. High-density lipoprotein (HDL) cholesterol  Also known as good cholesterol, this type of cholesterol actually carries cholesterol away from your arteries and may, therefore, help lower your risk of having a heart attack. You want this level to be high (ideally greater than 60). It is a risk to have a level less than 40. You can raise this good cholesterol by eating olive oil, canola oil, avocados, or nuts. Exercise raises this level, too. Fat    Fat is calorie dense and packs a lot of calories into a small amount of food. Even though fats should be limited due to their high calorie content, not all fats are bad. In fact, some fats are quite healthful. Fat can be broken down into four main types.    The good-for-you fats are:   Monounsaturated fat  found in oils such as olive and canola, avocados, and nuts and natural nut butters; can decrease cholesterol levels, while keeping levels of HDL cholesterol high   Polyunsaturated fat  found in oils such as safflower, sunflower, soybean, corn, and sesame; can decrease total cholesterol and LDL cholesterol   Omega-3 fatty acids  particularly those found in fatty fish (such as salmon, trout, tuna, mackerel, herring, and sardines); can decrease risk of arrhythmias, decrease triglyceride levels, and slightly lower blood pressure   The fats that you want to limit are:   Saturated fat  found in animal products, many fast foods, and a few vegetables; increases total blood cholesterol, including LDL levels   Animal fats that are saturated include: butter, lard, whole-milk dairy products, meat fat, and poultry skin   Vegetable fats that are saturated include: hydrogenated shortening, palm oil, coconut oil, cocoa butter   Hydrogenated or trans fat  found in margarine and vegetable shortening, most shelf stable snack foods, and fried foods; increases LDL and decreases HDL Lean cuts of fresh or frozen beef, veal, lamb, or pork (look for the word loin) Fresh or frozen poultry without the skin Fresh or frozen fish and some shellfish Egg whites and egg substitutes (Limit whole eggs to three per week) Tofu Nuts or seeds (unsalted, dry-roasted), low-sodium peanut butter Dried peas, beans, and lentils   Any smoked, cured, salted, or canned meat, fish, or poultry (including hager, chipped beef, cold cuts, hot dogs, sausages, sardines, and anchovies) Poultry skins Breaded and/or fried fish or meats Canned peas, beans, and lentils Salted nuts   Fats and Oils   Olive oil and canola oil Low-sodium, low-fat salad dressings and mayonnaise   Butter, margarine, coconut and palm oils, hager fat   Snacks, Sweets, and Condiments   Low-sodium or unsalted versions of broths, soups, soy sauce, and condiments Pepper, herbs, and spices; vinegar, lemon, or lime juice Low-fat frozen desserts (yogurt, sherbet, fruit bars) Sugar, cocoa powder, honey, syrup, jam, and preserves Low-fat, trans-fat free cookies, cakes, and pies [place]ford and animal crackers, fig bars, giovanni snaps   High-fat desserts Broth, soups, gravies, and sauces, made from instant mixes or other high-sodium ingredients Salted snack foods Canned olives Meat tenderizers, seasoning salt, and most flavored vinegars   Beverages   Low-sodium carbonated beverages Tea and coffee in moderation Soy milk   Commercially softened water   Suggestions   Make whole grains, fruits, and vegetables the base of your diet. Choose heart-healthy fats such as canola, olive, and flaxseed oil, and foods high in heart-healthy fats, such as nuts, seeds, soybeans, tofu, and fish. Eat fish at least twice per week; the fish highest in omega-3 fatty acids and lowest in mercury include salmon, herring, mackerel, sardines, and canned chunk light tuna. If you eat fish less than twice per week or have high triglycerides, talk to your doctor about taking fish oil supplements. Read food labels. For products low in fat and cholesterol, look for fat free, low-fat, cholesterol free, saturated fat free, and trans fat freeAlso scan the Nutrition Facts Label, which lists saturated fat, trans fat, and cholesterol amounts. For products low in sodium, look for sodium free, very low sodium, low sodium, no added salt, and unsalted   Skip the salt when cooking or at the table; if food needs more flavor, get creative and try out different herbs and spices. Garlic and onion also add substantial flavor to foods. Trim any visible fat off meat and poultry before cooking, and drain the fat off after lino. Use cooking methods that require little or no added fat, such as grilling, boiling, baking, poaching, broiling, roasting, steaming, stir-frying, and sauting. Avoid fast food and convenience food. They tend to be high in saturated and trans fat and have a lot of added salt. Talk to a registered dietitian for individualized diet advice. Last Reviewed: March 2011 Norma De Guzman MS, MPH, RD   Updated: 3/29/2011   ·     Keep Your Memory Mani Goodness       Many factors can affect your ability to remembera hectic lifestyle, aging, stress, chronic disease, and certain medicines. But, there are steps you can take to sharpen your mind and help preserve your memory. Challenge Your Brain   Regularly challenging your mind may help keeps it in top shape. Good mental exercises include:   Crossword puzzlesUse a dictionary if you need it; you will learn more that way. Brainteasers Try some!    Crafts, such as wood working and [facility], such as gardening and building model airplanes SocializingVisit old friends or join groups to meet new ones. Reading   Learning a new language   Taking a class, whether it be art history or sadi chi   TravelingExperience the food, history, and culture of your destination   Learning to use a computer   Going to museums, the theater, or thought-provoking movies   Changing things in your daily life, such as reversing your pattern in the grocery store or brushing your teeth using your nondominant hand   Use Memory Aids   There is no need to remember every detail on your own. These memory aids can help:   Calendars and day planners   Electronic organizers to store all sorts of helpful informationThese devices can \"beep\" to remind you of appointments. A book of days to record birthdays, anniversaries, and other occasions that occur on the same date every year   Detailed \"to-do\" lists and strategically placed sticky notes   Quick \"study\" sessionsBefore a gathering, review who will be there so their names will be fresh in your mind. Establish routinesFor example, keep your keys, wallet, and umbrella in the same place all the time or take medicine with your 8:00 AM glass of juice   Live a Healthy Life   Many actions that will keep your body strong will do the same for your mind. For example:   Talk to Your Doctor About Herbs and Supplements    Malnutrition and vitamin deficiencies can impair your mental function. For example, vitamin B12 deficiency can cause a range of symptoms, including confusion. But, what if your nutritional needs are being met? Can herbs and supplements still offer a benefit? Researchers have investigated a range of natural remedies, such as ginkgo , ginseng , and the supplement phosphatidylserine (PS). So far, though, the evidence is inconsistent as to whether these products can improve memory or thinking. Side effects of medicine (eg, dizziness, blurred vision)especially medicines like prescription pain medicines and drugs used to treat mental health conditions   Depending on the brittleness of your bones, the consequences of a fall can be serious and long lasting. Home Life   Research by the Association of Aging [facility]) shows that some home accidents among older adults can be prevented by making simple lifestyle changes and basic modifications and repairs to the home environment. Here are some lifestyle changes that experts recommend:   Have your hearing and vision checked regularly. Be sure to wear prescription glasses that are right for you. Speak to your doctor or pharmacist about the possible side effects of your medicines. A number of medicines can cause dizziness. If you have problems with sleep, talk to your doctor. Limit your intake of alcohol. If necessary, use a cane or walker to help maintain your balance. Wear supportive, rubber-soled shoes, even at home. If you live in a region that gets wintry weather, you may want to put special cleats on your shoes to prevent you from slipping on the snow and ice. Exercise regularly to help maintain muscle tone, agility, and balance. Always hold the banister when going up or down stairs. Also, use  bars when getting in or out of the bath or shower, or using the toilet. To avoid dizziness, get up slowly from a lying down position. Sit up first, dangling your legs for a minute or two before rising to a standing position. Overall Home Safety Check   According to the Consumer Product Safety Commision's \"Older Consumer Home Safety Checklist,\" it is important to check for potential hazards in each room. And remember, proper lighting is an essential factor in home safety. If you cannot see clearly, you are more likely to fall.    Important questions to ask yourself include: Are lamp, electric, extension, and telephone cords placed out of the flow of traffic and maintained in good condition? Have frayed cords been replaced? Are all small rugs and runners slip resistant? If not, you can secure them to the floor with a special double-sided carpet tape. Are smoke detectors properly locatedone on every floor of your home and one outside of every sleeping area? Are they in good working order? Are batteries replaced at least once a year? Do you have a well-maintained carbon monoxide detector outside every sleeping are in your home? Does your furniture layout leave plenty of space to maneuver between and around chairs, tables, beds, and sofas? Are hallways, stairs and passages between rooms well lit? Can you reach a lamp without getting out of bed? Are floor surfaces well maintained? Shag rugs, high-pile carpeting, tile floors, and polished wood floors can be particularly slippery. Stairs should always have handrails and be carpeted or fitted with a non-skid tread. Is your telephone easily reachable. Is the cord safely tucked away? Room by Room   According to the Association of Aging, bathrooms and sam are the two most potentially hazardous rooms in your home. In the Kitchen    Be sure your stove is in proper working order and always make sure burners and the oven are off before you go out or go to sleep. Keep pots on the back burners, turn handles away from the front of the stove, and keep stove clean and free of grease build-up. Kitchen ventilation systems and range exhausts should be working properly. Keep flammable objects such as towels and pot holders away from the cooking area except when in use. Make sure kitchen curtains are tied back. Move cords and appliances away from the sink and hot surfaces. If extension cords are needed, install wiring guides so they do not hang over the sink, range, or working areas. Look for coffee pots, kettles and toaster ovens with automatic shut-offs. Keep a mop handy in the kitchen so you can wipe up spills instantly. You should also have a small fire extinguisher. Arrange your kitchen with frequently used items on lower shelves to avoid the need to stand on a stepstool to reach them. Make sure countertops are well-lit to avoid injuries while cutting and preparing food. In the Bathroom    Use a non-slip mat or decals in the tub and shower, since wet, soapy tile or porcelain surfaces are extremely slippery. Make sure bathroom rugs are non-skid or tape them firmly to the floor. Bathtubs should have at least one, preferably two, grab bars, firmly attached to structural supports in the wall. (Do not use built-in soap holders or glass shower doors as grab bars.)    Tub seats fitted with non-slip material on the legs allow you to wash sitting down. For people with limited mobility, bathtub transfer benches allow you to slide safely into the tub. Raised toilet seats and toilet safety rails are helpful for those with knee or hip problems. In the [facility]    Make sure you use a nightlight and that the area around your bed is clear of potential obstacles. Be careful with electric blankets and never go to sleep with a heating pad, which can cause serious burns even if on a low setting. Use fire-resistant mattress covers and pillows, and NEVER smoke in bed. Keep a phone next to the bed that is programmed to dial 911 at the push of a button. If you have a chronic condition, you may want to sign on with an automatic call-in service. Typically the system includes a small pendant that connects directly to an emergency medical voice-response system. You should also make arrangements to stay in contact with someonefriend, neighbor, family memberon a regular schedule.    Fire Prevention According to the National S.A.F.E. (Smoke Alarms for Every) [address] [facility], senior citizens are one of the two highest risk groups for death and serious injuries due to residential fires. When cooking, wear short-sleeved items, never a bulky long-sleeved robe. The [facility]'s Safety Checklist for Older Consumers emphasizes the importance of checking basements, garages, workshops and storage areas for fire hazards, such as volatile liquids, piles of old rags or clothing and overloaded circuits. Never smoke in bed or when lying down on a couch or recliner chair. Small portable electric or kerosene heaters are responsible for many home fires and should be used cautiously if at all. If you do use one, be sure to keep them away from flammable materials. In case of fire, make sure you have a pre-established emergency exit plan. Have a professional check your fireplace and other fuel-burning appliances yearly. [employer]   Baby boomers entering the bar[place] years will continue to see the development of new products to help older adults live safely and independently in spite of age-related changes. Making Life More Livable  , by Yessi Hernandez, lists over 1,000 products for \"living well in the mature years,\" such as bathing and mobility aids, household security devices, ergonomically designed knives and peelers, and faucet valves and knobs for temperature control. Medical supply stores and organizations are good sources of information about products that improve your quality of life and insure your safety.      Last Reviewed: November 2009 Cheng Perez MD   Updated: 3/7/2011     ·        Advance Care Planning: Care Instructions  Your Care Instructions It can be hard to live with an illness that cannot be cured. But if your health is getting worse, you may want to make decisions about end-of-life care. Planning for the end of your life does not mean that you are giving up. It is a way to make sure that your wishes are met. Clearly stating your wishes can make it easier for your loved ones. Making plans while you are still able may also ease your mind and make your final days less stressful and more meaningful. Follow-up care is a key part of your treatment and safety. Be sure to make and go to all appointments, and call your doctor if you are having problems. It's also a good idea to know your test results and keep a list of the medicines you take. What can you do to plan for the end of life? You can bring these issues up with your doctor. You do not need to wait until your doctor starts the conversation. You might start with \"I would not be willing to live with . Shawnee Dues Shawnee Dues Shawnee Dues \" When you complete this sentence it helps your doctor understand your wishes. Talk openly and honestly with your doctor. This is the best way to understand the decisions you will need to make as your health changes. Know that you can always change your mind. Ask your doctor about commonly used life-support measures. These include tube feedings, breathing machines, and fluids given through a vein (IV). Understanding these treatments will help you decide whether you want them. You may choose to have these life-supporting treatments for a limited time. This allows a trial period to see whether they will help you. You may also decide that you want your doctor to take only certain measures to keep you alive. It is important to spell out these conditions so that your doctor and family understand them. Talk to your doctor about how long you are likely to live. He or she may be able to give you an idea of what usually happens with your specific illness. These legal papers are simple to change. Tell your doctor what you want to change, and ask him or her to make a note in your medical file. Give your family updated copies of the papers. Where can you learn more? Go to https://chpenidia.[email or web address] org and sign in to your [employer] account. Enter P184 in the [place] [employer] box to learn more about \"Advance Care Planning: Care Instructions. \"     If you do not have an account, please click on the \"Sign Up Now\" link. Current as of: December 9, 2019               Content Version: 12.6  © 1513-8074 [employer]. Care instructions adapted under license by [facility] ([facility]). If you have questions about a medical condition or this instruction, always ask your healthcare professional. [address] any warranty or liability for your use of this information. ·        Learning About Living Perroy  What is a living will? A living will, also called a declaration, is a legal form. It tells your family and your doctor your wishes when you can't speak for yourself. It's used by the health professionals who will treat you as you near the end of your life or if you get seriously hurt or ill. If you put your wishes in writing, your loved ones and others will know what kind of care you want. They won't need to guess. This can ease your mind and be helpful to others. And you can change or cancel your living will at any time. A living will is not the same as an estate or property will. An estate will explains what you want to happen with your money and property after you die. How do you use it? A living will is used to describe the kinds of treatment or life support you want as you near the end of your life or if you get seriously hurt or ill. Keep these facts in mind about living chatterjee. Your living will is used only if you can't speak or make decisions for yourself. Most often, one or more doctors must certify that you can't speak or decide for yourself before your living will takes effect. If you get better and can speak for yourself again, you can accept or refuse any treatment. It doesn't matter what you said in your living will. Some states may limit your right to refuse treatment in certain cases. For example, you may need to clearly state in your living will that you don't want artificial hydration and nutrition, such as being fed through a tube. Is a living will a legal document? A living will is a legal document. Each state has its own laws about living chatterjee. And a living will may be called something else in your state. Here are some things to know about living chatterjee. You don't need an  to complete a living will. But legal advice can be helpful if your state's laws are unclear. It can also help if your health history is complicated or your family can't agree on what should be in your living will. You can change your living will at any time. Some people find that their wishes about end-of-life care change as their health changes. If you make big changes to your living will, complete a new form. If you move to another state, make sure that your living will is legal in the state where you now live. In most cases, doctors will respect your wishes even if you have a form from a different state. You might use a universal form that has been approved by many states. This kind of form can sometimes be filled out and stored online. Your digital copy will then be available wherever you have a connection to the internet. The doctors and nurses who need to treat you can find it right away. Your state may offer an online registry. This is another place where you can store your living will online. It's a good idea to get your living will notarized. This means using a person called a  to watch two people sign, or witness, your living will. What should you know when you create a living will? Here are some questions to ask yourself as you make your living will:  Do you know enough about life support methods that might be used? If not, talk to your doctor so you know what might be done if you can't breathe on your own, your heart stops, or you can't swallow. What things would you still want to be able to do after you receive life-support methods? Would you want to be able to walk? To speak? To eat on your own? To live without the help of machines? Do you want certain Amish practices performed if you become very ill? If you have a choice, where do you want to be cared for? In your home? At a hospital or nursing home? If you have a choice at the end of your life, where would you prefer to die? At home? In a hospital or nursing home? Somewhere else? Would you prefer to be buried or cremated? Do you want your organs to be donated after you die? What should you do with your living will? Make sure that your family members and your health care agent have copies of your living will (also called a declaration). Give your doctor a copy of your living will. Ask him or her to keep it as part of your medical record. If you have more than one doctor, make sure that each one has a copy. Put a copy of your living will where it can be easily found. For example, some people may put a copy on their refrigerator door. If you are using a digital copy, be sure your doctor, family members, and health care agent know how to find and access it. Where can you learn more? Go to [email or web address] and sign in to your [facility] account. Enter B656 in the [employer] box to learn more about \"Learning About Living Anh Hurley. \" If you do not have an account, please click on the \"Sign Up Now\" link. Current as of: December 9, 2019               Content Version: 12.6  © 2006-2020 [employer]. Care instructions adapted under license by [facility] ([facility]). If you have questions about a medical condition or this instruction, always ask your healthcare professional. [address] any warranty or liability for your use of this information. ·        Learning About Medical Power of   What is a medical power of ? A medical power of , also called a durable power of  for health care, is one type of the legal forms called advance directives. It lets you name the person you want to make treatment decisions for you if you can't speak or decide for yourself. The person you choose is called your health care agent. This person is also called a health care proxy or health care surrogate. A medical power of  may be called something else in your state. How do you choose a health care agent? Choose your health care agent carefully. This person may or may not be a family member. Talk to the person before you make your final decision. Make sure he or she is comfortable with this responsibility. It's a good idea to choose someone who:  Is at least 25years old. Knows you well and understands what makes life meaningful for you. Understands your Yarsani and moral values. Will do what you want, not what he or she wants. Will be able to make difficult choices at a stressful time. Will be able to refuse or stop treatment, if that is what you would want, even if you could die. Will be firm and confident with health professionals if needed. Will ask questions to get needed information. Lives near you or agrees to travel to you if needed. Care instructions adapted under license by [facility] ([facility]). If you have questions about a medical condition or this instruction, always ask your healthcare professional. [place] [address] any warranty or liability for your use of this information.     ·

## 2021-02-04 NOTE — PROGRESS NOTES
Medicare Annual Wellness Visit  Name: Ramsey Spring Date: 2021   MRN: [id number] Sex: Male   Age: 72 y.o. Ethnicity: Non-/Non    : 1950 Race: Wilbert Stevens is here for Medicare AWV    Screenings for behavioral, psychosocial and functional/safety risks, and cognitive dysfunction are all negative except as indicated below. These results, as well as other patient data from the [address] form, are documented in Flowsheets linked to this Encounter. No Known Allergies      Prior to Visit Medications    Medication Sig Taking? Authorizing Provider   amLODIPine (NORVASC) 10 MG tablet Take 1 tablet by mouth every morning Yes Angela Juarez MD   atorvastatin (LIPITOR) 10 MG tablet Take 1 tablet by mouth daily Yes Angela Juarez MD   FLUoxetine (PROZAC) 40 MG capsule Take 1 capsule by mouth every morning Yes Angela Juarez MD   omeprazole (PRILOSEC) 40 MG delayed release capsule Take 1 capsule by mouth every morning (before breakfast) Yes Angela Juarez MD   MULTIPLE VITAMIN PO daily Ld 6/15/2020 Yes Historical Provider, MD   butalbital-acetaminophen-caffeine (FIORICET, ESGIC) -40 MG per tablet Take 1 tablet by mouth every 4 hours as needed for Headaches Yes Historical Provider, MD   aspirin 81 MG tablet Take 81 mg by mouth daily  Yes Historical Provider, MD   albuterol sulfate HFA (PROVENTIL HFA) 108 (90 Base) MCG/ACT inhaler Inhale into the lungs every 4 hours as needed Instructed to take am of procedure if needed and bring dos Yes Historical Provider, MD   oxyCODONE-acetaminophen (PERCOCET) 5-325 MG per tablet Take 1 tablet by mouth 2 times daily.  Instructed to take am of procedure Yes Historical Provider, MD   lisinopril (PRINIVIL;ZESTRIL) 20 MG tablet Take 20 mg by mouth 2 times daily  Yes Historical Provider, MD   atenolol (TENORMIN) 100 MG tablet Take 100 mg by mouth every morning  Yes Historical Provider, MD   niacin 500 MG extended release capsule Take 500 mg by mouth nightly Ld 6/15/2020  Historical Provider, MD         Past Medical History:   Diagnosis Date    Asthma     Depression     GERD (gastroesophageal reflux disease)     Hyperlipidemia     Hypertension     Migraine     Osteoporosis     Right retinal detachment 04/2019       Past Surgical History:   Procedure Laterality Date    CATARACT REMOVAL WITH IMPLANT Right     COLONOSCOPY N/A 1/14/2020    COLONOSCOPY WITH BIOPSY performed by Elizabeth Kahn MD at [address] Right 4/25/2019    RIGHT EYE RETINAL DETACHMENT REPAIR, PARS PLANA VITRECTOMY 25 GAUGE, ENDO LASER GAS FLUID EXCHANGE (SF6)++LATEX ALLERGY++ performed by Annamaria Wallis MD at [address] N/A 1/14/2020    EGD BIOPSY performed by Elizabeth Kahn MD at [address] N/A 6/18/2020    EGD DILATION BALLOON performed by Elizabeth Kahn MD [address] 10/23/2020    ENDOSCOPIC EGD ULTRASOUND performed by Duane Senior MD at [address] [facility]  10/23/2020    EGD DILATION BALLOON performed by Duane Senior MD at [address] [facility]  10/23/2020    EGD BIOPSY performed by Duane Senior MD at [address] [facility] [facility]         No family history on file.     CareTeam (Including outside providers/suppliers regularly involved in providing care):   Patient Care Team:  Celia Gutierrez MD as PCP - General (Internal Medicine)  Celia Gutierrez MD as PCP - [facility] Nalini Early Provider    Wt Readings from Last 3 Encounters:   02/04/21 220 lb (99.8 kg)   10/23/20 205 lb (93 kg)   10/06/20 205 lb (93 kg)     Vitals:    02/04/21 1435 02/04/21 1524   BP:  138/82   Site:  Left Upper Arm   Position: Sitting   Cuff Size:  Large Adult   Weight: 220 lb (99.8 kg)    Height: 5' 9\" (1.753 m)      Body mass index is 32.49 kg/m². Based upon direct observation of the patient, evaluation of cognition reveals recent and remote memory intact. Patient's complete Health Risk Assessment and screening values have been reviewed and are found in Flowsheets. The following problems were reviewed today and where indicated follow up appointments were made and/or referrals ordered. Positive Risk Factor Screenings with Interventions:            General Health and ACP:  General  In general, how would you say your health is?: Fair  In the past 7 days, have you experienced any of the following?  New or Increased Pain, New or Increased Fatigue, Loneliness, Social Isolation, Stress or Anger?: None of These  Do you get the social and emotional support that you need?: Yes  Do you have a Living Will?: (!) No  Advance Directives     Power of  Living Will ACP-Advance Directive ACP-Power of     Not on File Not on File Not on File Not on File      General Health Risk Interventions:  · No Living Will: handouts provided     Health Habits/Nutrition:  Health Habits/Nutrition  Do you exercise for at least 20 minutes 2-3 times per week?: Yes  Have you lost any weight without trying in the past 3 months?: No  Do you eat only one meal per day?: No  Have you seen the dentist within the past year?: (!) No  Body mass index: (!) 32.48  Health Habits/Nutrition Interventions:  · Dental exam overdue:  patient encouraged to make appointment with his/her dentist       Personalized Preventive Plan   Current Health Maintenance Status  Immunization History   Administered Date(s) Administered    Influenza Vaccine, unspecified formulation 09/14/2015, 09/26/2016, 10/04/2017    Influenza Virus Vaccine 09/14/2015, 09/26/2016, 10/04/2017    Influenza, High Dose (Fluzone 65 yrs and older) 09/26/2016, 10/04/2017    Influenza, Triv, inactivated, subunit, adjuvanted, IM (Fluad 65 yrs and older) 10/13/2018, 09/28/2019    Pneumococcal Conjugate 13-valent ([id number]) 09/26/2016    Pneumococcal Polysaccharide ([id number]) 10/04/2017    Zoster Recombinant (Shingrix) 01/16/2020, 06/04/2020        Health Maintenance   Topic Date Due    Hepatitis C screen  1950    Annual Wellness Visit (AWV)  06/23/2019    Lipid screen  04/07/2021    Potassium monitoring  04/07/2021    Creatinine monitoring  04/07/2021    Colon cancer screen colonoscopy  01/14/2025    DTaP/Tdap/Td vaccine (2 - Td) 03/20/2025    Flu vaccine  Completed    Shingles Vaccine  Completed    Pneumococcal 65+ years Vaccine  Completed    Hepatitis A vaccine  Aged Out    Hepatitis B vaccine  Aged Out    Hib vaccine  Aged Out    Meningococcal (ACWY) vaccine  Aged Out     Recommendations for [place] Due: see orders and patient instructions/AVS.  . Recommended screening schedule for the next 5-10 years is provided to the patient in written form: see Patient Dmitry Bragg was seen today for medicare awv. Diagnoses and all orders for this visit:    Routine general medical examination at a health care facility    Health Maintenance   - immunizations:   Influenza Vaccination  - (9/14/2015) , (2017), (2019), (2020)   Pneumonia Vaccination - (10/2016) - prevnar, (10/2017) - pneumococcal 23  Zoster/Shingles Vaccine - (2020) x 2   Tetanus Vaccination  - (2015[facility] - [facility]  covid (1/2021) #1 - [facility] pfizer     - Screenings:   Bone Density Test Screening - (10/25/2018)  Colonoscopy - (2014), (1/2020) - Minimal diverticulosis, sigmoid polyp x2 s/p polypectomy 3 to 4 mm, hemrrhoid, path - tubular and hyperplastic, follow-up in 2-3 years  Prostate     Return for Medicare Annual Wellness Visit in 1 year. No orders of the defined types were placed in this encounter.     Requested Prescriptions     Signed Prescriptions Disp Refills    amLODIPine (NORVASC) 10 MG tablet 30 tablet 5 Sig: Take 1 tablet by mouth every morning    atorvastatin (LIPITOR) 10 MG tablet 30 tablet 5     Sig: Take 1 tablet by mouth daily    FLUoxetine (PROZAC) 40 MG capsule 30 capsule 5     Sig: Take 1 capsule by mouth every morning     Electronically signed by Lennox Mendez MD on 2/4/2021 at 3:24 PM

## 2021-02-04 NOTE — PROGRESS NOTES
[facility]) Physicians   Internal Medicine     2021  Merlene Lazo : 1950 Sex: male  Age:77 y.o. Chief Complaint   Patient presents with    Medicare AWV        HPI:   Patient presents to office for evaluation of the following medical concerns. - States having generalized abdominal pain, has improved and stable. Found duodenal stricture. States has had EGD and colonoscopy (2020)- showed gastritis and duodenal stricture, had follow up EGD and had dilation (2020)  - referred to endo surgery. Had EGD with EUS (10/20) - Duodenal stricture just distal to the duodenal bulb path - blood with few benign glandular cells. States some left sided soreness. On omeprazole. No nausea, no vomiting. No diarrhea or constipation. No melena or hematochezia. For follow up in -10/2021.     - States has hypertension. States does check blood pressure at home, blood pressure 120-130's/70-80. Occasional in 140's. On amlodipine, lisinopril and atenolol. No reported side effects.     - Has high cholesterol. Trying to watch diet. On atorvastatin. No reported side effects.     - States has depression. On Prozac 40mg - helping. States still having depression, given medical issues and family issues. States no suicidal thoughts. Discussed counseling - declines. - States had right detached retina. States following with specialist. Using eye drops at moment. States s/p repair (2019) and cataract repair.     - Bone density scan showed osteoporosis of hip. Was on fosamax. States stopped - due to stomach pain. Has issues with reclast with stomach. Discussed other option in treatment - different bisphosphonate or prolia. Declines. - States has migraines. Up and down. Still asking about stadol. States has seen neurology - not improved - tried Topamax and Imitrex. Did not tolerateTopamax, Stopped amitriptyline. Taking butalbital/aspirin/caffeine and percocet. - States has asthma and stable with inhaler.  States SOB is better. - State pain to bilateral shoulders. [facility] had right shoulder injection per ortho (2018). [facility] had MRI on right, shows partial tears, fluid and degenerative changes. [facility]  had left shoulder injection (2019), [facility]     - [facility] has seen chronic pain management, not currently following. [facility] has had injections to right neck for right shoulder x 3 and [facility] had Radio frequency ablation x 1. [facility] has helped b/l shoulder pain. [facility] has had MRI on cervical spine through [place] - multiple osteophytes, disc disease and previous c5-6 fusion.    - [facility] still has lower back pain. Unchanged. No recent trauma or injury. No bowel or bladder incontinence. No fever or chills. No numbness, weakness. States pain as sharp shooting. No radicular pain. - labs from [place] (4/2020) - reviewed (6/2020)     Health Maintenance   - immunizations:   Influenza Vaccination  - (9/14/2015) , (2017), (2019), (2020)   Pneumonia Vaccination - (10/2016) - prevnar, (10/2017) - pneumococcal 23  Zoster/Shingles Vaccine - (2020) x 2   Tetanus Vaccination  - ([facility] - [facility]  covid (1/2021) #1 - [facility]     - Screenings:   Bone Density Test Screening - (10/25/2018)  Colonoscopy - (2014), (1/2020) - Minimal diverticulosis, sigmoid polyp x2 s/p polypectomy 3 to 4 mm, hemrrhoid, path - tubular and hyperplastic, follow-up in 2-3 years  Prostate     Couseled on Home Safety - (11/13/2017)    carotid ultrasound - (3/2015) - no significant stenosis    EGD - (1/2020) - Gastritis and gastric ulcer status post biopsy, duodenitis, Difficult to advance around the duodenal sweep due to apparent benign-appearing stricturing, poss due to ulcer - order upper GI , Upper GI confirm stricture, (6/20) duodenal stricture s/p dilation, refer to endosurger    ROS:  Review of Systems   Constitutional: Negative for appetite change, chills, fever and unexpected weight change. HENT: Negative for congestion, rhinorrhea and sore throat. Eyes: Positive for visual disturbance. Negative for pain. Respiratory: Negative for cough, chest tightness and shortness of breath. Cardiovascular: Negative for chest pain and palpitations. Gastrointestinal: Positive for abdominal pain. Negative for blood in stool, constipation, diarrhea, nausea and vomiting. Genitourinary: Negative for difficulty urinating, dysuria, hematuria, scrotal swelling, testicular pain and urgency. Musculoskeletal: Negative for arthralgias and gait problem. Skin: Negative for rash. Neurological: Positive for headaches. Negative for dizziness, syncope, weakness and light-headedness. Hematological: Negative for adenopathy. Does not bruise/bleed easily. Psychiatric/Behavioral: Negative for suicidal ideas. The patient is not nervous/anxious. Current Outpatient Medications:     amLODIPine (NORVASC) 10 MG tablet, Take 1 tablet by mouth every morning, Disp: 30 tablet, Rfl: 5    atorvastatin (LIPITOR) 10 MG tablet, Take 1 tablet by mouth daily, Disp: 30 tablet, Rfl: 5    FLUoxetine (PROZAC) 40 MG capsule, Take 1 capsule by mouth every morning, Disp: 30 capsule, Rfl: 5    omeprazole (PRILOSEC) 40 MG delayed release capsule, Take 1 capsule by mouth every morning (before breakfast), Disp: 30 capsule, Rfl: 5    MULTIPLE VITAMIN PO, daily Ld 6/15/2020, Disp: , Rfl:     butalbital-acetaminophen-caffeine (FIORICET, ESGIC) -40 MG per tablet, Take 1 tablet by mouth every 4 hours as needed for Headaches, Disp: , Rfl:     aspirin 81 MG tablet, Take 81 mg by mouth daily , Disp: , Rfl:     albuterol sulfate HFA (PROVENTIL HFA) 108 (90 Base) MCG/ACT inhaler, Inhale into the lungs every 4 hours as needed Instructed to take am of procedure if needed and bring dos, Disp: , Rfl:     oxyCODONE-acetaminophen (PERCOCET) 5-325 MG per tablet, Take 1 tablet by mouth 2 times daily.  Instructed to take am of procedure, Disp: , Rfl:     lisinopril (PRINIVIL;ZESTRIL) 20 MG tablet, Take 20 mg by mouth 2 times daily , Disp: , Rfl:     atenolol (TENORMIN) 100 MG tablet, Take 100 mg by mouth every morning , Disp: , Rfl:     niacin 500 MG extended release capsule, Take 500 mg by mouth nightly Ld 6/15/2020, Disp: , Rfl:     No Known Allergies    Past Medical History:   Diagnosis Date    Asthma     Depression     GERD (gastroesophageal reflux disease)     Hyperlipidemia     Hypertension     Migraine     Osteoporosis     Right retinal detachment 04/2019       Past Surgical History:   Procedure Laterality Date    CATARACT REMOVAL WITH IMPLANT Right     COLONOSCOPY N/A 1/14/2020    COLONOSCOPY WITH BIOPSY performed by Myra Frankel, MD at [facility] Right 4/25/2019    RIGHT EYE RETINAL DETACHMENT REPAIR, PARS PLANA VITRECTOMY 25 GAUGE, ENDO LASER GAS FLUID EXCHANGE (SF6)++LATEX ALLERGY++ performed by Darell Coelho MD at [place] [address] ENDOSCOPY N/A 1/14/2020    EGD BIOPSY performed by Myra Frankel, MD at [facility] N/A 6/18/2020    EGD DILATION BALLOON performed by Myra Frankel, MD at [facility] N/A 10/23/2020    ENDOSCOPIC EGD ULTRASOUND performed by Vishnu Delaney MD at [facility]  10/23/2020    EGD DILATION BALLOON performed by Vishnu Delaney MD at [address] [place] [address],[facility] Floor  10/23/2020    EGD BIOPSY performed by Vishnu Delaney MD at [address] [place] [address]         No family history on file.     Social History     Socioeconomic History    Marital status:      Spouse name: Not on file    Number of children: Not on file    Years of education: Not on file    Highest education level: Not on file   Occupational History    Not on file   Social Needs    Financial resource strain: Not on file    Food insecurity     Worry: Not on file     Inability: Not on file    Transportation needs     Medical: Not on file     Non-medical: Not on file   Tobacco Use    Smoking status: Never Smoker    Smokeless tobacco: Never Used   Substance and Sexual Activity    Alcohol use: Yes     Comment: occ     Drug use: No    Sexual activity: Not on file   Lifestyle    Physical activity     Days per week: Not on file     Minutes per session: Not on file    Stress: Not on file   Relationships    Social connections     Talks on phone: Not on file     Gets together: Not on file     Attends Sikhism service: Not on file     Active member of club or organization: Not on file     Attends meetings of clubs or organizations: Not on file     Relationship status: Not on file    Intimate partner violence     Fear of current or ex partner: Not on file     Emotionally abused: Not on file     Physically abused: Not on file     Forced sexual activity: Not on file   Other Topics Concern    Not on file   Social History Narrative    Not on file       Vitals:    02/04/21 1435   Weight: 220 lb (99.8 kg)   Height: 5' 9\" (1.753 m)       Exam:  Physical Exam  Constitutional:       Appearance: He is well-developed. HENT:      Head: Normocephalic and atraumatic. Right Ear: External ear normal.      Left Ear: External ear normal.   Eyes:      Pupils: Pupils are equal, round, and reactive to light. Neck:      Musculoskeletal: Normal range of motion and neck supple. Thyroid: No thyromegaly. Cardiovascular:      Rate and Rhythm: Normal rate and regular rhythm. Heart sounds: Normal heart sounds. No murmur. Pulmonary:      Effort: Pulmonary effort is normal.      Breath sounds: Normal breath sounds. No wheezing or rales. Abdominal:      General: Bowel sounds are normal.      Palpations: Abdomen is soft. Tenderness:  There is generalized abdominal tenderness. There is no guarding or rebound. Musculoskeletal:      Right shoulder: He exhibits decreased range of motion. Left shoulder: He exhibits decreased range of motion. Lumbar back: He exhibits decreased range of motion. Lymphadenopathy:      Cervical: No cervical adenopathy. Skin:     General: Skin is warm and dry. Neurological:      Mental Status: He is alert and oriented to person, place, and time. Cranial Nerves: No cranial nerve deficit. Psychiatric:         Judgment: Judgment normal.         Assessment and Plan:    Leila Wallis was seen today for eye problem and hypertension.     Diagnoses and all orders for this visit:    Gastritis without bleeding, unspecified chronicity, unspecified gastritis type  - on omeprazole   - following with surgery  - last EGD (6/2020) which showed healing    - improved with PPI     Duodenal stricture  - on omeprazole   - following with surgery  - EGD (6/2020) which showed healing   - had EGD with EUS (10/2020) s/p dilation   - follow up in 1 year     Essential hypertension  - monitor blood pressure at home  - watch diet  - on amlodipine   - on atenolol   - on lisinopril   - stable     Mixed hyperlipidemia  - watch diet  - on atrovastatin   - follow labs   - stable per labs     Dysthymia  - referral to counseling - declined  - did not tolerate zoloft  - off cymbalta  - back on prozac 40mg  - Stable     Stage 2 chronic kidney disease  - stopped meloxicam  - avoid NSAID  - recheck labs    Nontraumatic incomplete tear of right rotator cuff  - has been following with pain management - injections   - had abnormality of humerus on right, MRI 2018   - [facility]   - Stable     Cervical disc disease  - previous c5-6 surgery  - weakness of forearm  - Stable and unchanged     Chronic nonintractable headache, unspecified headache type  - continue present treatment  - not currently following with neurology  - did not tolerate Topamax  - off amitriptyline  - has asked for stadol - discussed not recommended  - on fiorcet     Chronic pain disorder  - no longer following with pain management - do not accept insurance  - has had injections - poss RFA  - has asked for stadol for migraine    Chronic bilateral low back pain without sciatica  - no longer following with pain management  - s/p RFA and injections - uncertain benefit     Other osteoporosis without current pathological fracture  - DEXA (11/2018)  - started fosamax - had to stop due to stomach pain, discussed trying another type, switch to prolia or trying reclast infusion.   - had reclast      Adenomatous polyp of colon, unspecified part of colon  - last colonoscopy (1/2020)   - follow up in 2-3 years      Return in about 6 months (around 8/4/2021) for check up and review and labs. No orders of the defined types were placed in this encounter.     Requested Prescriptions     Signed Prescriptions Disp Refills    amLODIPine (NORVASC) 10 MG tablet 30 tablet 5     Sig: Take 1 tablet by mouth every morning    atorvastatin (LIPITOR) 10 MG tablet 30 tablet 5     Sig: Take 1 tablet by mouth daily    FLUoxetine (PROZAC) 40 MG capsule 30 capsule 5     Sig: Take 1 capsule by mouth every morning     Alice Merida MD  2/4/2021  3:17 PM

## 2021-02-11 LAB
6-ACETYLMORPHINE, UR: NORMAL
ALBUMIN SERPL-MCNC: 4 G/DL
ALP BLD-CCNC: 63 U/L
ALT SERPL-CCNC: 42 U/L
AMPHETAMINE SCREEN, URINE: NEGATIVE
ANION GAP SERPL CALCULATED.3IONS-SCNC: 9 MMOL/L
AST SERPL-CCNC: 20 U/L
BARBITURATE SCREEN, URINE: NORMAL
BASOPHILS ABSOLUTE: 0.1 /ΜL
BASOPHILS RELATIVE PERCENT: 0.8 %
BENZODIAZEPINE SCREEN, URINE: NEGATIVE
BILIRUB SERPL-MCNC: 0.7 MG/DL (ref 0.1–1.4)
BILIRUBIN, URINE: ABNORMAL
BLOOD, URINE: ABNORMAL
BUN BLDV-MCNC: 24 MG/DL
CALCIUM SERPL-MCNC: 8.9 MG/DL
CANNABINOID SCREEN URINE: NEGATIVE
CHLORIDE BLD-SCNC: 110 MMOL/L
CHOLESTEROL, FASTING: 142
CLARITY: CLEAR
CO2: 27 MMOL/L
COCAINE METABOLITE, URINE: NEGATIVE
COLOR: ABNORMAL
CREAT SERPL-MCNC: 1.7 MG/DL
CREATININE URINE: NORMAL
EDDP, URINE: NORMAL
EOSINOPHILS ABSOLUTE: 0.2 /ΜL
EOSINOPHILS RELATIVE PERCENT: 2.3 %
ETHANOL URINE: NEGATIVE
GFR CALCULATED: 66
GLUCOSE BLD-MCNC: 96 MG/DL
GLUCOSE URINE: NEGATIVE
HCT VFR BLD CALC: 43.7 % (ref 41–53)
HDLC SERPL-MCNC: 35 MG/DL (ref 35–70)
HEMOGLOBIN: 15.1 G/DL (ref 13.5–17.5)
KETONES, URINE: ABNORMAL
LDL CHOLESTEROL CALCULATED: 99 MG/DL (ref 0–160)
LEUKOCYTE ESTERASE, URINE: NEGATIVE
LYMPHOCYTES ABSOLUTE: 1.7 /ΜL
LYMPHOCYTES RELATIVE PERCENT: 21.8 %
MCH RBC QN AUTO: 31.6 PG
MCHC RBC AUTO-ENTMCNC: 34.7 G/DL
MCV RBC AUTO: 91.2 FL
MDMA URINE: NORMAL
METHADONE SCREEN, URINE: NORMAL
METHAMPHETAMINE, URINE: NORMAL
MONOCYTES ABSOLUTE: 0.7 /ΜL
MONOCYTES RELATIVE PERCENT: 9 %
NEUTROPHILS ABSOLUTE: 5.2 /ΜL
NEUTROPHILS RELATIVE PERCENT: 66.1 %
NITRITE, URINE: NEGATIVE
OPIATES, URINE: NEGATIVE
OXYCODONE: NEGATIVE
PCP: NORMAL
PH UA: 5.5 (ref 4.5–8)
PH, URINE: NORMAL
PHENCYCLIDINE, URINE: NORMAL
PLATELET # BLD: 216 K/ΜL
PMV BLD AUTO: 8 FL
POTASSIUM SERPL-SCNC: 4.3 MMOL/L
PROPOXYPHENE, URINE: NORMAL
PROSTATE SPECIFIC ANTIGEN: 1 NG/ML
PROTEIN UA: POSITIVE
RBC # BLD: 4.79 10^6/ΜL
SODIUM BLD-SCNC: 142 MMOL/L
SPECIFIC GRAVITY, URINE: 1.03
TOTAL PROTEIN: 6.9
TRICYCLIC ANTIDEPRESSANTS, UR: NORMAL
TRIGLYCERIDE, FASTING: 131
UROBILINOGEN, URINE: ABNORMAL
WBC # BLD: 7.8 10^3/ML

## 2021-07-29 ENCOUNTER — OFFICE VISIT (OUTPATIENT)
Dept: FAMILY MEDICINE CLINIC | Age: 71
End: 2021-07-29
Payer: MEDICARE

## 2021-07-29 VITALS
DIASTOLIC BLOOD PRESSURE: 80 MMHG | BODY MASS INDEX: 32.78 KG/M2 | TEMPERATURE: 97.9 F | HEART RATE: 65 BPM | WEIGHT: 222 LBS | SYSTOLIC BLOOD PRESSURE: 122 MMHG | OXYGEN SATURATION: 95 %

## 2021-07-29 DIAGNOSIS — D12.6 ADENOMATOUS POLYP OF COLON, UNSPECIFIED PART OF COLON: ICD-10-CM

## 2021-07-29 DIAGNOSIS — R10.84 GENERALIZED ABDOMINAL PAIN: ICD-10-CM

## 2021-07-29 DIAGNOSIS — K29.70 GASTRITIS WITHOUT BLEEDING, UNSPECIFIED CHRONICITY, UNSPECIFIED GASTRITIS TYPE: ICD-10-CM

## 2021-07-29 DIAGNOSIS — M81.8 OTHER OSTEOPOROSIS WITHOUT CURRENT PATHOLOGICAL FRACTURE: ICD-10-CM

## 2021-07-29 DIAGNOSIS — G89.29 CHRONIC NONINTRACTABLE HEADACHE, UNSPECIFIED HEADACHE TYPE: ICD-10-CM

## 2021-07-29 DIAGNOSIS — M50.90 CERVICAL DISC DISEASE: ICD-10-CM

## 2021-07-29 DIAGNOSIS — G89.4 CHRONIC PAIN DISORDER: ICD-10-CM

## 2021-07-29 DIAGNOSIS — R51.9 CHRONIC NONINTRACTABLE HEADACHE, UNSPECIFIED HEADACHE TYPE: ICD-10-CM

## 2021-07-29 DIAGNOSIS — F34.1 DYSTHYMIA: ICD-10-CM

## 2021-07-29 DIAGNOSIS — E78.2 MIXED HYPERLIPIDEMIA: ICD-10-CM

## 2021-07-29 DIAGNOSIS — K31.5 DUODENAL STRICTURE: Primary | ICD-10-CM

## 2021-07-29 DIAGNOSIS — I10 ESSENTIAL HYPERTENSION: ICD-10-CM

## 2021-07-29 PROCEDURE — 3017F COLORECTAL CA SCREEN DOC REV: CPT | Performed by: INTERNAL MEDICINE

## 2021-07-29 PROCEDURE — G8417 CALC BMI ABV UP PARAM F/U: HCPCS | Performed by: INTERNAL MEDICINE

## 2021-07-29 PROCEDURE — 1123F ACP DISCUSS/DSCN MKR DOCD: CPT | Performed by: INTERNAL MEDICINE

## 2021-07-29 PROCEDURE — 1036F TOBACCO NON-USER: CPT | Performed by: INTERNAL MEDICINE

## 2021-07-29 PROCEDURE — G8427 DOCREV CUR MEDS BY ELIG CLIN: HCPCS | Performed by: INTERNAL MEDICINE

## 2021-07-29 PROCEDURE — 99213 OFFICE O/P EST LOW 20 MIN: CPT | Performed by: INTERNAL MEDICINE

## 2021-07-29 PROCEDURE — 4040F PNEUMOC VAC/ADMIN/RCVD: CPT | Performed by: INTERNAL MEDICINE

## 2021-07-29 RX ORDER — FLUOXETINE HYDROCHLORIDE 20 MG/1
20 CAPSULE ORAL DAILY
Qty: 30 CAPSULE | Refills: 5 | Status: SHIPPED
Start: 2021-07-29 | End: 2021-10-07

## 2021-07-29 RX ORDER — OMEPRAZOLE 40 MG/1
40 CAPSULE, DELAYED RELEASE ORAL
Qty: 30 CAPSULE | Refills: 5 | Status: SHIPPED
Start: 2021-07-29 | End: 2022-03-15 | Stop reason: SDUPTHER

## 2021-07-29 RX ORDER — FLUOXETINE HYDROCHLORIDE 40 MG/1
40 CAPSULE ORAL EVERY MORNING
Qty: 30 CAPSULE | Refills: 5 | Status: SHIPPED
Start: 2021-07-29 | End: 2022-02-08 | Stop reason: SDUPTHER

## 2021-07-29 RX ORDER — ATORVASTATIN CALCIUM 10 MG/1
10 TABLET, FILM COATED ORAL DAILY
Qty: 30 TABLET | Refills: 5 | Status: SHIPPED
Start: 2021-07-29 | End: 2022-02-08 | Stop reason: SDUPTHER

## 2021-07-29 RX ORDER — AMLODIPINE BESYLATE 10 MG/1
10 TABLET ORAL EVERY MORNING
Qty: 30 TABLET | Refills: 5 | Status: SHIPPED
Start: 2021-07-29 | End: 2022-02-08 | Stop reason: SDUPTHER

## 2021-07-29 SDOH — ECONOMIC STABILITY: FOOD INSECURITY: WITHIN THE PAST 12 MONTHS, YOU WORRIED THAT YOUR FOOD WOULD RUN OUT BEFORE YOU GOT MONEY TO BUY MORE.: NEVER TRUE

## 2021-07-29 SDOH — ECONOMIC STABILITY: FOOD INSECURITY: WITHIN THE PAST 12 MONTHS, THE FOOD YOU BOUGHT JUST DIDN'T LAST AND YOU DIDN'T HAVE MONEY TO GET MORE.: NEVER TRUE

## 2021-07-29 ASSESSMENT — ENCOUNTER SYMPTOMS
CHEST TIGHTNESS: 0
BLOOD IN STOOL: 0
SORE THROAT: 0
SHORTNESS OF BREATH: 0
RHINORRHEA: 0
DIARRHEA: 0
ABDOMINAL PAIN: 1
EYE PAIN: 0
NAUSEA: 0
VOMITING: 0
CONSTIPATION: 0
COUGH: 0

## 2021-07-29 ASSESSMENT — SOCIAL DETERMINANTS OF HEALTH (SDOH): HOW HARD IS IT FOR YOU TO PAY FOR THE VERY BASICS LIKE FOOD, HOUSING, MEDICAL CARE, AND HEATING?: NOT HARD AT ALL

## 2021-07-29 NOTE — PROGRESS NOTES
[facility]) Physicians   Internal Medicine     2021  Divya Renteria : 1950 Sex: male  Age:71 y.o. Chief Complaint   Patient presents with    Hypertension    Hyperlipidemia    Anxiety        HPI:   Patient presents to office for evaluation of the following medical concerns. - States having generalized abdominal pain, has improved and stable. Found duodenal stricture. States has had EGD and colonoscopy (2020)- showed gastritis and duodenal stricture, had follow up EGD and had dilation (2020)  - referred to endo surgery. Had EGD with EUS (10/20) - Duodenal stricture just distal to the duodenal bulb path - blood with few benign glandular cells. States some left sided soreness. On omeprazole. No nausea, no vomiting. No diarrhea or constipation. No melena or hematochezia. For follow up in -10/2021.     - States has hypertension. States does check blood pressure at home, blood pressure 120-130's/70-80. Occasional in 140's. On amlodipine, lisinopril and atenolol. No reported side effects.     - Has high cholesterol. Trying to watch diet. On atorvastatin. No reported side effects.     - States has depression. On Prozac 40mg - helping. States still having depression, given medical issues and family issues. States no suicidal thoughts. Discussed counseling - declines. - States had right detached retina. States following with specialist. Using eye drops at moment. States s/p repair (2019) and cataract repair.     - Bone density scan showed osteoporosis of hip. Was on fosamax. States stopped - due to stomach pain. Has issues with reclast with stomach. Discussed other option in treatment - different bisphosphonate or prolia. Declines. - States has migraines. Up and down. Still asking about stadol. States has seen neurology - not improved - tried Topamax and Imitrex. Did not tolerateTopamax, Stopped amitriptyline.  Taking butalbital/aspirin/caffeine.    - States has asthma and stable with inhaler. States SOB is better. - State pain to bilateral shoulders. [facility] had right shoulder injection per ortho (2018). [facility] had MRI on right, shows partial tears, fluid and degenerative changes. [facility]    - [facility] has seen chronic pain management, not currently following. [facility] has had injections to right neck for right shoulder x 3 and [facility] had Radio frequency ablation x 1. [facility] has helped b/l shoulder pain. [facility] has had MRI on cervical spine through [address] - multiple osteophytes, disc disease and previous c5-6 fusion.    - [facility] still has lower back pain. Unchanged. No recent trauma or injury. No bowel or bladder incontinence. No fever or chills. No numbness, weakness. States pain as sharp shooting. No radicular pain.     - [facility] (2/[facility]21[facility] reviewed with patient -CBC nonspecific, CMP elevated chloride BUN otherwise negative, cholesterol fair LDL 99 total 142 HDL 35 PSA 1.0 UA negative urine drug screen positive for barbiturates    Health Maintenance   - immunizations:   Influenza Vaccination  - (9/14/2015) , (2017), (2019), (2020)   Pneumonia Vaccination - (10/2016) - prevnar, (10/2017) - pneumococcal 23  Zoster/Shingles Vaccine - (2020) x 2   Tetanus Vaccination  - (2015) - [facility]  covid (1/2021) #1, (2/21/2021) #2 - [facility] pfizer     - Screenings:   Bone Density Test Screening - (10/25/2018)  Colonoscopy - (2014), (1/2020) - Minimal diverticulosis, sigmoid polyp x2 s/p polypectomy 3 to 4 mm, hemrrhoid, path - tubular and hyperplastic, follow-up in 2-3 years  Prostate     Couseled on Home Safety - (11/13/2017)    carotid ultrasound - (3/2015) - no significant stenosis    EGD - (1/2020) - Gastritis and gastric ulcer status post biopsy, duodenitis, Difficult to advance around the duodenal sweep due to apparent benign-appearing stricturing, poss due to ulcer - order upper GI , Upper GI confirm stricture, (6/20) duodenal stricture s/p dilation, refer to endosurger    ROS:  Review of Systems   Constitutional: Negative for appetite change, chills, fever and unexpected weight change. HENT: Negative for congestion, rhinorrhea and sore throat. Eyes: Positive for visual disturbance. Negative for pain. Respiratory: Negative for cough, chest tightness and shortness of breath. Cardiovascular: Negative for chest pain and palpitations. Gastrointestinal: Positive for abdominal pain. Negative for blood in stool, constipation, diarrhea, nausea and vomiting. Genitourinary: Negative for difficulty urinating, dysuria, hematuria, scrotal swelling, testicular pain and urgency. Musculoskeletal: Negative for arthralgias and gait problem. Skin: Negative for rash. Neurological: Positive for headaches. Negative for dizziness, syncope, weakness and light-headedness. Hematological: Negative for adenopathy. Does not bruise/bleed easily. Psychiatric/Behavioral: Negative for suicidal ideas. The patient is not nervous/anxious.           Current Outpatient Medications:     FLUoxetine (PROZAC) 40 MG capsule, Take 1 capsule by mouth every morning, Disp: 30 capsule, Rfl: 5    atorvastatin (LIPITOR) 10 MG tablet, Take 1 tablet by mouth daily, Disp: 30 tablet, Rfl: 5    amLODIPine (NORVASC) 10 MG tablet, Take 1 tablet by mouth every morning, Disp: 30 tablet, Rfl: 5    omeprazole (PRILOSEC) 40 MG delayed release capsule, Take 1 capsule by mouth every morning (before breakfast), Disp: 30 capsule, Rfl: 5    FLUoxetine (PROZAC) 20 MG capsule, Take 1 capsule by mouth daily, Disp: 30 capsule, Rfl: 5    MULTIPLE VITAMIN PO, daily Ld 6/15/2020, Disp: , Rfl:     butalbital-acetaminophen-caffeine (FIORICET, ESGIC) -40 MG per tablet, Take 1 tablet by mouth every 4 hours as needed for Headaches, Disp: , Rfl:     niacin 500 MG extended release capsule, Take 500 mg by mouth nightly Ld 6/15/2020, Disp: , Rfl:     aspirin 81 MG tablet, Take 81 mg by mouth daily , Disp: , Rfl: EXTRACTION         No family history on file. Social History     Socioeconomic History    Marital status:      Spouse name: Not on file    Number of children: Not on file    Years of education: Not on file    Highest education level: Not on file   Occupational History    Not on file   Tobacco Use    Smoking status: Never Smoker    Smokeless tobacco: Never Used   Vaping Use    Vaping Use: Never used   Substance and Sexual Activity    Alcohol use: Yes     Comment: occ     Drug use: No    Sexual activity: Not on file   Other Topics Concern    Not on file   Social History Narrative    Not on file     Social Determinants of Health     Financial Resource Strain: Low Risk     Difficulty of Paying Living Expenses: Not hard at all   Food Insecurity: No Food Insecurity    Worried About [address] [employer] in the Last Year: Never true    [address] in the Last Year: Never true   Transportation Needs:     Lack of Transportation (Medical):  Lack of Transportation (Non-Medical):    Physical Activity:     Days of Exercise per Week:     Minutes of Exercise per Session:    Stress:     Feeling of Stress :    Social Connections:     Frequency of Communication with Friends and Family:     Frequency of Social Gatherings with Friends and Family:     Attends Jainism Services:     Active Member of Clubs or Organizations:     Attends Club or Organization Meetings:     Marital Status:    Intimate Partner Violence:     Fear of Current or Ex-Partner:     Emotionally Abused:     Physically Abused:     Sexually Abused:        Vitals:    07/29/21 1032   BP: 122/80   Pulse: 65   Temp: 97.9 °F (36.6 °C)   TempSrc: Temporal   SpO2: 95%   Weight: 222 lb (100.7 kg)       Exam:  Physical Exam  Constitutional:       Appearance: He is well-developed. HENT:      Head: Normocephalic and atraumatic.       Right Ear: External ear normal.      Left Ear: External ear normal.   Eyes:      Pupils: Pupils are equal, round, and reactive to light. Neck:      Thyroid: No thyromegaly. Cardiovascular:      Rate and Rhythm: Normal rate and regular rhythm. Heart sounds: Normal heart sounds. No murmur heard. Pulmonary:      Effort: Pulmonary effort is normal.      Breath sounds: Normal breath sounds. No wheezing or rales. Abdominal:      General: Bowel sounds are normal.      Palpations: Abdomen is soft. Tenderness: There is generalized abdominal tenderness. There is no guarding or rebound. Musculoskeletal:      Right shoulder: Decreased range of motion. Left shoulder: Decreased range of motion. Cervical back: Normal range of motion and neck supple. Lumbar back: Decreased range of motion. Lymphadenopathy:      Cervical: No cervical adenopathy. Skin:     General: Skin is warm and dry. Neurological:      Mental Status: He is alert and oriented to person, place, and time. Cranial Nerves: No cranial nerve deficit.    Psychiatric:         Judgment: Judgment normal.         Assessment and Plan:    Diagnoses and all orders for this visit:    Gastritis without bleeding, unspecified chronicity, unspecified gastritis type  - on omeprazole   - following with surgery  - last EGD (6/2020) which showed healing    - improved with PPI     Duodenal stricture  - on omeprazole   - following with surgery  - EGD (6/2020) which showed healing   - had EGD with EUS (10/2020) s/p dilation   - follow up in 1 year     Essential hypertension  - monitor blood pressure at home  - watch diet  - on amlodipine   - on atenolol   - on lisinopril   - stable     Mixed hyperlipidemia  - watch diet  - on atrovastatin   - follow labs   - stable per labs   - lst lab (2/2021)     Dysthymia  - referral to counseling - declined  - did not tolerate zoloft  - off cymbalta  - on prozac 40mg  - increase to 60mg     Stage 2 chronic kidney disease  - stopped meloxicam  - avoid NSAID  - recheck labs    Nontraumatic incomplete tear of right rotator cuff  - has been following with pain management - injections   - had abnormality of humerus on right, MRI 2018   - [facility]   - Stable     Cervical disc disease  - previous c5-6 surgery  - weakness of forearm  - Stable and unchanged     Chronic nonintractable headache, unspecified headache type  - continue present treatment  - not currently following with neurology  - did not tolerate Topamax  - off amitriptyline  - has asked for stadol - discussed not recommended  - on fiorcet     Chronic pain disorder  - no longer following with pain management - do not accept insurance  - has had injections - poss RFA  - has asked for stadol for migraine    Chronic bilateral low back pain without sciatica  - no longer following with pain management  - s/p RFA and injections - uncertain benefit     Other osteoporosis without current pathological fracture  - DEXA (11/2018)  - started fosamax - had to stop due to stomach pain, discussed trying another type, switch to prolia or trying reclast infusion.   - had reclast      Adenomatous polyp of colon, unspecified part of colon  - last colonoscopy (1/2020)   - follow up in 2-3 years      Return in about 6 months (around 1/29/2022) for check up and review. No orders of the defined types were placed in this encounter.     Requested Prescriptions     Signed Prescriptions Disp Refills    FLUoxetine (PROZAC) 40 MG capsule 30 capsule 5     Sig: Take 1 capsule by mouth every morning    atorvastatin (LIPITOR) 10 MG tablet 30 tablet 5     Sig: Take 1 tablet by mouth daily    amLODIPine (NORVASC) 10 MG tablet 30 tablet 5     Sig: Take 1 tablet by mouth every morning    omeprazole (PRILOSEC) 40 MG delayed release capsule 30 capsule 5     Sig: Take 1 capsule by mouth every morning (before breakfast)    FLUoxetine (PROZAC) 20 MG capsule 30 capsule 5     Sig: Take 1 capsule by mouth daily     Bryson De MD  7/29/2021  11:15 AM

## 2021-08-19 LAB
6-ACETYLMORPHINE, UR: NORMAL
ALBUMIN SERPL-MCNC: 3.9 G/DL
ALP BLD-CCNC: 64 U/L
ALT SERPL-CCNC: 40 U/L
AMPHETAMINE SCREEN, URINE: NORMAL
ANION GAP SERPL CALCULATED.3IONS-SCNC: NORMAL MMOL/L
AST SERPL-CCNC: 25 U/L
BARBITURATE SCREEN, URINE: NORMAL
BASOPHILS ABSOLUTE: 0.2 /ΜL
BASOPHILS RELATIVE PERCENT: 1.9 %
BENZODIAZEPINE SCREEN, URINE: NORMAL
BILIRUB SERPL-MCNC: 0.7 MG/DL (ref 0.1–1.4)
BUN BLDV-MCNC: 17 MG/DL
CALCIUM SERPL-MCNC: 9.6 MG/DL
CANNABINOID SCREEN URINE: NORMAL
CHLORIDE BLD-SCNC: 108 MMOL/L
CO2: 24 MMOL/L
COCAINE METABOLITE, URINE: NORMAL
CREAT SERPL-MCNC: 1.3 MG/DL
CREATININE URINE: NORMAL
EDDP, URINE: NORMAL
EOSINOPHILS ABSOLUTE: 0.2 /ΜL
EOSINOPHILS RELATIVE PERCENT: 2.4 %
ETHANOL URINE: NORMAL
GFR CALCULATED: 55
GLUCOSE BLD-MCNC: 96 MG/DL
HCT VFR BLD CALC: 42.6 % (ref 41–53)
HEMOGLOBIN: 15.3 G/DL (ref 13.5–17.5)
LYMPHOCYTES ABSOLUTE: 1.7 /ΜL
LYMPHOCYTES RELATIVE PERCENT: 19.5 %
MCH RBC QN AUTO: 32.7 PG
MCHC RBC AUTO-ENTMCNC: 35.8 G/DL
MCV RBC AUTO: 91.2 FL
MDMA URINE: NORMAL
METHADONE SCREEN, URINE: NORMAL
METHAMPHETAMINE, URINE: NORMAL
MONOCYTES ABSOLUTE: 0.6 /ΜL
MONOCYTES RELATIVE PERCENT: 7.5 %
NEUTROPHILS ABSOLUTE: 5.9 /ΜL
NEUTROPHILS RELATIVE PERCENT: 68.7 %
OPIATES, URINE: NORMAL
OXYCODONE: NORMAL
PCP: NORMAL
PDW BLD-RTO: 13 %
PH, URINE: NORMAL
PHENCYCLIDINE, URINE: NORMAL
PLATELET # BLD: 229 K/ΜL
PMV BLD AUTO: 7.3 FL
POTASSIUM SERPL-SCNC: 4.3 MMOL/L
PROPOXYPHENE, URINE: NORMAL
RBC # BLD: 4.67 10^6/ΜL
SODIUM BLD-SCNC: 142 MMOL/L
TOTAL PROTEIN: 6.3
TRICYCLIC ANTIDEPRESSANTS, UR: NORMAL
WBC # BLD: NORMAL 10*3/UL

## 2021-10-04 ENCOUNTER — TELEPHONE (OUTPATIENT)
Dept: FAMILY MEDICINE CLINIC | Age: 71
End: 2021-10-04

## 2021-10-04 DIAGNOSIS — F41.9 ANXIETY: Primary | ICD-10-CM

## 2021-10-04 RX ORDER — ALPRAZOLAM 0.25 MG/1
0.25 TABLET ORAL DAILY PRN
Qty: 10 TABLET | Refills: 0 | OUTPATIENT
Start: 2021-10-04 | End: 2021-10-14

## 2021-10-04 NOTE — TELEPHONE ENCOUNTER
----- Message from Wai Acosta sent at 10/1/2021  6:56 PM EDT -----  Subject: Message to Provider    QUESTIONS  Information for Provider? Pt had to put his dog to sleep and is very   upset. Would like pcp to prescribe something to calm anxiety.  ---------------------------------------------------------------------------  --------------  CALL BACK INFO  What is the best way for the office to contact you? OK to leave message on   voicemail  Preferred Call Back Phone Number? [phone]  ---------------------------------------------------------------------------  --------------  SCRIPT ANSWERS  Relationship to Patient? Other  Representative Name? Mau Lopez  Is the Representative on the appropriate HIPAA document in Epic?  Yes

## 2021-10-04 NOTE — TELEPHONE ENCOUNTER
Spoke w/ pt and was able to gather a little more information. He is on Prozac 40mg but was not able to tolerate the additional 20mg that you had prescribed. States that he tried it for awhile but it caused diarrhea so he stopped it. He had to take Xanax years ago for a few weeks when another dog . States that he would like something to get him through. Dogs passing was unexpected (he had him for 13 tears). States that he also has increased stress and family issues currently. He is taking Percocet that the [place] prescribes. If you are able to prescribe he would like the medication sent to [facility].

## 2021-10-07 ENCOUNTER — OFFICE VISIT (OUTPATIENT)
Dept: FAMILY MEDICINE CLINIC | Age: 71
End: 2021-10-07
Payer: MEDICARE

## 2021-10-07 ENCOUNTER — TELEPHONE (OUTPATIENT)
Dept: FAMILY MEDICINE CLINIC | Age: 71
End: 2021-10-07

## 2021-10-07 VITALS
SYSTOLIC BLOOD PRESSURE: 128 MMHG | DIASTOLIC BLOOD PRESSURE: 78 MMHG | HEART RATE: 82 BPM | WEIGHT: 219 LBS | OXYGEN SATURATION: 97 % | TEMPERATURE: 97.6 F | BODY MASS INDEX: 32.34 KG/M2

## 2021-10-07 DIAGNOSIS — J32.9 SINOBRONCHITIS: Primary | ICD-10-CM

## 2021-10-07 DIAGNOSIS — Z20.822 SUSPECTED COVID-19 VIRUS INFECTION: ICD-10-CM

## 2021-10-07 DIAGNOSIS — J40 SINOBRONCHITIS: Primary | ICD-10-CM

## 2021-10-07 DIAGNOSIS — J45.21 MILD INTERMITTENT ASTHMA WITH EXACERBATION: ICD-10-CM

## 2021-10-07 PROCEDURE — 3017F COLORECTAL CA SCREEN DOC REV: CPT | Performed by: NURSE PRACTITIONER

## 2021-10-07 PROCEDURE — 1036F TOBACCO NON-USER: CPT | Performed by: NURSE PRACTITIONER

## 2021-10-07 PROCEDURE — G8417 CALC BMI ABV UP PARAM F/U: HCPCS | Performed by: NURSE PRACTITIONER

## 2021-10-07 PROCEDURE — 99213 OFFICE O/P EST LOW 20 MIN: CPT | Performed by: NURSE PRACTITIONER

## 2021-10-07 PROCEDURE — 4040F PNEUMOC VAC/ADMIN/RCVD: CPT | Performed by: NURSE PRACTITIONER

## 2021-10-07 PROCEDURE — G8427 DOCREV CUR MEDS BY ELIG CLIN: HCPCS | Performed by: NURSE PRACTITIONER

## 2021-10-07 PROCEDURE — G8484 FLU IMMUNIZE NO ADMIN: HCPCS | Performed by: NURSE PRACTITIONER

## 2021-10-07 PROCEDURE — 1123F ACP DISCUSS/DSCN MKR DOCD: CPT | Performed by: NURSE PRACTITIONER

## 2021-10-07 PROCEDURE — 96372 THER/PROPH/DIAG INJ SC/IM: CPT | Performed by: NURSE PRACTITIONER

## 2021-10-07 RX ORDER — METHYLPREDNISOLONE ACETATE 40 MG/ML
40 INJECTION, SUSPENSION INTRA-ARTICULAR; INTRALESIONAL; INTRAMUSCULAR; SOFT TISSUE ONCE
Status: COMPLETED | OUTPATIENT
Start: 2021-10-07 | End: 2021-10-07

## 2021-10-07 RX ORDER — DOXYCYCLINE HYCLATE 100 MG/1
100 CAPSULE ORAL 2 TIMES DAILY
Qty: 20 CAPSULE | Refills: 0 | Status: SHIPPED | OUTPATIENT
Start: 2021-10-07 | End: 2021-10-17

## 2021-10-07 RX ORDER — PREDNISONE 20 MG/1
20 TABLET ORAL 3 TIMES DAILY
Qty: 15 TABLET | Refills: 0 | Status: SHIPPED | OUTPATIENT
Start: 2021-10-07 | End: 2021-10-12

## 2021-10-07 RX ORDER — BENZONATATE 100 MG/1
CAPSULE ORAL
Qty: 30 CAPSULE | Refills: 0 | Status: SHIPPED
Start: 2021-10-07 | End: 2022-03-15

## 2021-10-07 RX ADMIN — METHYLPREDNISOLONE ACETATE 40 MG: 40 INJECTION, SUSPENSION INTRA-ARTICULAR; INTRALESIONAL; INTRAMUSCULAR; SOFT TISSUE at 12:56

## 2021-10-07 NOTE — TELEPHONE ENCOUNTER
Bonny Yarbrough - Wife  She is calling to ask if you will give him a note excusing him from [facility]? He will need this before the 20th of October.

## 2021-10-07 NOTE — PATIENT INSTRUCTIONS
Patient Education        Asthma Attack: Care Instructions  Overview     During an asthma attack, the airways swell and narrow. This makes it hard to breathe. Severe asthma attacks can be dangerous. But you can help prevent these attacks by keeping your asthma under control and treating symptoms before they get bad. Symptoms include being short of breath, having chest tightness, coughing, and wheezing. Noting and treating these symptoms can also help you avoid trips to the emergency room. If you notice any problems or new symptoms, get medical treatment right away. Follow-up care is a key part of your treatment and safety. Be sure to make and go to all appointments, and call your doctor if you are having problems. It's also a good idea to know your test results and keep a list of the medicines you take. How can you care for yourself at home? · Follow your asthma action plan to prevent and treat attacks. If you don't have an asthma action plan, work with your doctor to create one. · Take your asthma medicines exactly as prescribed. Talk to your doctor right away if you have any questions about how to take them. ? Use your quick-relief medicine when you have symptoms of an asthma attack. Some people need to use quick-relief medicine before they exercise to prevent asthma symptoms. Albuterol is a quick-relief medicine that is often used. In some cases, a certain type of controller inhaler is used as a quick-relief medicine. Ask your doctor what to use for quick relief. ? Take your controller medicine. If you have symptoms often, you will likely need to take it every day. Controller medicine usually includes an inhaled corticosteroid. The goal is to prevent problems before they occur. ? If your doctor prescribed corticosteroid pills to use during an attack, take them exactly as prescribed. It may take hours for the pills to work, but they may make the episode shorter and help you breathe better. ?  Keep your quick-relief medicine with you at all times. · Talk to your doctor before using other medicines. Some medicines, such as aspirin, can cause asthma attacks in some people. · If you have a peak flow meter, use it to check how well you are breathing. This can help you predict when an asthma attack is going to occur. Then you can take medicine to prevent the asthma attack or make it less severe. · Do not smoke or allow others to smoke around you. Avoid smoky places. Smoking makes asthma worse. If you need help quitting, talk to your doctor about stop-smoking programs and medicines. These can increase your chances of quitting for good. · Learn what triggers an asthma attack for you, and avoid the triggers when you can. Common triggers include colds, smoke, air pollution, dust, pollen, mold, pets, cockroaches, stress, and cold air. · Avoid colds and the flu. Talk to your doctor about getting a pneumococcal vaccine shot. If you have had one before, ask your doctor if you need a second dose. Get a flu vaccine every fall. If you must be around people with colds or the flu, wash your hands often. When should you call for help? Call 911 anytime you think you may need emergency care. For example, call if:    · You have severe trouble breathing. Call your doctor now or seek immediate medical care if:    · Your symptoms do not get better after you have followed your asthma action plan.     · You have new or worse trouble breathing.     · Your coughing and wheezing get worse.     · You cough up dark brown or bloody mucus (sputum).     · You have a new or higher fever.    Watch closely for changes in your health, and be sure to contact your doctor if:    · You need to use quick-relief medicine on more than 2 days a week within a month (unless it is just for exercise).     · You cough more deeply or more often, especially if you notice more mucus or a change in the color of your mucus.     · You are not getting better as expected. Where can you learn more? Go to https://chpepiceweb.[employer]. org and sign in to your [employer] account. Enter R071 in the [employer] medical[employer] box to learn more about \"Asthma Attack: Care Instructions. \"     If you do not have an account, please click on the \"Sign Up Now\" link. Current as of: July 6, 2021               Content Version: 13.0  © 2006-2021 [employer]. Care instructions adapted under license by [facility] ([facility]). If you have questions about a medical condition or this instruction, always ask your healthcare professional. [place] [address] any warranty or liability for your use of this information. Patient Education        Bronchitis: Care Instructions  Your Care Instructions     Bronchitis is inflammation of the bronchial tubes, which carry air to the lungs. The tubes swell and produce mucus, or phlegm. The mucus and inflamed bronchial tubes make you cough. You may have trouble breathing. Most cases of bronchitis are caused by viruses like those that cause colds. Antibiotics usually do not help and they may be harmful. Bronchitis usually develops rapidly and lasts about 2 to 3 weeks in otherwise healthy people. Follow-up care is a key part of your treatment and safety. Be sure to make and go to all appointments, and call your doctor if you are having problems. It's also a good idea to know your test results and keep a list of the medicines you take. How can you care for yourself at home? · Take all medicines exactly as prescribed. Call your doctor if you think you are having a problem with your medicine. · Get some extra rest.  · Take an over-the-counter pain medicine, such as acetaminophen (Tylenol), ibuprofen (Advil, Motrin), or naproxen (Aleve) to reduce fever and relieve body aches. Read and follow all instructions on the label. · Do not take two or more pain medicines at the same time unless the doctor told you to.  Many pain medicines have acetaminophen, which is Tylenol. Too much acetaminophen (Tylenol) can be harmful. · Take an over-the-counter cough medicine to help quiet a dry, hacking cough so that you can sleep. Avoid cough medicines that have more than one active ingredient. Read and follow all instructions on the label. · Do not smoke. Smoking can make bronchitis worse. If you need help quitting, talk to your doctor about stop-smoking programs and medicines. These can increase your chances of quitting for good. When should you call for help? Call 911 anytime you think you may need emergency care. For example, call if:    · You have severe trouble breathing. Call your doctor now or seek immediate medical care if:    · You have new or worse trouble breathing.     · You cough up dark brown or bloody mucus (sputum).     · You have a new or higher fever.     · You have a new rash. Watch closely for changes in your health, and be sure to contact your doctor if:    · You cough more deeply or more often, especially if you notice more mucus or a change in the color of your mucus.     · You are not getting better as expected. Where can you learn more? Go to https://Silverpop.[employer]. org and sign in to your for; to (do) Centers account. Enter H333 in the [employer] box to learn more about \"Bronchitis: Care Instructions. \"     If you do not have an account, please click on the \"Sign Up Now\" link. Current as of: July 6, 2021               Content Version: 13.0  © 6449-2615 Healthwise, [facility]. Care instructions adapted under license by [facility] ([facility]). If you have questions about a medical condition or this instruction, always ask your healthcare professional. [place] [address] any warranty or liability for your use of this information.

## 2021-10-07 NOTE — PROGRESS NOTES
10/7/21  Gema Greenfield : 1950 Sex: male  Age 80 y.o. Subjective:  Chief Complaint   Patient presents with    Cough    Congestion    Wheezing       HPI:   Gema Greenfield , 80 y.o. male presents to the clinic for evaluation of sinus congestion x 7 days. The patient also reports mild productive (green), [place], and wheezing. The patient has taken Nyquil for symptoms. The patient reports unchanged symptoms over time. The patient reports ill exposure. The patient denies hx of COVID-19 and reports having the vaccines. The patient denies acute loss of taste and smell, headache, sore throat, rash, and fever. The patient also denies chest pain, abdominal pain, and nausea / vomiting / diarrhea. ROS:   Unless otherwise stated in this report the patient's positive and negative responses for review of systems for constitutional, eyes, ENT, cardiovascular, respiratory, gastrointestinal, neurological, , musculoskeletal, and integument systems and related systems to the presenting problem are either stated in the history of present illness or were not pertinent or were negative for the symptoms and/or complaints related to the presenting medical problem. Positives and pertinent negatives as per HPI. All others reviewed and are negative.       PMH:     Past Medical History:   Diagnosis Date    Asthma     Depression     GERD (gastroesophageal reflux disease)     Hyperlipidemia     Hypertension     Migraine     Osteoporosis     Right retinal detachment 2019       Past Surgical History:   Procedure Laterality Date    CATARACT REMOVAL WITH IMPLANT Right     COLONOSCOPY N/A 2020    COLONOSCOPY WITH BIOPSY performed by Kurtis Mead MD at [facility]    RETINAL DETACHMENT SURGERY Right 2019    RIGHT EYE RETINAL DETACHMENT REPAIR, PARS PLANA VITRECTOMY 25 GAUGE, ENDO LASER GAS FLUID EXCHANGE (SF6)++LATEX ALLERGY++ performed by India Lama MD at [address] [place] [address]      UPPER GASTROINTESTINAL ENDOSCOPY N/A 1/14/2020    EGD BIOPSY performed by Liliana Corbett MD at [facility] N/A 6/18/2020    EGD DILATION BALLOON performed by Liliana Corbett MD at [facility] 10/23/2020    ENDOSCOPIC EGD ULTRASOUND performed by Gerardo Simons MD at [facility]  10/23/2020    EGD DILATION BALLOON performed by Gerardo Simons MD at [facility]  10/23/2020    EGD BIOPSY performed by Gerardo Simons MD at [address] [place] [address]         History reviewed. No pertinent family history. Medications:     Current Outpatient Medications:     doxycycline hyclate (VIBRAMYCIN) 100 MG capsule, Take 1 capsule by mouth 2 times daily for 10 days, Disp: 20 capsule, Rfl: 0    predniSONE (DELTASONE) 20 MG tablet, Take 1 tablet by mouth 3 times daily for 5 days, Disp: 15 tablet, Rfl: 0    benzonatate (TESSALON PERLES) 100 MG capsule, 1-2 tablets every 8 hours as needed for cough, Disp: 30 capsule, Rfl: 0    ALPRAZolam (XANAX) 0.25 MG tablet, Take 1 tablet by mouth daily as needed for Anxiety for up to 10 days. , Disp: 10 tablet, Rfl: 0    FLUoxetine (PROZAC) 40 MG capsule, Take 1 capsule by mouth every morning, Disp: 30 capsule, Rfl: 5    atorvastatin (LIPITOR) 10 MG tablet, Take 1 tablet by mouth daily, Disp: 30 tablet, Rfl: 5    amLODIPine (NORVASC) 10 MG tablet, Take 1 tablet by mouth every morning, Disp: 30 tablet, Rfl: 5    omeprazole (PRILOSEC) 40 MG delayed release capsule, Take 1 capsule by mouth every morning (before breakfast), Disp: 30 capsule, Rfl: 5    MULTIPLE VITAMIN PO, daily Ld 6/15/2020, Disp: , Rfl:     butalbital-acetaminophen-caffeine (FIORICET, ESGIC) -40 MG per tablet, Take 1 tablet by mouth every 4 hours as needed for Headaches, Disp: , Rfl:     niacin 500 MG extended release capsule, Take 500 mg by mouth nightly Ld 6/15/2020, Disp: , Rfl:     aspirin 81 MG tablet, Take 81 mg by mouth daily , Disp: , Rfl:     albuterol sulfate HFA (PROVENTIL HFA) 108 (90 Base) MCG/ACT inhaler, Inhale into the lungs every 4 hours as needed Instructed to take am of procedure if needed and bring dos, Disp: , Rfl:     oxyCODONE-acetaminophen (PERCOCET) 5-325 MG per tablet, Take 1 tablet by mouth 2 times daily. Instructed to take am of procedure, Disp: , Rfl:     lisinopril (PRINIVIL;ZESTRIL) 20 MG tablet, Take 20 mg by mouth 2 times daily , Disp: , Rfl:     atenolol (TENORMIN) 100 MG tablet, Take 100 mg by mouth every morning , Disp: , Rfl:     Allergies:   No Known Allergies    Social History:     Social History     Tobacco Use    Smoking status: Never Smoker    Smokeless tobacco: Never Used   Vaping Use    Vaping Use: Never used   Substance Use Topics    Alcohol use: Yes     Comment: occ     Drug use: No       Patient lives at home. Physical Exam:     Vitals:    10/07/21 1223   BP: 128/78   Pulse: 82   Temp: 97.6 °F (36.4 °C)   TempSrc: Temporal   SpO2: 97%   Weight: 219 lb (99.3 kg)       Physical Exam (PE)    Physical Exam  Constitutional:       Appearance: Normal appearance. HENT:      Head: Normocephalic. Right Ear: Tympanic membrane, ear canal and external ear normal.      Left Ear: Tympanic membrane, ear canal and external ear normal.      Nose: Congestion and rhinorrhea present. Mouth/Throat:      Mouth: Mucous membranes are moist.      Pharynx: Oropharynx is clear. Eyes:      Pupils: Pupils are equal, round, and reactive to light. Cardiovascular:      Rate and Rhythm: Normal rate and regular rhythm. Pulses: Normal pulses. Heart sounds: Normal heart sounds. Pulmonary:      Effort: Pulmonary effort is normal. No respiratory distress. Breath sounds: Wheezing present.  No rhonchi or rales. Abdominal:      General: Bowel sounds are normal.      Palpations: Abdomen is soft. Musculoskeletal:         General: Normal range of motion. Cervical back: Normal range of motion and neck supple. Lymphadenopathy:      Cervical: No cervical adenopathy. Skin:     General: Skin is warm and dry. Capillary Refill: Capillary refill takes less than 2 seconds. Neurological:      General: No focal deficit present. Mental Status: He is alert and oriented to person, place, and time. Psychiatric:         Mood and Affect: Mood normal.         Behavior: Behavior normal.          Testing:   (All laboratory and radiology results have been personally reviewed by myself)  Labs:  No results found for this visit on 10/07/21. Imaging: All Radiology results interpreted by Radiologist unless otherwise noted. No orders to display       Assessment / Plan:   The patient's vitals, allergies, medications, and past medical history have been reviewed. Seamus Vasquez was seen today for cough, congestion and wheezing. Diagnoses and all orders for this visit:    Sinobronchitis  -     doxycycline hyclate (VIBRAMYCIN) 100 MG capsule; Take 1 capsule by mouth 2 times daily for 10 days  -     predniSONE (DELTASONE) 20 MG tablet; Take 1 tablet by mouth 3 times daily for 5 days  -     benzonatate (TESSALON PERLES) 100 MG capsule; 1-2 tablets every 8 hours as needed for cough    Mild intermittent asthma with exacerbation  -     predniSONE (DELTASONE) 20 MG tablet; Take 1 tablet by mouth 3 times daily for 5 days  -     methylPREDNISolone acetate (DEPO-MEDROL) injection 40 mg    Suspected COVID-19 virus infection  -     COVID-19 Ambulatory; Future        - Disposition: Home    - Educational material printed for patient's review and were included in patient instructions. After Visit Summary and given to patient at the end of visit. - COVID-19 swab obtained and pending, will call with results once available.  Advised cautionary self-quarantine at home per [facility] guidelines. Encouraged oral fluids and rest. Discussed symptomatic treatments with patient today including Albuterol inhaler 2 puffs QID x 5 days and Tylenol prn for fever / pain. Schedule a follow-up with PCP in 2-3 days. Red flag symptoms were discussed with the patient today. The patient is directed to go the ED if symptoms change or worsen. Pt verbalizes understanding and is in agreement with plan of care. All questions answered. SIGNATURE: LEROY Barahona-CNP    *NOTE: This report was transcribed using voice recognition software. Every effort was made to ensure accuracy; however, inadvertent computerized transcription errors may be present.

## 2021-10-13 ENCOUNTER — TELEPHONE (OUTPATIENT)
Dept: FAMILY MEDICINE CLINIC | Age: 71
End: 2021-10-13

## 2021-10-13 RX ORDER — BROMPHENIRAMINE MALEATE, PSEUDOEPHEDRINE HYDROCHLORIDE, AND DEXTROMETHORPHAN HYDROBROMIDE 2; 30; 10 MG/5ML; MG/5ML; MG/5ML
2.5 SYRUP ORAL 4 TIMES DAILY PRN
Qty: 118 ML | Refills: 0 | Status: SHIPPED
Start: 2021-10-13 | End: 2022-03-15

## 2021-10-13 NOTE — TELEPHONE ENCOUNTER
Requested Prescriptions     Signed Prescriptions Disp Refills    brompheniramine-pseudoephedrine-DM 2-30-10 MG/5ML syrup 118 mL 0     Sig: Take 2.5 mLs by mouth 4 times daily as needed for Cough     Authorizing Provider: Lamine Guevara     If not better would recommend reevaluation possible chest x-ray or other treatment

## 2021-10-13 NOTE — TELEPHONE ENCOUNTER
Neto Piña - Wife  She is calling about the cough he has. He was seen in the [place] office and treated for the cough with pills. They have not made a difference. She is asking if you will send a script to the [facility] for a liquid cough med?

## 2022-02-08 DIAGNOSIS — E78.2 MIXED HYPERLIPIDEMIA: ICD-10-CM

## 2022-02-08 DIAGNOSIS — I10 ESSENTIAL HYPERTENSION: ICD-10-CM

## 2022-02-08 DIAGNOSIS — F34.1 DYSTHYMIA: ICD-10-CM

## 2022-02-08 RX ORDER — ATORVASTATIN CALCIUM 10 MG/1
10 TABLET, FILM COATED ORAL DAILY
Qty: 30 TABLET | Refills: 0 | Status: SHIPPED
Start: 2022-02-08 | End: 2022-03-15 | Stop reason: SDUPTHER

## 2022-02-08 RX ORDER — AMLODIPINE BESYLATE 10 MG/1
10 TABLET ORAL EVERY MORNING
Qty: 30 TABLET | Refills: 0 | Status: SHIPPED
Start: 2022-02-08 | End: 2022-02-28 | Stop reason: SDUPTHER

## 2022-02-08 RX ORDER — FLUOXETINE HYDROCHLORIDE 40 MG/1
40 CAPSULE ORAL EVERY MORNING
Qty: 30 CAPSULE | Refills: 0 | Status: SHIPPED
Start: 2022-02-08 | End: 2022-03-15 | Stop reason: SDUPTHER

## 2022-02-28 DIAGNOSIS — I10 ESSENTIAL HYPERTENSION: ICD-10-CM

## 2022-02-28 RX ORDER — AMLODIPINE BESYLATE 10 MG/1
10 TABLET ORAL EVERY MORNING
Qty: 30 TABLET | Refills: 0 | Status: SHIPPED
Start: 2022-02-28 | End: 2022-03-15 | Stop reason: SDUPTHER

## 2022-02-28 NOTE — TELEPHONE ENCOUNTER
Last Appointment:  7/29/2021  Future Appointments   Date Time Provider Xiao Melony   3/10/2022  2:30 PM Sally Trinidad  [address] [place] [address]   3/10/2022  3:00 PM Sally Trinidad  [address]

## 2022-03-10 ENCOUNTER — TELEPHONE (OUTPATIENT)
Dept: FAMILY MEDICINE CLINIC | Age: 72
End: 2022-03-10

## 2022-03-10 NOTE — TELEPHONE ENCOUNTER
----- Message from Nalini Padilla sent at 3/10/2022 11:44 AM EST -----  Subject: Appointment Request    Reason for Call: Routine Medicare AWV    QUESTIONS  Type of Appointment? Established Patient  Reason for appointment request? No appointments available during search  Additional Information for Provider? Patient is requesting an AWV on 4/7   around the same time he has his 6 month f/u appt that day.  ---------------------------------------------------------------------------  --------------  [address]  What is the best way for the office to contact you? OK to leave message on   voicemail  Preferred Call Back Phone Number? [phone]  ---------------------------------------------------------------------------  --------------  SCRIPT ANSWERS  Relationship to Patient? Other  Representative Name? Jacqui Medina  Additional information verified (besides Name and Date of Birth)? Phone   Number  (If the patient has Medicare as their primary insurance coverage ask this   question) Are you requesting a Medicare Annual Wellness Visit? Yes   (Is the patient requesting a pap smear with their physical exam?)? No  (Is the patient requesting their annual physical and does not need PAP or   AWV per above?)? No  Have you been diagnosed with, awaiting test results for, or told that you   are suspected of having COVID-19 (Coronavirus)? (If patient has tested   negative or was tested as a requirement for work, school, or travel and   not based on symptoms, answer no)? No  Within the past 10 days have you developed any of the following symptoms   (answer no if symptoms have been present longer than 10 days or began   more than 10 days ago)? Fever or Chills, Cough, Shortness of breath or   difficulty breathing, Loss of taste or smell, Sore throat, Nasal   congestion, Sneezing or runny nose, Fatigue or generalized body aches   (answer no if pain is specific to a body part e.g. back pain), Diarrhea,   Headache?  No  Have you had close contact with someone with COVID-19 in the last 7 days? No  (Service Expert  click yes below to proceed with [employer] As Usual   Scheduling)?  Yes

## 2022-03-15 ENCOUNTER — OFFICE VISIT (OUTPATIENT)
Dept: FAMILY MEDICINE CLINIC | Age: 72
End: 2022-03-15
Payer: MEDICARE

## 2022-03-15 VITALS
SYSTOLIC BLOOD PRESSURE: 124 MMHG | DIASTOLIC BLOOD PRESSURE: 70 MMHG | HEART RATE: 75 BPM | BODY MASS INDEX: 32.73 KG/M2 | OXYGEN SATURATION: 98 % | WEIGHT: 221 LBS | TEMPERATURE: 97.3 F | HEIGHT: 69 IN

## 2022-03-15 VITALS
WEIGHT: 221 LBS | TEMPERATURE: 97.3 F | BODY MASS INDEX: 32.64 KG/M2 | SYSTOLIC BLOOD PRESSURE: 124 MMHG | DIASTOLIC BLOOD PRESSURE: 70 MMHG | HEART RATE: 75 BPM

## 2022-03-15 DIAGNOSIS — K29.70 GASTRITIS WITHOUT BLEEDING, UNSPECIFIED CHRONICITY, UNSPECIFIED GASTRITIS TYPE: ICD-10-CM

## 2022-03-15 DIAGNOSIS — R53.83 OTHER FATIGUE: ICD-10-CM

## 2022-03-15 DIAGNOSIS — G89.4 CHRONIC PAIN DISORDER: ICD-10-CM

## 2022-03-15 DIAGNOSIS — Z00.00 MEDICARE ANNUAL WELLNESS VISIT, SUBSEQUENT: Primary | ICD-10-CM

## 2022-03-15 DIAGNOSIS — M50.90 CERVICAL DISC DISEASE: ICD-10-CM

## 2022-03-15 DIAGNOSIS — E55.9 VITAMIN D DEFICIENCY: ICD-10-CM

## 2022-03-15 DIAGNOSIS — E78.2 MIXED HYPERLIPIDEMIA: ICD-10-CM

## 2022-03-15 DIAGNOSIS — K31.5 DUODENAL STRICTURE: ICD-10-CM

## 2022-03-15 DIAGNOSIS — F34.1 DYSTHYMIA: ICD-10-CM

## 2022-03-15 DIAGNOSIS — M81.8 OTHER OSTEOPOROSIS WITHOUT CURRENT PATHOLOGICAL FRACTURE: ICD-10-CM

## 2022-03-15 DIAGNOSIS — I10 ESSENTIAL HYPERTENSION: Primary | ICD-10-CM

## 2022-03-15 DIAGNOSIS — Z12.5 SCREENING FOR MALIGNANT NEOPLASM OF PROSTATE: ICD-10-CM

## 2022-03-15 DIAGNOSIS — Z79.899 LONG TERM CURRENT USE OF THERAPEUTIC DRUG: ICD-10-CM

## 2022-03-15 DIAGNOSIS — G89.29 CHRONIC NONINTRACTABLE HEADACHE, UNSPECIFIED HEADACHE TYPE: ICD-10-CM

## 2022-03-15 DIAGNOSIS — R51.9 CHRONIC NONINTRACTABLE HEADACHE, UNSPECIFIED HEADACHE TYPE: ICD-10-CM

## 2022-03-15 DIAGNOSIS — D12.6 ADENOMATOUS POLYP OF COLON, UNSPECIFIED PART OF COLON: ICD-10-CM

## 2022-03-15 DIAGNOSIS — F41.9 ANXIETY: ICD-10-CM

## 2022-03-15 DIAGNOSIS — M25.562 ACUTE PAIN OF LEFT KNEE: ICD-10-CM

## 2022-03-15 DIAGNOSIS — J45.21 MILD INTERMITTENT ASTHMA WITH EXACERBATION: ICD-10-CM

## 2022-03-15 DIAGNOSIS — R10.84 GENERALIZED ABDOMINAL PAIN: ICD-10-CM

## 2022-03-15 PROCEDURE — 1036F TOBACCO NON-USER: CPT | Performed by: INTERNAL MEDICINE

## 2022-03-15 PROCEDURE — G8417 CALC BMI ABV UP PARAM F/U: HCPCS | Performed by: INTERNAL MEDICINE

## 2022-03-15 PROCEDURE — G8484 FLU IMMUNIZE NO ADMIN: HCPCS | Performed by: INTERNAL MEDICINE

## 2022-03-15 PROCEDURE — G0439 PPPS, SUBSEQ VISIT: HCPCS | Performed by: INTERNAL MEDICINE

## 2022-03-15 PROCEDURE — G8427 DOCREV CUR MEDS BY ELIG CLIN: HCPCS | Performed by: INTERNAL MEDICINE

## 2022-03-15 PROCEDURE — 3017F COLORECTAL CA SCREEN DOC REV: CPT | Performed by: INTERNAL MEDICINE

## 2022-03-15 PROCEDURE — 99214 OFFICE O/P EST MOD 30 MIN: CPT | Performed by: INTERNAL MEDICINE

## 2022-03-15 PROCEDURE — 4040F PNEUMOC VAC/ADMIN/RCVD: CPT | Performed by: INTERNAL MEDICINE

## 2022-03-15 PROCEDURE — 1123F ACP DISCUSS/DSCN MKR DOCD: CPT | Performed by: INTERNAL MEDICINE

## 2022-03-15 RX ORDER — AMLODIPINE BESYLATE 10 MG/1
10 TABLET ORAL EVERY MORNING
Qty: 90 TABLET | Refills: 1 | Status: SHIPPED
Start: 2022-03-15 | End: 2022-08-02 | Stop reason: SDUPTHER

## 2022-03-15 RX ORDER — HYDROXYZINE HYDROCHLORIDE 25 MG/1
25 TABLET, FILM COATED ORAL EVERY 8 HOURS PRN
Qty: 30 TABLET | Refills: 0 | Status: SHIPPED
Start: 2022-03-15 | End: 2022-05-02 | Stop reason: SDUPTHER

## 2022-03-15 RX ORDER — ATORVASTATIN CALCIUM 10 MG/1
10 TABLET, FILM COATED ORAL DAILY
Qty: 90 TABLET | Refills: 1 | Status: SHIPPED
Start: 2022-03-15 | End: 2022-03-21 | Stop reason: SDUPTHER

## 2022-03-15 RX ORDER — FLUOXETINE HYDROCHLORIDE 40 MG/1
40 CAPSULE ORAL EVERY MORNING
Qty: 90 CAPSULE | Refills: 1 | Status: SHIPPED
Start: 2022-03-15 | End: 2022-08-02 | Stop reason: SDUPTHER

## 2022-03-15 RX ORDER — OMEPRAZOLE 40 MG/1
40 CAPSULE, DELAYED RELEASE ORAL
Qty: 90 CAPSULE | Refills: 1 | Status: SHIPPED
Start: 2022-03-15 | End: 2022-08-02 | Stop reason: SDUPTHER

## 2022-03-15 ASSESSMENT — ENCOUNTER SYMPTOMS
EYE PAIN: 0
CONSTIPATION: 0
ABDOMINAL PAIN: 1
CHEST TIGHTNESS: 0
RHINORRHEA: 0
DIARRHEA: 0
VOMITING: 0
SHORTNESS OF BREATH: 0
COUGH: 0
NAUSEA: 0
BLOOD IN STOOL: 0
SORE THROAT: 0

## 2022-03-15 ASSESSMENT — PATIENT HEALTH QUESTIONNAIRE - PHQ9
SUM OF ALL RESPONSES TO PHQ QUESTIONS 1-9: 5
1. LITTLE INTEREST OR PLEASURE IN DOING THINGS: 0
SUM OF ALL RESPONSES TO PHQ QUESTIONS 1-9: 5
7. TROUBLE CONCENTRATING ON THINGS, SUCH AS READING THE NEWSPAPER OR WATCHING TELEVISION: 1
6. FEELING BAD ABOUT YOURSELF - OR THAT YOU ARE A FAILURE OR HAVE LET YOURSELF OR YOUR FAMILY DOWN: 1
SUM OF ALL RESPONSES TO PHQ QUESTIONS 1-9: 5
10. IF YOU CHECKED OFF ANY PROBLEMS, HOW DIFFICULT HAVE THESE PROBLEMS MADE IT FOR YOU TO DO YOUR WORK, TAKE CARE OF THINGS AT HOME, OR GET ALONG WITH OTHER PEOPLE: 1
9. THOUGHTS THAT YOU WOULD BE BETTER OFF DEAD, OR OF HURTING YOURSELF: 0
SUM OF ALL RESPONSES TO PHQ9 QUESTIONS 1 & 2: 3
4. FEELING TIRED OR HAVING LITTLE ENERGY: 0
2. FEELING DOWN, DEPRESSED OR HOPELESS: 3
8. MOVING OR SPEAKING SO SLOWLY THAT OTHER PEOPLE COULD HAVE NOTICED. OR THE OPPOSITE, BEING SO FIGETY OR RESTLESS THAT YOU HAVE BEEN MOVING AROUND A LOT MORE THAN USUAL: 0
5. POOR APPETITE OR OVEREATING: 0
3. TROUBLE FALLING OR STAYING ASLEEP: 0
SUM OF ALL RESPONSES TO PHQ QUESTIONS 1-9: 5

## 2022-03-15 ASSESSMENT — LIFESTYLE VARIABLES
HOW MANY STANDARD DRINKS CONTAINING ALCOHOL DO YOU HAVE ON A TYPICAL DAY: 1 OR 2
HOW OFTEN DO YOU HAVE A DRINK CONTAINING ALCOHOL: 2-4 TIMES A MONTH

## 2022-03-15 NOTE — PATIENT INSTRUCTIONS
Patient Education        Knee: Exercises  Introduction  Here are some examples of exercises for you to try. The exercises may be suggested for a condition or for rehabilitation. Start each exercise slowly. Ease off the exercises if you start to have pain. You will be told when to start these exercises and which ones will work best for you. How to do the exercises  Quad sets    1. Sit with your leg straight and supported on the floor or a firm bed. (If you feel discomfort in the front or back of your knee, place a small towel roll under your knee.)  2. Tighten the muscles on top of your thigh by pressing the back of your knee flat down to the floor. (If you feel discomfort under your kneecap, place a small towel roll under your knee.)  3. Hold for about 6 seconds, then rest for up to 10 seconds. 4. Do 8 to 12 repetitions several times a day. Straight-leg raises to the front    1. Lie on your back with your good knee bent so that your foot rests flat on the floor. Your injured leg should be straight. Make sure that your low back has a normal curve. You should be able to slip your flat hand in between the floor and the small of your back, with your palm touching the floor and your back touching the back of your hand. 2. Tighten the thigh muscles in the injured leg by pressing the back of your knee flat down to the floor. Hold your knee straight. 3. Keeping the thigh muscles tight, lift your injured leg up so that your heel is about 12 inches off the floor. Hold for about 6 seconds and then lower slowly. 4. Do 8 to 12 repetitions, 3 times a day. Straight-leg raises to the outside    1. Lie on your side, with your injured leg on top. 2. Tighten the front thigh muscles of your injured leg to keep your knee straight. 3. Keep your hip and your leg straight in line with the rest of your body, and keep your knee pointing forward. Do not drop your hip back.   4. Lift your injured leg straight up toward the ceiling, about 12 inches off the floor. Hold for about 6 seconds, then slowly lower your leg. 5. Do 8 to 12 repetitions. Straight-leg raises to the back    1. Lie on your stomach, and lift your leg straight up behind you (toward the ceiling). 2. Lift your toes about 6 inches off the floor, hold for about 6 seconds, then lower slowly. 3. Do 8 to 12 repetitions. Straight-leg raises to the inside    1. Lie on the side of your body with the injured leg. 2. You can either prop your other (good) leg up on a chair, or you can bend your good knee and put that foot in front of your injured knee. Do not drop your hip back. 3. Tighten the muscles on the front of your thigh to straighten your injured knee. 4. Keep your kneecap pointing forward, and lift your whole leg up toward the ceiling about 6 inches. Hold for about 6 seconds, then lower slowly. 5. Do 8 to 12 repetitions. Heel dig bridging    1. Lie on your back with both knees bent and your ankles bent so that only your heels are digging into the floor. Your knees should be bent about 90 degrees. 2. Then push your heels into the floor, squeeze your buttocks, and lift your hips off the floor until your shoulders, hips, and knees are all in a straight line. 3. Hold for about 6 seconds as you continue to breathe normally, and then slowly lower your hips back down to the floor and rest for up to 10 seconds. 4. Do 8 to 12 repetitions. Hamstring curls    1. Lie on your stomach with your knees straight. If your kneecap is uncomfortable, roll up a washcloth and put it under your leg just above your kneecap. 2. Lift the foot of your injured leg by bending the knee so that you bring the foot up toward your buttock. If this motion hurts, try it without bending your knee quite as far. This may help you avoid any painful motion. 3. Slowly lower your leg back to the floor. 4. Do 8 to 12 repetitions.   5. With permission from your doctor or physical therapist, you may also want to liability for your use of this information.

## 2022-03-15 NOTE — PROGRESS NOTES
Medicare Annual Wellness Visit    Edda Rendon is here for Medicare AWV    Assessment & Plan   Medicare annual wellness visit, subsequent         Health Maintenance   - immunizations:   Influenza Vaccination  - (9/14/2015) , (2017), (2019), (2020)   Pneumonia Vaccination - (10/2016) - prevnar, (10/2017) - pneumococcal 23  Zoster/Shingles Vaccine - (2020) x 2   Tetanus Vacc[facility]  - [place] [facility]  cov[facility] (1/2021) #1, (2/21/2021) #2 - [facility] pfizer     - Screenings:   Bone Density Test Screening - (10/25/2018)  Colonoscopy - (2014), (1/2020) - Minimal diverticulosis, sigmoid polyp x2 s/p polypectomy 3 to 4 mm, hemrrhoid, path - tubular and hyperplastic, follow-up in 2-3 years  Prostate     Recommendations for Preventive Services Due: see orders and patient instructions/AVS.  Recommended screening schedule for the next 5-10 years is provided to the patient in written form: see Patient Instructions/AVS.     Return for Medicare Annual Wellness Visit in 1 year. No orders of the defined types were placed in this encounter. Requested Prescriptions      No prescriptions requested or ordered in this encounter          Subjective     Patient's complete Health Risk Assessment and screening values have been reviewed and are found in Flowsheets. The following problems were reviewed today and where indicated follow up appointments were made and/or referrals ordered.     Positive Risk Factor Screenings with Interventions:    Fall Risk:  Do you feel unsteady or are you worried about falling? : no  2 or more falls in past year?: no  Fall with injury in past year?: (!) yes     Fall Risk Interventions:    · Home safety tips provided     Depression:  PHQ-2 Score: 3  PHQ-9 Total Score: 5    Severity:1-4 = minimal depression, 5-9 = mild depression, 10-14 = moderate depression, 15-19 = moderately severe depression, 20-27 = severe depression    Depression Interventions:  · advised to follow up with psychology, added hyroxyzine prn General Health and ACP:  General  In general, how would you say your health is?: Good  In the past 7 days, have you experienced any of the following: New or Increased Pain, New or Increased Fatigue, Loneliness, Social Isolation, Stress or Anger?: (!) Yes  Select all that apply: (!) New or Increased Pain,Stress  Do you get the social and emotional support that you need?: (!) No  Do you have a Living Will?: (!) No    Advance Directives     Power of  Living Will ACP-Advance Directive ACP-Power of     Not on File Not on File Not on File Not on File      General Health Risk Interventions:  · Pain issues: patient declines any further evaluation/treatment for this issue  · Stress: follow up with psych   · Inadequate social/emotional support: patient declines any further intervention for this issue  · No Living Will: information provided     Health Habits/Nutrition:     Physical Activity: Sufficiently Active    Days of Exercise per Week: 7 days    Minutes of Exercise per Session: 30 min     Have you lost any weight without trying in the past 3 months?: No     Have you seen the dentist within the past year?: (!) No    Health Habits/Nutrition Interventions:  · Dental exam overdue:  patient encouraged to make appointment with his/her dentist             Objective   Vitals:    03/15/22 1446   BP: 124/70   Pulse: 75   Temp: 97.3 °F (36.3 °C)   Weight: 221 lb (100.2 kg)      Body mass index is 32.64 kg/m². No Known Allergies  Prior to Visit Medications    Medication Sig Taking?  Authorizing Provider   [facility] GARLIC PO Take 10 mg by mouth daily  Historical Provider, MD   amLODIPine (NORVASC) 10 MG tablet Take 1 tablet by mouth every morning  Castro Neumann MD   atorvastatin (LIPITOR) 10 MG tablet Take 1 tablet by mouth daily  Castro Neumann MD   FLUoxetine (PROZAC) 40 MG capsule Take 1 capsule by mouth every morning  Castro Neumann MD   omeprazole (PRILOSEC) 40 MG delayed release capsule Take 1 capsule by mouth every morning (before breakfast)  Allie Carver MD   hydrOXYzine (ATARAX) 25 MG tablet Take 1 tablet by mouth every 8 hours as needed for Anxiety  Allie Carver MD   MULTIPLE VITAMIN PO daily Ld 6/15/2020  Historical Provider, MD   butalbital-acetaminophen-caffeine (FIORICET, ESGIC) -40 MG per tablet Take 1 tablet by mouth every 4 hours as needed for Headaches  Historical Provider, MD   niacin 500 MG extended release capsule Take 500 mg by mouth nightly Ld 6/15/2020  Historical Provider, MD   aspirin 81 MG tablet Take 81 mg by mouth daily   Historical Provider, MD   albuterol sulfate HFA (PROVENTIL HFA) 108 (90 Base) MCG/ACT inhaler Inhale into the lungs every 4 hours as needed Instructed to take am of procedure if needed and bring [place] [id number] Provider, MD   oxyCODONE-acetaminophen (PERCOCET) 5-325 MG per tablet Take 1 tablet by mouth 2 times daily.  Instructed to take am of procedure  Historical Provider, MD   lisinopril (PRINIVIL;ZESTRIL) 20 MG tablet Take 20 mg by mouth 2 times daily   Historical Provider, MD   atenolol (TENORMIN) 100 MG tablet Take 100 mg by mouth every morning   Historical Provider, MD Ba (Including outside providers/suppliers regularly involved in providing care):   Patient Care Team:  Allie Carver MD as PCP - General (Internal Medicine)  Allie Carver MD as PCP - [facility] Empaneled Provider    Reviewed and updated this visit:  Allergies  Meds  Problems                   Electronically signed by Allie Carver MD on 3/15/2022 at 3:29 PM

## 2022-03-15 NOTE — PROGRESS NOTES
[facility]) Physicians   Internal Medicine     3/15/2022  Liz Kim : 1950 Sex: male  Age:71 y.o. Chief Complaint   Patient presents with    3 Month Follow-Up    Hypertension    Knee Pain     Fell while walking dog in the snow and ice. Still has left knee pain.  Anxiety     Panic attacks. PHQ 9 score 5 3/15/22        HPI:   Patient presents to office for evaluation of the following medical concerns. - States pain to left knee. States fell and landed on knee while walking dog in snow 1 month ago. States still with pain. - States having generalized abdominal pain, has improved and stable. Found duodenal stricture. States has had EGD and colonoscopy (2020)- showed gastritis and duodenal stricture, had follow up EGD and had dilation (2020)  - referred to endo surgery. Had EGD with EUS (10/20) - Duodenal stricture just distal to the duodenal bulb path - blood with few benign glandular cells. States some left sided soreness. On omeprazole. No nausea, no vomiting. No diarrhea or constipation. No melena or hematochezia. For follow up in -10/2021.     - States has hypertension. States does check blood pressure at home, blood pressure 120-130's/70-80. Occasional in 140's. On amlodipine, lisinopril and atenolol. No reported side effects.     - Has high cholesterol. Trying to watch diet. On atorvastatin. No reported side effects.     - States has depression. On Prozac 40mg - helping. States still having depression, given medical issues and family issues. States no suicidal thoughts. Discussed counseling - declines. States had increased prozac to 60mg but had diarrhea. Last PHQ score of 5 (3/15/2022). States having panic issues. States has been having panic issues. - States had right detached retina. States following with specialist. Using eye drops at moment. States s/p repair (2019) and cataract repair.     - Bone density scan showed osteoporosis of hip. Was on fosamax.  States stopped - due to stomach pain. Has issues with reclast with stomach. Discussed other option in treatment - different bisphosphonate or prolia. Declines. - States has migraines. Up and down. Still asking about stadol. States has seen neurology - not improved - tried Topamax and Imitrex. Did not tolerateTopamax, Stopped amitriptyline. Taking butalbital/aspirin/caffeine.    - States has asthma and stable with inhaler. States SOB is better. - State pain to bilateral shoulders. [facility] had right shoulder injection per ortho (2018). [facility] had MRI on right, shows partial tears, fluid and degenerative changes. [facility] - last 2021    - [facility] has seen chronic pain management, not currently following. [facility] has had injections to right neck for right shoulder x 3 and [facility] had Radio frequency ablation x 1. [facility] has helped b/l shoulder pain. [facility] has had MRI on cervical spine through [place] - multiple osteophytes, disc disease and previous c5-6 fusion.    - States still has lower back pain. Unchanged. No recent trauma or injury. No bowel or bladder incontinence. No fever or chills. No numbness, weakness. States pain as sharp shooting. No radicular pain.     - [facility]  (8/21) - cbc - neg, cmp cl incr, cr 1.3, gfr 55, protein 6.3    Health Maintenance   - immunizations:   Influenza Vaccination  - (9/14/2015) , (2017), (2019), (2020)   Pneumonia Vaccination - (10/2016) - prevnar, (10/2017) - pneumococcal 23  Zoster/Shingles Vaccine - (2020) x 2   Tetanus Vaccination  - (2015) - [facility]  [facility] (1/2021) #1, (2/21/2021) #2 - [facility]     - Screenings:   Bone Density Test Screening - (10/25/2018)  Colonoscopy - (2014), (1/2020) - Minimal diverticulosis, sigmoid polyp x2 s/p polypectomy 3 to 4 mm, hemrrhoid, path - tubular and hyperplastic, follow-up in 2-3 years  Prostate     Couseled on Home Safety - (11/13/2017)    carotid ultrasound - (3/2015) - no significant stenosis    EGD - (1/2020) - Gastritis and gastric ulcer status post biopsy, duodenitis, Difficult to advance around the duodenal sweep due to apparent benign-appearing stricturing, poss due to ulcer - order upper GI , Upper GI confirm stricture, (6/20) duodenal stricture s/p dilation, refer to endosurger    ROS:  Review of Systems   Constitutional: Negative for appetite change, chills, fever and unexpected weight change. HENT: Negative for congestion, rhinorrhea and sore throat. Eyes: Positive for visual disturbance. Negative for pain. Respiratory: Negative for cough, chest tightness and shortness of breath. Cardiovascular: Negative for chest pain and palpitations. Gastrointestinal: Positive for abdominal pain. Negative for blood in stool, constipation, diarrhea, nausea and vomiting. Genitourinary: Negative for difficulty urinating, dysuria, hematuria, scrotal swelling, testicular pain and urgency. Musculoskeletal: Negative for arthralgias and gait problem. Skin: Negative for rash. Neurological: Positive for headaches. Negative for dizziness, syncope, weakness and light-headedness. Hematological: Negative for adenopathy. Does not bruise/bleed easily. Psychiatric/Behavioral: Negative for suicidal ideas. The patient is not nervous/anxious.           Current Outpatient Medications:     [employer] GARLIC PO, Take 10 mg by mouth daily, Disp: , Rfl:     amLODIPine (NORVASC) 10 MG tablet, Take 1 tablet by mouth every morning, Disp: 90 tablet, Rfl: 1    atorvastatin (LIPITOR) 10 MG tablet, Take 1 tablet by mouth daily, Disp: 90 tablet, Rfl: 1    FLUoxetine (PROZAC) 40 MG capsule, Take 1 capsule by mouth every morning, Disp: 90 capsule, Rfl: 1    omeprazole (PRILOSEC) 40 MG delayed release capsule, Take 1 capsule by mouth every morning (before breakfast), Disp: 90 capsule, Rfl: 1    hydrOXYzine (ATARAX) 25 MG tablet, Take 1 tablet by mouth every 8 hours as needed for Anxiety, Disp: 30 tablet, Rfl: 0    MULTIPLE VITAMIN PO, daily Ld 6/15/2020, Disp: , Rfl:     butalbital-acetaminophen-caffeine (FIORICET, ESGIC) -40 MG per tablet, Take 1 tablet by mouth every 4 hours as needed for Headaches, Disp: , Rfl:     niacin 500 MG extended release capsule, Take 500 mg by mouth nightly Ld 6/15/2020, Disp: , Rfl:     aspirin 81 MG tablet, Take 81 mg by mouth daily , Disp: , Rfl:     albuterol sulfate HFA (PROVENTIL HFA) 108 (90 Base) MCG/ACT inhaler, Inhale into the lungs every 4 hours as needed Instructed to take am of procedure if needed and bring dos, Disp: , Rfl:     oxyCODONE-acetaminophen (PERCOCET) 5-325 MG per tablet, Take 1 tablet by mouth 2 times daily.  Instructed to take am of procedure, Disp: , Rfl:     lisinopril (PRINIVIL;ZESTRIL) 20 MG tablet, Take 20 mg by mouth 2 times daily , Disp: , Rfl:     atenolol (TENORMIN) 100 MG tablet, Take 100 mg by mouth every morning , Disp: , Rfl:     No Known Allergies    Past Medical History:   Diagnosis Date    Asthma     Depression     GERD (gastroesophageal reflux disease)     Hyperlipidemia     Hypertension     Migraine     Osteoporosis     Right retinal detachment 04/2019       Past Surgical History:   Procedure Laterality Date    CATARACT REMOVAL WITH IMPLANT Right     COLONOSCOPY N/A 1/14/2020    COLONOSCOPY WITH BIOPSY performed by Betty Jaramillo MD at [address] [facility] Right 4/25/2019    RIGHT EYE RETINAL DETACHMENT REPAIR, PARS PLANA VITRECTOMY 25 GAUGE, ENDO LASER GAS FLUID EXCHANGE (SF6)++LATEX ALLERGY++ performed by Deidre Laurent MD at [address] N/A 1/14/2020    EGD BIOPSY performed by Betty Jaramillo MD at [address] [facility] N/A 6/18/2020    EGD DILATION BALLOON performed by Betty Jaramillo MD at [address] [facility] 10/23/2020    ENDOSCOPIC EGD ULTRASOUND performed by Arleen Guevara MD at [address] [facility]  10/23/2020    EGD DILATION BALLOON performed by Arleen Guevara MD at [address] [facility]  10/23/2020    EGD BIOPSY performed by Arleen Guevara MD at [address] [facility]         No family history on file. Social History     Socioeconomic History    Marital status:      Spouse name: Not on file    Number of children: Not on file    Years of education: Not on file    Highest education level: Not on file   Occupational History    Not on file   Tobacco Use    Smoking status: Never Smoker    Smokeless tobacco: Never Used   Vaping Use    Vaping Use: Never used   Substance and Sexual Activity    Alcohol use: Yes     Comment: occ     Drug use: No    Sexual activity: Not on file   Other Topics Concern    Not on file   Social History Narrative    Not on file     Social Determinants of Health     Financial Resource Strain: Low Risk     Difficulty of Paying Living Expenses: Not hard at all   Food Insecurity: No Food Insecurity    Worried About [facility] [employer] in the Last Year: Never true    [facility] in the Last Year: Never true   Transportation Needs:     Lack of Transportation (Medical): Not on file    Lack of Transportation (Non-Medical):  Not on file   Physical Activity: Sufficiently Active    Days of Exercise per Week: 7 days    Minutes of Exercise per Session: 30 min   Stress:     Feeling of Stress : Not on file   Social Connections:     Frequency of Communication with Friends and Family: Not on file    Frequency of Social Gatherings with Friends and Family: Not on file    Attends Mosque Services: Not on file    Active Member of Clubs or Organizations: Not on file    Attends Club or Organization Meetings: Not on file    Marital Status: Not on file   Intimate Partner Violence:     Fear of Current or Ex-Partner: Not on file    Emotionally Abused: Not on file    Physically Abused: Not on file    Sexually Abused: Not on file   Housing Stability:     Unable to Pay for Housing in the Last Year: Not on file    Number of Places Lived in the Last Year: Not on file    Unstable Housing in the Last Year: Not on file       Vitals:    03/15/22 1432   BP: 124/70   Pulse: 75   Temp: 97.3 °F (36.3 °C)   SpO2: 98%   Weight: 221 lb (100.2 kg)   Height: 5' 9\" (1.753 m)       Exam:  Physical Exam  Constitutional:       Appearance: He is well-developed. HENT:      Head: Normocephalic and atraumatic. Right Ear: External ear normal.      Left Ear: External ear normal.   Eyes:      Pupils: Pupils are equal, round, and reactive to light. Neck:      Thyroid: No thyromegaly. Cardiovascular:      Rate and Rhythm: Normal rate and regular rhythm. Heart sounds: Normal heart sounds. No murmur heard. Pulmonary:      Effort: Pulmonary effort is normal.      Breath sounds: Normal breath sounds. No wheezing or rales. Abdominal:      General: Bowel sounds are normal.      Palpations: Abdomen is soft. Tenderness: There is generalized abdominal tenderness. There is no guarding or rebound. Musculoskeletal:      Right shoulder: Decreased range of motion. Left shoulder: Decreased range of motion. Cervical back: Normal range of motion and neck supple. Lumbar back: Decreased range of motion. Lymphadenopathy:      Cervical: No cervical adenopathy. Skin:     General: Skin is warm and dry. Neurological:      Mental Status: He is alert and oriented to person, place, and time. Cranial Nerves: No cranial nerve deficit.    Psychiatric:         Judgment: Judgment normal.         Assessment and Plan:    Diagnoses and all orders for this visit:    Acute pain of left knee  - poss strain and sprain from fall   - will check xray   - compression, rest, ice   - home exercises   - if not better ortho - declines at present     Gastritis without bleeding, unspecified chronicity, unspecified gastritis type  - on omeprazole   - following with surgery  - last EGD (6/2020) which showed healing    - improved with PPI     Duodenal stricture  - on omeprazole   - following with surgery  - EGD (6/2020) which showed healing   - had EGD with EUS (10/2020) s/p dilation   - follow up in 1 year     Essential hypertension  - monitor blood pressure at home  - watch diet  - on amlodipine   - on atenolol   - on lisinopril   - stable     Mixed hyperlipidemia  - watch diet  - on atrovastatin   - follow labs   - stable per labs   - lst lab (2/2021)     Dysthymia  - referral to counseling - declined  - did not tolerate zoloft  - off cymbalta  - on prozac 40mg  - did not tolerate increase of prozac to 60mg   - PHQ score of 5 (3/15/2022)   - add hydroxyzine 25mg prn - side effects reviewed     Stage 2 chronic kidney disease  - stopped meloxicam  - avoid NSAID  - recheck labs    Nontraumatic incomplete tear of right rotator cuff  - has been following with pain management - injections   - had abnormality of humerus on right, MRI 2018   - [facility]   - Stable     Cervical disc disease  - previous c5-6 surgery  - weakness of forearm  - Stable and unchanged     Chronic nonintractable headache, unspecified headache type  - continue present treatment  - not currently following with neurology  - did not tolerate Topamax  - off amitriptyline  - has asked for stadol - discussed not recommended  - on fiorcet     Chronic pain disorder  - no longer following with pain management - do not accept insurance  - has had injections - poss RFA  - has asked for stadol for migraine    Chronic bilateral low back pain without sciatica  - no longer following with pain management  - s/p RFA and injections - uncertain benefit     Other osteoporosis without current pathological fracture  - DEXA (11/2018)  - started fosamax - had to stop due to stomach pain, discussed trying another type, switch to prolia or trying reclast infusion.   - had reclast      Adenomatous polyp of colon, unspecified part of colon  - last colonoscopy (1/2020)   - follow up in 2-3 years      Return in about 3 months (around 6/15/2022) for check up and review.     Orders Placed This Encounter   Procedures    XR KNEE LEFT (MIN 4 VIEWS)     Standing Status:   Future     Standing Expiration Date:   3/15/2023     Order Specific Question:   Reason for exam:     Answer:   fall pain    Comprehensive Metabolic Panel     Standing Status:   Future     Standing Expiration Date:   3/15/2023    Magnesium     Standing Status:   Future     Standing Expiration Date:   3/15/2023    Lipid, Fasting     Standing Status:   Future     Standing Expiration Date:   3/15/2023    Vitamin D 25 Hydroxy     Standing Status:   Future     Standing Expiration Date:   3/15/2023    TSH     Standing Status:   Future     Standing Expiration Date:   3/15/2023    Urinalysis     Standing Status:   Future     Standing Expiration Date:   3/15/2023    PSA Screening     Standing Status:   Future     Standing Expiration Date:   3/15/2023    CBC with Auto Differential     Standing Status:   Future     Standing Expiration Date:   3/15/2023    Vitamin B12 & Folate     Standing Status:   Future     Standing Expiration Date:   3/15/2023     Requested Prescriptions     Signed Prescriptions Disp Refills    amLODIPine (NORVASC) 10 MG tablet 90 tablet 1     Sig: Take 1 tablet by mouth every morning    atorvastatin (LIPITOR) 10 MG tablet 90 tablet 1     Sig: Take 1 tablet by mouth daily    FLUoxetine (PROZAC) 40 MG capsule 90 capsule 1     Sig: Take 1 capsule by mouth every morning    omeprazole (PRILOSEC) 40 MG delayed release capsule 90 capsule 1     Sig: Take 1 capsule by mouth every morning (before breakfast)    hydrOXYzine (ATARAX) 25 MG tablet 30 tablet 0     Sig: Take 1 tablet by mouth every 8 hours as needed for Anxiety     Lilly Yarbrough MD  3/15/2022  3:22 PM

## 2022-03-15 NOTE — PATIENT INSTRUCTIONS
Personalized Preventive Plan for Benita Harden - 3/15/2022  Medicare offers a range of preventive health benefits. Some of the tests and screenings are paid in full while other may be subject to a deductible, co-insurance, and/or copay. Some of these benefits include a comprehensive review of your medical history including lifestyle, illnesses that may run in your family, and various assessments and screenings as appropriate. After reviewing your medical record and screening and assessments performed today your provider may have ordered immunizations, labs, imaging, and/or referrals for you. A list of these orders (if applicable) as well as your Preventive Care list are included within your After Visit Summary for your review. Other Preventive Recommendations:    · A preventive eye exam performed by an eye specialist is recommended every 1-2 years to screen for glaucoma; cataracts, macular degeneration, and other eye disorders. · A preventive dental visit is recommended every 6 months. · Try to get at least 150 minutes of exercise per week or 10,000 steps per day on a pedometer . · Order or download the FREE \"Exercise & Physical Activity: Your Everyday Guide\" from [employer]. Call 8-574.426.3839 or search [employer] online. · You need 9634-7099 mg of calcium and 7713-4269 IU of vitamin D per day. It is possible to meet your calcium requirement with diet alone, but a vitamin D supplement is usually necessary to meet this goal.  · When exposed to the sun, use a sunscreen that protects against both UVA and UVB radiation with an SPF of 30 or greater. Reapply every 2 to 3 hours or after sweating, drying off with a towel, or swimming. · Always wear a seat belt when traveling in a car. Always wear a helmet when riding a bicycle or motorcycle. Heart-Healthy Diet   Sodium, Fat, and Cholesterol Controlled Diet       What Is a Heart Healthy Diet?    A heart-healthy diet is one that limits sodium , certain types of fat , and cholesterol . This type of diet is recommended for:   People with any form of cardiovascular disease (eg, coronary heart disease , peripheral vascular disease , previous heart attack , previous stroke )   People with risk factors for cardiovascular disease, such as high blood pressure , high cholesterol , or diabetes   Anyone who wants to lower their risk of developing cardiovascular disease   Sodium    Sodium is a mineral found in many foods. In general, most people consume much more sodium than they need. Diets high in sodium can increase blood pressure and lead to edema (water retention). On a heart-healthy diet, you should consume no more than 2,300 mg (milligrams) of sodium per dayabout the amount in one teaspoon of table salt. The foods highest in sodium include table salt (about 50% sodium), processed foods, convenience foods, and preserved foods. Cholesterol    Cholesterol is a fat-like, waxy substance in your blood. Our bodies make some cholesterol. It is also found in animal products, with the highest amounts in fatty meat, egg yolks, whole milk, cheese, shellfish, and organ meats. On a heart-healthy diet, you should limit your cholesterol intake to less than 200 mg per day. It is normal and important to have some cholesterol in your bloodstream. But too much cholesterol can cause plaque to build up within your arteries, which can eventually lead to a heart attack or stroke. The two types of cholesterol that are most commonly referred to are:   Low-density lipoprotein (LDL) cholesterol  Also known as bad cholesterol, this is the cholesterol that tends to build up along your arteries. Bad cholesterol levels are increased by eating fats that are saturated or hydrogenated. Optimal level of this cholesterol is less than 100. Over 130 starts to get risky for heart disease.    High-density lipoprotein (HDL) cholesterol  Also known as good cholesterol, this type of cholesterol actually carries cholesterol away from your arteries and may, therefore, help lower your risk of having a heart attack. You want this level to be high (ideally greater than 60). It is a risk to have a level less than 40. You can raise this good cholesterol by eating olive oil, canola oil, avocados, or nuts. Exercise raises this level, too. Fat    Fat is calorie dense and packs a lot of calories into a small amount of food. Even though fats should be limited due to their high calorie content, not all fats are bad. In fact, some fats are quite healthful. Fat can be broken down into four main types. The good-for-you fats are:   Monounsaturated fat  found in oils such as olive and canola, avocados, and nuts and natural nut butters; can decrease cholesterol levels, while keeping levels of HDL cholesterol high   Polyunsaturated fat  found in oils such as safflower, sunflower, soybean, corn, and sesame; can decrease total cholesterol and LDL cholesterol   Omega-3 fatty acids  particularly those found in fatty fish (such as salmon, trout, tuna, mackerel, herring, and sardines); can decrease risk of arrhythmias, decrease triglyceride levels, and slightly lower blood pressure   The fats that you want to limit are:   Saturated fat  found in animal products, many fast foods, and a few vegetables; increases total blood cholesterol, including LDL levels   Animal fats that are saturated include: butter, lard, whole-milk dairy products, meat fat, and poultry skin   Vegetable fats that are saturated include: hydrogenated shortening, palm oil, coconut oil, cocoa butter   Hydrogenated or trans fat  found in margarine and vegetable shortening, most shelf stable snack foods, and fried foods; increases LDL and decreases HDL     It is generally recommended that you limit your total fat for the day to less than 30% of your total calories.  If you follow an 1800-calorie heart healthy diet, for example, this would mean 60 grams of fat or less per day. Saturated fat and trans fat in your diet raises your blood cholesterol the most, much more than dietary cholesterol does. For this reason, on a heart-healthy diet, less than 7% of your calories should come from saturated fat and ideally 0% from trans fat. On an 1800-calorie diet, this translates into less than 14 grams of saturated fat per day, leaving 46 grams of fat to come from mono- and polyunsaturated fats.    Food Choices on a Heart Healthy Diet   Food Category   Foods Recommended   Foods to Avoid   Grains   Breads and rolls without salted tops Most dry and cooked cereals Unsalted crackers and breadsticks Low-sodium or homemade breadcrumbs or stuffing All rice and pastas   Breads, rolls, and crackers with salted tops High-fat baked goods (eg, muffins, donuts, pastries) Quick breads, self-rising flour, and biscuit mixes Regular bread crumbs Instant hot cereals Commercially prepared rice, pasta, or stuffing mixes   Vegetables   Most fresh, frozen, and low-sodium canned vegetables Low-sodium and salt-free vegetable juices Canned vegetables if unsalted or rinsed   Regular canned vegetables and juices, including sauerkraut and pickled vegetables Frozen vegetables with sauces Commercially prepared potato and vegetable mixes   Fruits   Most fresh, frozen, and canned fruits All fruit juices   Fruits processed with salt or sodium   Milk   Nonfat or low-fat (1%) milk Nonfat or low-fat yogurt Cottage cheese, low-fat ricotta, cheeses labeled as low-fat and low-sodium   Whole milk Reduced-fat (2%) milk Malted and chocolate milk Full fat yogurt Most cheeses (unless low-fat and low salt) Buttermilk (no more than 1 cup per week)   Meats and Beans   Lean cuts of fresh or frozen beef, veal, lamb, or pork (look for the word loin) Fresh or frozen poultry without the skin Fresh or frozen fish and some shellfish Egg whites and egg substitutes (Limit whole eggs to three per week) Tofu Nuts or seeds (unsalted, dry-roasted), low-sodium peanut butter Dried peas, beans, and lentils   Any smoked, cured, salted, or canned meat, fish, or poultry (including hager, chipped beef, cold cuts, hot dogs, sausages, sardines, and anchovies) Poultry skins Breaded and/or fried fish or meats Canned peas, beans, and lentils Salted nuts   Fats and Oils   Olive oil and canola oil Low-sodium, low-fat salad dressings and mayonnaise   Butter, margarine, coconut and palm oils, hager fat   Snacks, Sweets, and Condiments   Low-sodium or unsalted versions of broths, soups, soy sauce, and condiments Pepper, herbs, and spices; vinegar, lemon, or lime juice Low-fat frozen desserts (yogurt, sherbet, fruit bars) Sugar, cocoa powder, honey, syrup, jam, and preserves Low-fat, trans-fat free cookies, cakes, and pies [place] and animal crackers, fig bars, giovanni snaps   High-fat desserts Broth, soups, gravies, and sauces, made from instant mixes or other high-sodium ingredients Salted snack foods Canned olives Meat tenderizers, seasoning salt, and most flavored vinegars   Beverages   Low-sodium carbonated beverages Tea and coffee in moderation Soy milk   Commercially softened water   Suggestions   Make whole grains, fruits, and vegetables the base of your diet. Choose heart-healthy fats such as canola, olive, and flaxseed oil, and foods high in heart-healthy fats, such as nuts, seeds, soybeans, tofu, and fish. Eat fish at least twice per week; the fish highest in omega-3 fatty acids and lowest in mercury include salmon, herring, mackerel, sardines, and canned chunk light tuna. If you eat fish less than twice per week or have high triglycerides, talk to your doctor about taking fish oil supplements. Read food labels.    For products low in fat and cholesterol, look for fat free, low-fat, cholesterol free, saturated fat free, and trans fat freeAlso scan the Nutrition Facts Label, which lists saturated fat, trans fat, and cholesterol amounts. For products low in sodium, look for sodium free, very low sodium, low sodium, no added salt, and unsalted   Skip the salt when cooking or at the table; if food needs more flavor, get creative and try out different herbs and spices. Garlic and onion also add substantial flavor to foods. Trim any visible fat off meat and poultry before cooking, and drain the fat off after lino. Use cooking methods that require little or no added fat, such as grilling, boiling, baking, poaching, broiling, roasting, steaming, stir-frying, and sauting. Avoid fast food and convenience food. They tend to be high in saturated and trans fat and have a lot of added salt. Talk to a registered dietitian for individualized diet advice. Last Reviewed: March 2011 Claudia Head MS, MPH, RD   Updated: 3/29/2011   ·     Keep Your Memory Willeen Peeling       Many factors can affect your ability to remembera hectic lifestyle, aging, stress, chronic disease, and certain medicines. But, there are steps you can take to sharpen your mind and help preserve your memory. Challenge Your Brain   Regularly challenging your mind may help keeps it in top shape. Good mental exercises include:   Crossword puzzlesUse a dictionary if you need it; you will learn more that way. Brainteasers Try some! Crafts, such as wood working and sewing   Hobbies, such as gardening and building model airplanes   SocializingVisit old friends or join groups to meet new ones. Reading   Learning a new language   Taking a class, whether it be art history or sadi chi   TravelingExperience the food, history, and culture of your destination   Learning to use a computer   Going to museums, the theater, or thought-provoking movies   Changing things in your daily life, such as reversing your pattern in the grocery store or brushing your teeth using your nondominant hand   Use Memory Aids   There is no need to remember every detail on your own.  These memory aids can help:   Calendars and day planners   Electronic organizers to store all sorts of helpful informationThese devices can \"beep\" to remind you of appointments. A book of days to record birthdays, anniversaries, and other occasions that occur on the same date every year   Detailed \"to-do\" lists and strategically placed sticky notes   Quick \"study\" sessionsBefore a gathering, review who will be there so their names will be fresh in your mind. Establish routinesFor example, keep your keys, wallet, and umbrella in the same place all the time or take medicine with your 8:00 AM glass of juice   Live a Healthy Life   Many actions that will keep your body strong will do the same for your mind. For example:   Talk to Your Doctor About Herbs and Supplements    Malnutrition and vitamin deficiencies can impair your mental function. For example, vitamin B12 deficiency can cause a range of symptoms, including confusion. But, what if your nutritional needs are being met? Can herbs and supplements still offer a benefit? Researchers have investigated a range of natural remedies, such as ginkgo , ginseng , and the supplement phosphatidylserine (PS). So far, though, the evidence is inconsistent as to whether these products can improve memory or thinking. If you are interested in taking herbs and supplements, talk to your doctor first because they may interact with other medicines that you are taking. Exercise Regularly    Among the many benefits of regular exercise are increased blood flow to the brain and decreased risk of certain diseases that can interfere with memory function. One study found that even moderate exercise has a beneficial effect. Examples of \"moderate\" exercise include:   Playing 18 holes of golf once a week, without a cart   Playing tennis twice a week   Walking one mile per day   Manage Stress    It can be tough to remember what is important when your mind is cluttered.  Make time for relaxation. Choose activities that calm you down, and make it routine. Manage Chronic Conditions    Side effects of high blood pressure , diabetes, and heart disease can interfere with mental function. Many of the lifestyle steps discussed here can help manage these conditions. Strive to eat a healthy diet, exercise regularly, get stress under control, and follow your doctor's advice for your condition. Minimize Medications    Talk to your doctor about the medicines that you take. Some may be unnecessary. Also, healthy lifestyle habits may lower the need for certain drugs. Last Reviewed: April 2010 Duarte Walsh MD   Updated: 4/13/2010   ·     [address]       As we get older, changes in balance, gait, strength, vision, hearing, and cognition make even the most youthful senior more prone to accidents. Falls are one of the leading health risks for older people. This increased risk of falling is related to:   Aging process (eg, decreased muscle strength, slowed reflexes)   Higher incidence of chronic health problems (eg, arthritis, diabetes) that may limit mobility, agility or sensory awareness   Side effects of medicine (eg, dizziness, blurred vision)especially medicines like prescription pain medicines and drugs used to treat mental health conditions   Depending on the brittleness of your bones, the consequences of a fall can be serious and long lasting. Home Life   Research by the Association of Aging [facility]) shows that some home accidents among older adults can be prevented by making simple lifestyle changes and basic modifications and repairs to the home environment. Here are some lifestyle changes that experts recommend:   Have your hearing and vision checked regularly. Be sure to wear prescription glasses that are right for you. Speak to your doctor or pharmacist about the possible side effects of your medicines. A number of medicines can cause dizziness.    If you have problems with sleep, talk to your doctor. Limit your intake of alcohol. If necessary, use a cane or walker to help maintain your balance. Wear supportive, rubber-soled shoes, even at home. If you live in a region that gets wintry weather, you may want to put special cleats on your shoes to prevent you from slipping on the snow and ice. Exercise regularly to help maintain muscle tone, agility, and balance. Always hold the banister when going up or down stairs. Also, use  bars when getting in or out of the bath or shower, or using the toilet. To avoid dizziness, get up slowly from a lying down position. Sit up first, dangling your legs for a minute or two before rising to a standing position. Overall Home Safety Check   According to the Consumer [employer] Safety [employer]'s \"Older Consumer Home Safety Checklist,\" it is important to check for potential hazards in each room. And remember, proper lighting is an essential factor in home safety. If you cannot see clearly, you are more likely to fall. Important questions to ask yourself include:   Are lamp, electric, extension, and telephone cords placed out of the flow of traffic and maintained in good condition? Have frayed cords been replaced? Are all small rugs and runners slip resistant? If not, you can secure them to the floor with a special double-sided carpet tape. Are smoke detectors properly locatedone on every floor of your home and one outside of every sleeping area? Are they in good working order? Are batteries replaced at least once a year? Do you have a well-maintained carbon monoxide detector outside every sleeping are in your home? Does your furniture layout leave plenty of space to maneuver between and around chairs, tables, beds, and sofas? Are hallways, stairs and passages between rooms well lit? Can you reach a lamp without getting out of bed? Are floor surfaces well maintained?  Shag rugs, high-pile carpeting, tile floors, and polished wood floors can be particularly slippery. Stairs should always have handrails and be carpeted or fitted with a non-skid tread. Is your telephone easily reachable. Is the cord safely tucked away? Room by Room   According to the Association of Aging, bathrooms and sam are the two most potentially hazardous rooms in your home. In the Kitchen    Be sure your stove is in proper working order and always make sure burners and the oven are off before you go out or go to sleep. Keep pots on the back burners, turn handles away from the front of the stove, and keep stove clean and free of grease build-up. Kitchen ventilation systems and range exhausts should be working properly. Keep flammable objects such as towels and pot holders away from the cooking area except when in use. Make sure kitchen curtains are tied back. Move cords and appliances away from the sink and hot surfaces. If extension cords are needed, install wiring guides so they do not hang over the sink, range, or working areas. Look for coffee pots, kettles and toaster ovens with automatic shut-offs. Keep a mop handy in the kitchen so you can wipe up spills instantly. You should also have a small fire extinguisher. Arrange your kitchen with frequently used items on lower shelves to avoid the need to stand on a stepstool to reach them. Make sure countertops are well-lit to avoid injuries while cutting and preparing food. In the Bathroom    Use a non-slip mat or decals in the tub and shower, since wet, soapy tile or porcelain surfaces are extremely slippery. Make sure bathroom rugs are non-skid or tape them firmly to the floor. Bathtubs should have at least one, preferably two, grab bars, firmly attached to structural supports in the wall. (Do not use built-in soap holders or glass shower doors as grab bars.)    Tub seats fitted with non-slip material on the legs allow you to wash sitting down.  For people with limited mobility, bathtub transfer benches allow you to slide safely into the tub. Raised toilet seats and toilet safety rails are helpful for those with knee or hip problems. In the [facility]    Make sure you use a nightlight and that the area around your bed is clear of potential obstacles. Be careful with electric blankets and never go to sleep with a heating pad, which can cause serious burns even if on a low setting. Use fire-resistant mattress covers and pillows, and NEVER smoke in bed. Keep a phone next to the bed that is programmed to dial 911 at the push of a button. If you have a chronic condition, you may want to sign on with an automatic call-in service. Typically the system includes a small pendant that connects directly to an emergency medical voice-response system. You should also make arrangements to stay in contact with someonefriend, neighbor, family memberon a regular schedule. Fire Prevention   According to the [employer]. (Smoke Alarms for Every) [address] [place] [address], senior citizens are one of the two highest risk groups for death and serious injuries due to residential fires. When cooking, wear short-sleeved items, never a bulky long-sleeved robe. The [facility]'s Safety Checklist for Older Consumers emphasizes the importance of checking basements, garages, workshops and storage areas for fire hazards, such as volatile liquids, piles of old rags or clothing and overloaded circuits. Never smoke in bed or when lying down on a couch or recliner chair. Small portable electric or kerosene heaters are responsible for many home fires and should be used cautiously if at all. If you do use one, be sure to keep them away from flammable materials. In case of fire, make sure you have a pre-established emergency exit plan. Have a professional check your fireplace and other fuel-burning appliances yearly.     Helping Hands   Baby boomers entering the [place] years will continue to see the development of new products to help older adults live safely and independently in spite of age-related changes. Making Life More Livable  , by [employer], lists over 1,000 products for \"living well in the mature years,\" such as bathing and mobility aids, household security devices, ergonomically designed knives and peelers, and faucet valves and knobs for temperature control. Medical supply stores and organizations are good sources of information about products that improve your quality of life and insure your safety. Last Reviewed: November 2009 Brooks Sanchez MD   Updated: 3/7/2011     ·        Advance Care Planning: Care Instructions  Your Care Instructions     It can be hard to live with an illness that cannot be cured. But if your health is getting worse, you may want to make decisions about end-of-life care. Planning for the end of your life does not mean that you are giving up. It is a way to make sure that your wishes are met. Clearly stating your wishes can make it easier for your loved ones. Making plans while you are still able may also ease your mind and make your final days less stressful and more meaningful. Follow-up care is a key part of your treatment and safety. Be sure to make and go to all appointments, and call your doctor if you are having problems. It's also a good idea to know your test results and keep a list of the medicines you take. What can you do to plan for the end of life? You can bring these issues up with your doctor. You do not need to wait until your doctor starts the conversation. You might start with, \"What makes life worth living for me is. Jean Jackson \" And then follow it with, \"I would not be willing to live with . [place] Jean Renetta Jean Renetta \" When you complete this sentence it helps your doctor understand your wishes. Talk openly and honestly with your doctor. This is the best way to understand the decisions you will need to make as your health changes.  Know that you can always change your mind.  Ask your doctor about commonly used life-support measures. These include tube feedings, breathing machines, and fluids given through a vein (IV). Understanding these treatments will help you decide whether you want them. You may choose to have these life-supporting treatments for a limited time. This allows a trial period to see whether they will help you. You may also decide that you want your doctor to take only certain measures to keep you alive. It may help to think about the big picture, like what makes life worth living for you or what your values and goals are. Talk to your doctor about how long you are likely to live. Your doctor may be able to give you an idea of what usually happens with your specific illness. Think about preparing papers that state your wishes. These papers are called advance directives. If you do this early and review them often, there will not be any confusion about what you want. You can change your instructions at any time. Which papers should you prepare? Advance directives are legal papers that tell doctors how you want to be cared for at the end of your life. You do not need a  to write these papers. Ask your doctor or your state [facility] department for information on how to write your advance directives. They may have the forms for each of these types of papers. Make sure your doctor has a copy of these on file, and give a copy to a family member or close friend. Consider a do-not-resuscitate order (DNR). This order asks that no extra treatments be done if your heart stops or you stop breathing. Extra treatments may include cardiopulmonary resuscitation (CPR), electrical shock to restart your heart, or a machine to breathe for you. If you decide to have a DNR order, ask your doctor to explain and write it. Place the order in your home where everyone can easily see it. Consider a living will.  A living will explains your wishes about life support and other treatments at the end of your life if you become unable to speak for yourself. Living chatterjee tell doctors to use or not use treatments that would keep you alive. You must have one or two witnesses or a notary present when you sign this form. A living will may be called something else in your state. Consider a medical power of . This form allows you to name a person to make decisions about your care if you are not able to. Most people ask a close friend or family member. Talk to this person about the kinds of treatments you want and those that you do not want. Make sure this person understands your wishes. A medical power of  may be called something else in your state. These legal papers are simple to change. Tell your doctor what you want to change, and ask him or her to make a note in your medical file. Give your family updated copies of the papers. Where can you learn more? Go to https://Nimble CRMpeStreetFire.RetiDiag. org and sign in to your [employer] account. Enter P184 in the [employer] box to learn more about \"Advance Care Planning: Care Instructions. \"     If you do not have an account, please click on the \"Sign Up Now\" link. Current as of: March 17, 2021               Content Version: 13.1  © 7953-4147 [employer]. Care instructions adapted under license by [employer]. If you have questions about a medical condition or this instruction, always ask your healthcare professional. [place] [address] any warranty or liability for your use of this information. ·        Learning About Living Gary Guadalupe  What is a living will? A living will, also called a declaration, is a legal form. It tells your family and your doctor your wishes when you can't speak for yourself. It's used by the health professionals who will treat you as you near the end of your life or if you get seriously hurt or ill.   If you put your wishes in writing, your loved ones and others wishes even if you have a form from a different state. You might use a universal form that has been approved by many states. This kind of form can sometimes be filled out and stored online. Your digital copy will then be available wherever you have a connection to the internet. The doctors and nurses who need to treat you can find it right away. Your state may offer an online registry. This is another place where you can store your living will online. It's a good idea to get your living will notarized. This means using a person called a [employer] to watch two people sign, or witness, your living will. What should you know when you create a living will? Here are some questions to ask yourself as you make your living will:  Do you know enough about life support methods that might be used? If not, talk to your doctor so you know what might be done if you can't breathe on your own, your heart stops, or you can't swallow. What things would you still want to be able to do after you receive life-support methods? Would you want to be able to walk? To speak? To eat on your own? To live without the help of machines? Do you want certain Lutheran practices performed if you become very ill? If you have a choice, where do you want to be cared for? In your home? At a hospital or nursing home? If you have a choice at the end of your life, where would you prefer to die? At home? In a hospital or nursing home? Somewhere else? Would you prefer to be buried or cremated? Do you want your organs to be donated after you die? What should you do with your living will? Make sure that your family members and your health care agent have copies of your living will (also called a declaration). Give your doctor a copy of your living will. Ask him or her to keep it as part of your medical record. If you have more than one doctor, make sure that each one has a copy. Put a copy of your living will where it can be easily found. For example, some people may put a copy on their refrigerator door. If you are using a digital copy, be sure your doctor, family members, and health care agent know how to find and access it. Where can you learn more? Go to https://chpeaissatoueweb.CereScan. org and sign in to your [employer] account. Enter J410 in the [facility] box to learn more about \"Learning About Living [place]. \"     If you do not have an account, please click on the \"Sign Up Now\" link. Current as of: March 17, 2021               Content Version: 13.1  © 7890-9816 [employer]. Care instructions adapted under license by [facility] ([facility]). If you have questions about a medical condition or this instruction, always ask your healthcare professional. [address] any warranty or liability for your use of this information. ·        Learning About Medical Power of   What is a medical power of ? A medical power of , also called a durable power of  for health care, is one type of the legal forms called advance directives. It lets you name the person you want to make treatment decisions for you if you can't speak or decide for yourself. The person you choose is called your health care agent. This person is also called a health care proxy or health care surrogate. A medical power of  may be called something else in your state. How do you choose a health care agent? Choose your health care agent carefully. This person may or may not be a family member. Talk to the person before you make your final decision. Make sure he or she is comfortable with this responsibility. It's a good idea to choose someone who:  Is at least 25years old. Knows you well and understands what makes life meaningful for you. Understands your [place] and moral values. Will do what you want, not what he or she wants. Will be able to make difficult choices at a stressful time.   Will be able to refuse or stop treatment, if that is what you would want, even if you could die. Will be firm and confident with health professionals if needed. Will ask questions to get needed information. Lives near you or agrees to travel to you if needed. Your family may help you make medical decisions while you can still be part of that process. But it's important to choose one person to be your health care agent in case you aren't able to make decisions for yourself. If you don't fill out the legal form and name a health care agent, the decisions your family can make may be limited. A health care agent may be called something else in your state. Who will make decisions for you if you don't have a health care agent? If you don't have a health care agent or a living will, you may not get the care you want. Decisions may be made by family members who disagree about your medical care. Or decisions may be made by a medical professional who doesn't know you well. In some cases, a  makes the decisions. When you name a health care agent, it is very clear who has the power to make health decisions for you. How do you name a health care agent? You name your health care agent on a legal form. This form is usually called a medical power of . Ask your hospital, state bar association, or office on aging where to find these forms. You must sign the form to make it legal. Some states require you to get the form notarized. This means that a person called a  watches you sign the form and then he or she signs the form. Some states also require that two or more witnesses sign the form. Be sure to tell your family members and doctors who your health care agent is. Where can you learn more? Go to https://ha.healthParametric. org and sign in to your [employer] account. Enter [id number] in the [employer] box to learn more about \"Learning About [address]. \"     If you do not have an account, please click on the \"Sign Up Now\" link. Current as of: March 17, 2021               Content Version: 13.1  © 2006-2021 Healthwise[employer]. Care instructions adapted under license by [facility] ([facility]). If you have questions about a medical condition or this instruction, always ask your healthcare professional. [address] any warranty or liability for your use of this information.     ·

## 2022-03-16 ENCOUNTER — TELEPHONE (OUTPATIENT)
Dept: FAMILY MEDICINE CLINIC | Age: 72
End: 2022-03-16

## 2022-03-16 NOTE — TELEPHONE ENCOUNTER
Please let the patient know that x-ray of the knee per radiology report suggested    No fracture dislocation. No abnormal fluid collections.   No overt soft tissue changes    Would consider referral to physical therapy and/or orthopedics if conservative treatment with home exercises ice and heat and compression do not help    Thanks

## 2022-03-18 LAB
25-HYDROXY VITAMIN D-3: 35 NG/ML (ref 30–100)
A/G RATIO: 1.7 RATIO (ref 1.1–2.2)
ALBUMIN SERPL-MCNC: 4.1 G/DL (ref 3.4–4.8)
ALP BLD-CCNC: 57 U/L (ref 42–121)
ALT SERPL-CCNC: 23 U/L (ref 10–63)
ANION GAP SERPL CALCULATED.3IONS-SCNC: 8 MEQ/L (ref 3–11)
APPEARANCE, BODY FLUID: NORMAL
AST SERPL-CCNC: 17 U/L (ref 10–41)
BASOPHILS ABSOLUTE: 0.1 K/UL (ref 0–0.2)
BASOPHILS RELATIVE PERCENT: 0.7 % (ref 0–1.5)
BILIRUB SERPL-MCNC: 0.9 MG/DL (ref 0.3–1.5)
BILIRUBIN URINE: NEGATIVE
BUN BLDV-MCNC: 24 MG/DL (ref 6–20)
CALCIUM SERPL-MCNC: 9 MG/DL (ref 8.5–10.5)
CHLORIDE BLD-SCNC: 104 MEQ/L (ref 98–107)
CHOLESTEROL: 171 MG/DL (ref 140–200)
CO2: 27 MEQ/L (ref 21–31)
COLOR, URINE: NORMAL
CREAT SERPL-MCNC: 1.2 MG/DL (ref 0.6–1.3)
CREATININE + EGFR PANEL: 72 ML/MIN
EOSINOPHILS ABSOLUTE: 0.2 K/UL (ref 0–0.33)
EOSINOPHILS RELATIVE PERCENT: 3 % (ref 0–3)
GFR NON-AFRICAN AMERICAN: 60 ML/MIN
GLOBULIN: 2.4 G/DL (ref 1.9–3.9)
GLUCOSE BLD-MCNC: 102 MG/DL (ref 70–99)
GLUCOSE URINE: NEGATIVE MG/DL
HCT VFR BLD CALC: 43.1 % (ref 41–50)
HDLC SERPL-MCNC: 31 MG/DL (ref 29–71)
HEMOGLOBIN: 14.9 G/DL (ref 13.5–16.5)
KETONES, URINE: NEGATIVE MG/DL
LDL CHOLESTEROL: 109 MG/DL
LDL/HDL RATIO: 3.5 RATIO
LYMPHOCYTES ABSOLUTE: 1.7 K/UL (ref 1.1–4.8)
LYMPHOCYTES RELATIVE PERCENT: 22 % (ref 24–44)
MAGNESIUM: 1.7 MEQ/L (ref 1.6–2.6)
MCH RBC QN AUTO: 31.6 PG (ref 28–34)
MCHC RBC AUTO-ENTMCNC: 34.5 G/DL (ref 33–37)
MCV RBC AUTO: 91.5 FL (ref 80–100)
MONOCYTES ABSOLUTE: 0.7 K/UL (ref 0.2–0.7)
MONOCYTES RELATIVE PERCENT: 9.5 % (ref 3.4–9)
NEUTROPHILS ABSOLUTE: 5.1 K/UL (ref 1.83–8.7)
NITRATE, UA: NEGATIVE
PDW BLD-RTO: 13 % (ref 10.9–14.3)
PH UA: 5 (ref 4.6–8)
PLATELET # BLD: 200 K/UL (ref 150–450)
PMV BLD AUTO: 7.4 FL (ref 7.4–10.4)
POTASSIUM SERPL-SCNC: 4.7 MEQ/L (ref 3.6–5)
PROSTATE SPECIFIC ANTIGEN: 0.87 NG/ML (ref 0–4)
PROTEIN UA: NEGATIVE MG/DL
RBC # BLD: 4.71 M/UL (ref 4.5–5.5)
RBC URINE: NEGATIVE
SEGMENTED NEUTROPHILS RELATIVE PERCENT: 64.8 % (ref 40–74)
SODIUM BLD-SCNC: 139 MEQ/L (ref 135–145)
SPECIFIC GRAVITY, URINE: 1.03 (ref 1–1.03)
TOTAL PROTEIN: 6.5 G/DL (ref 5.9–7.8)
TRIGL SERPL-MCNC: 159 MG/DL (ref 41–189)
TSH SERPL DL<=0.05 MIU/L-ACNC: 0.89 UIU/ML (ref 0.34–5.6)
UROBILINOGEN, URINE: NEGATIVE MG/DL
VITAMIN B-12: 461 PG/ML (ref 180–914)
WBC # BLD: 7.9 K/UL (ref 4.5–11)
WBC URINE: NEGATIVE

## 2022-03-19 ENCOUNTER — TELEPHONE (OUTPATIENT)
Dept: PRIMARY CARE CLINIC | Age: 72
End: 2022-03-19

## 2022-03-19 DIAGNOSIS — E78.2 MIXED HYPERLIPIDEMIA: ICD-10-CM

## 2022-03-19 NOTE — TELEPHONE ENCOUNTER
Please let the patient know that blood work results showed    Cholesterol levels were still borderline elevated. HDL or good cholesterol was lower than recommended.   Could consider increase in dose of atorvastatin    Sugar was mildly elevated    BUN 1 measure of kidney function was slightly elevated which could be related dehydration    Other electrolytes, liver functions, and other kidney function values were normal    PSA for prostate was normal and unchanged when compared to previous    Vitamin D level was normal but on the low end of normal    Vitamin B12 level was normal    Thyroid level was normal    Urine analysis was normal    Blood counts were normal    Thanks

## 2022-03-21 RX ORDER — ATORVASTATIN CALCIUM 20 MG/1
20 TABLET, FILM COATED ORAL DAILY
Qty: 90 TABLET | Refills: 1 | Status: SHIPPED
Start: 2022-03-21 | End: 2022-08-02 | Stop reason: SDUPTHER

## 2022-03-21 NOTE — TELEPHONE ENCOUNTER
Requested Prescriptions     Signed Prescriptions Disp Refills    atorvastatin (LIPITOR) 20 MG tablet 90 tablet 1     Sig: Take 1 tablet by mouth daily     Authorizing Provider: Saint Budge     New medication dosage Lipitor changed from 10 mg to 20 mg 1 tablet daily sent to local pharmacy

## 2022-03-21 NOTE — TELEPHONE ENCOUNTER
Patient was informed and gave a verbal understanding. Patient stated that he would be okay with an increase with the atorvastatin. Patient requested that results be sent in the mail. Results were sent.

## 2022-03-25 LAB
6-ACETYLMORPHINE, UR: NORMAL
AMPHETAMINE SCREEN, URINE: NORMAL
BARBITURATE SCREEN, URINE: NORMAL
BENZODIAZEPINE SCREEN, URINE: NORMAL
BILIRUBIN, URINE: NORMAL
BLOOD, URINE: NORMAL
CANNABINOID SCREEN URINE: NORMAL
CLARITY: NORMAL
COCAINE METABOLITE, URINE: NORMAL
COLOR: NORMAL
CREATININE URINE: NORMAL
EDDP, URINE: NORMAL
ETHANOL URINE: NORMAL
GLUCOSE URINE: NORMAL
KETONES, URINE: NORMAL
LEUKOCYTE ESTERASE, URINE: NORMAL
MDMA URINE: NORMAL
METHADONE SCREEN, URINE: NORMAL
METHAMPHETAMINE, URINE: NORMAL
NITRITE, URINE: NORMAL
OPIATES, URINE: NORMAL
OXYCODONE: NORMAL
PCP: NORMAL
PH UA: NORMAL
PH, URINE: NORMAL
PHENCYCLIDINE, URINE: NORMAL
PROPOXYPHENE, URINE: NORMAL
PROSTATE SPECIFIC ANTIGEN: 0.55 NG/ML
PROTEIN UA: NORMAL
SPECIFIC GRAVITY, URINE: NORMAL
TRICYCLIC ANTIDEPRESSANTS, UR: NORMAL
UROBILINOGEN, URINE: NORMAL

## 2022-05-02 DIAGNOSIS — F34.1 DYSTHYMIA: ICD-10-CM

## 2022-05-02 RX ORDER — HYDROXYZINE HYDROCHLORIDE 25 MG/1
25 TABLET, FILM COATED ORAL EVERY 8 HOURS PRN
Qty: 30 TABLET | Refills: 1 | Status: SHIPPED | OUTPATIENT
Start: 2022-05-02 | End: 2022-06-01

## 2022-08-02 ENCOUNTER — OFFICE VISIT (OUTPATIENT)
Dept: FAMILY MEDICINE CLINIC | Age: 72
End: 2022-08-02
Payer: MEDICARE

## 2022-08-02 VITALS
DIASTOLIC BLOOD PRESSURE: 84 MMHG | WEIGHT: 221 LBS | SYSTOLIC BLOOD PRESSURE: 134 MMHG | RESPIRATION RATE: 20 BRPM | OXYGEN SATURATION: 95 % | BODY MASS INDEX: 32.73 KG/M2 | HEIGHT: 69 IN | TEMPERATURE: 98.8 F | HEART RATE: 82 BPM

## 2022-08-02 DIAGNOSIS — M81.8 OTHER OSTEOPOROSIS WITHOUT CURRENT PATHOLOGICAL FRACTURE: ICD-10-CM

## 2022-08-02 DIAGNOSIS — J45.21 MILD INTERMITTENT ASTHMA WITH EXACERBATION: ICD-10-CM

## 2022-08-02 DIAGNOSIS — I10 ESSENTIAL HYPERTENSION: ICD-10-CM

## 2022-08-02 DIAGNOSIS — R51.9 CHRONIC NONINTRACTABLE HEADACHE, UNSPECIFIED HEADACHE TYPE: ICD-10-CM

## 2022-08-02 DIAGNOSIS — N18.2 STAGE 2 CHRONIC KIDNEY DISEASE: ICD-10-CM

## 2022-08-02 DIAGNOSIS — G89.29 CHRONIC NONINTRACTABLE HEADACHE, UNSPECIFIED HEADACHE TYPE: ICD-10-CM

## 2022-08-02 DIAGNOSIS — F41.9 ANXIETY: ICD-10-CM

## 2022-08-02 DIAGNOSIS — R10.84 GENERALIZED ABDOMINAL PAIN: ICD-10-CM

## 2022-08-02 DIAGNOSIS — F34.1 DYSTHYMIA: ICD-10-CM

## 2022-08-02 DIAGNOSIS — E78.2 MIXED HYPERLIPIDEMIA: ICD-10-CM

## 2022-08-02 DIAGNOSIS — K29.70 GASTRITIS WITHOUT BLEEDING, UNSPECIFIED CHRONICITY, UNSPECIFIED GASTRITIS TYPE: Primary | ICD-10-CM

## 2022-08-02 DIAGNOSIS — M50.90 CERVICAL DISC DISEASE: ICD-10-CM

## 2022-08-02 DIAGNOSIS — K31.5 DUODENAL STRICTURE: ICD-10-CM

## 2022-08-02 PROCEDURE — G8427 DOCREV CUR MEDS BY ELIG CLIN: HCPCS | Performed by: INTERNAL MEDICINE

## 2022-08-02 PROCEDURE — 3017F COLORECTAL CA SCREEN DOC REV: CPT | Performed by: INTERNAL MEDICINE

## 2022-08-02 PROCEDURE — 1123F ACP DISCUSS/DSCN MKR DOCD: CPT | Performed by: INTERNAL MEDICINE

## 2022-08-02 PROCEDURE — G8417 CALC BMI ABV UP PARAM F/U: HCPCS | Performed by: INTERNAL MEDICINE

## 2022-08-02 PROCEDURE — 1036F TOBACCO NON-USER: CPT | Performed by: INTERNAL MEDICINE

## 2022-08-02 PROCEDURE — 99214 OFFICE O/P EST MOD 30 MIN: CPT | Performed by: INTERNAL MEDICINE

## 2022-08-02 RX ORDER — HYDROXYZINE HYDROCHLORIDE 25 MG/1
25 TABLET, FILM COATED ORAL 2 TIMES DAILY PRN
Qty: 30 TABLET | Refills: 5 | Status: SHIPPED | OUTPATIENT
Start: 2022-08-02

## 2022-08-02 RX ORDER — HYDROXYZINE HYDROCHLORIDE 25 MG/1
TABLET, FILM COATED ORAL
COMMUNITY
Start: 2022-06-07 | End: 2022-08-02 | Stop reason: SDUPTHER

## 2022-08-02 RX ORDER — AMLODIPINE BESYLATE 10 MG/1
10 TABLET ORAL EVERY MORNING
Qty: 90 TABLET | Refills: 3 | Status: SHIPPED | OUTPATIENT
Start: 2022-08-02

## 2022-08-02 RX ORDER — ATORVASTATIN CALCIUM 20 MG/1
20 TABLET, FILM COATED ORAL DAILY
Qty: 90 TABLET | Refills: 3 | Status: SHIPPED | OUTPATIENT
Start: 2022-08-02

## 2022-08-02 RX ORDER — OMEPRAZOLE 40 MG/1
40 CAPSULE, DELAYED RELEASE ORAL
Qty: 90 CAPSULE | Refills: 3 | Status: SHIPPED | OUTPATIENT
Start: 2022-08-02

## 2022-08-02 RX ORDER — FLUOXETINE HYDROCHLORIDE 40 MG/1
40 CAPSULE ORAL EVERY MORNING
Qty: 90 CAPSULE | Refills: 3 | Status: SHIPPED | OUTPATIENT
Start: 2022-08-02

## 2022-08-02 SDOH — ECONOMIC STABILITY: FOOD INSECURITY: WITHIN THE PAST 12 MONTHS, THE FOOD YOU BOUGHT JUST DIDN'T LAST AND YOU DIDN'T HAVE MONEY TO GET MORE.: PATIENT DECLINED

## 2022-08-02 SDOH — ECONOMIC STABILITY: FOOD INSECURITY: WITHIN THE PAST 12 MONTHS, YOU WORRIED THAT YOUR FOOD WOULD RUN OUT BEFORE YOU GOT MONEY TO BUY MORE.: PATIENT DECLINED

## 2022-08-02 ASSESSMENT — PATIENT HEALTH QUESTIONNAIRE - PHQ9
9. THOUGHTS THAT YOU WOULD BE BETTER OFF DEAD, OR OF HURTING YOURSELF: NOT AT ALL
4. FEELING TIRED OR HAVING LITTLE ENERGY: SEVERAL DAYS
SUM OF ALL RESPONSES TO PHQ9 QUESTIONS 1 & 2: 4
SUM OF ALL RESPONSES TO PHQ QUESTIONS 1-9: 6
3. TROUBLE FALLING OR STAYING ASLEEP: NOT AT ALL
7. TROUBLE CONCENTRATING ON THINGS, SUCH AS READING THE NEWSPAPER OR WATCHING TELEVISION: NOT AT ALL
8. MOVING OR SPEAKING SO SLOWLY THAT OTHER PEOPLE COULD HAVE NOTICED. OR THE OPPOSITE, BEING SO FIGETY OR RESTLESS THAT YOU HAVE BEEN MOVING AROUND A LOT MORE THAN USUAL: NOT AT ALL
6. FEELING BAD ABOUT YOURSELF - OR THAT YOU ARE A FAILURE OR HAVE LET YOURSELF OR YOUR FAMILY DOWN: SEVERAL DAYS
DEPRESSION UNABLE TO ASSESS: YES
2. FEELING DOWN, DEPRESSED OR HOPELESS: MORE THAN HALF THE DAYS
5. POOR APPETITE OR OVEREATING: NOT AT ALL
1. LITTLE INTEREST OR PLEASURE IN DOING THINGS: MORE THAN HALF THE DAYS

## 2022-08-02 ASSESSMENT — ENCOUNTER SYMPTOMS
SHORTNESS OF BREATH: 0
CHEST TIGHTNESS: 0
DIARRHEA: 0
BLOOD IN STOOL: 0
EYE PAIN: 0
VOMITING: 0
RHINORRHEA: 0
ABDOMINAL PAIN: 1
CONSTIPATION: 0
COUGH: 0
SORE THROAT: 0
NAUSEA: 0

## 2022-08-02 ASSESSMENT — SOCIAL DETERMINANTS OF HEALTH (SDOH): HOW HARD IS IT FOR YOU TO PAY FOR THE VERY BASICS LIKE FOOD, HOUSING, MEDICAL CARE, AND HEATING?: PATIENT DECLINED

## 2022-08-02 NOTE — PROGRESS NOTES
[facility]) Physicians   Internal Medicine     2022  Catherine Nageotte : 1950 Sex: male  Age:72 y.o. Chief Complaint   Patient presents with    Follow-up    Cholesterol Problem    Hypertension    Depression    Anxiety        HPI:   Patient presents to office for evaluation of the following medical concerns. - States pain to left knee. States fell and landed on knee while walking dog in snow 1 month ago. States still with pain. Improved     - States having generalized abdominal pain, has improved and stable. Found duodenal stricture. States has had EGD and colonoscopy (2020)- showed gastritis and duodenal stricture, had follow up EGD and had dilation (2020)  - referred to endo surgery. Had EGD with EUS (10/20) - Duodenal stricture just distal to the duodenal bulb path - blood with few benign glandular cells. States some left sided soreness. On omeprazole. No nausea, no vomiting. No diarrhea or constipation. No melena or hematochezia. - States has hypertension. States does check blood pressure at home, blood pressure 120-130's/70-80. Occasional in 140's. On amlodipine, lisinopril and atenolol. No reported side effects.     - Has high cholesterol. Trying to watch diet. On atorvastatin. No reported side effects.     - States has depression. On Prozac 40mg - helping. States still having depression, given medical issues and family issues. States no suicidal thoughts. Discussed counseling - declines. States had increased prozac to 60mg but had diarrhea. Last PHQ score of 5 (3/15/2022). States having panic issues. States has been having panic issues. States added hydroxyzine and takes daily at bedtime.     - States had right detached retina. States following with specialist. Using eye drops at moment. States s/p repair (2019) and cataract repair.     - Bone density scan showed osteoporosis of hip. Was on fosamax. States stopped - due to stomach pain. Has issues with reclast with stomach. Discussed other option in treatment - different bisphosphonate or prolia. Declines. - States has migraines. Up and down. Still asking about stadol. States has seen neurology - not improved - tried Topamax and Imitrex. Did not tolerateTopamax, Stopped amitriptyline. Taking butalbital/aspirin/caffeine.    - States has asthma and stable with inhaler. States SOB is better. - State pain to bilateral shoulders. States had right shoulder injection per ortho (2018). States had MRI on right, shows partial tears, fluid and degenerative changes.  [facility] - [facility]    -  has seen chronic pain management, not currently following.  has had injections to right neck for right shoulder x 3 and States had Radio frequency ablation x 1.  has helped b/l shoulder pain.  has had MRI on cervical spine through [facility] - multiple osteophytes, disc disease and previous c5-6 fusion.    - States still has lower back pain. Unchanged. No recent trauma or injury. No bowel or bladder incontinence. No fever or chills. No numbness, weakness. States pain as sharp shooting. No radicular pain.     - [facility] (3/22) -CBC negative CMP negative cholesterol 154  triglyceride 151 HDL 30 PSA 0.55 UA negative reviewed with patient 8/2/2022      Health Maintenance   - immunizations:   Influenza Vaccination  - (9/14/2015) , (2017), (2019), (2020)   Pneumonia Vaccination - (10/2016) - prevnar, (10/2017) - pneumococcal 23  Zoster/Shingles Vaccine - (2020) x 2   Tetanus Vaccination  - (2015) - [facility]  covid (1/2021) #1, (2/21/2021) #2, (10/20/2021)#3  (7/17/2022) #4[facility] pfizer     - Screenings:   Bone Density Test Screening - (10/25/2018)  Colonoscopy - (2014), (1/2020) - Minimal diverticulosis, sigmoid polyp x2 s/p polypectomy 3 to 4 mm, hemrrhoid, path - tubular and hyperplastic, follow-up in 2-3 years  Prostate     Couseled on Home Safety - (11/13/2017)    carotid ultrasound - (3/2015) - no significant stenosis    EGD - (1/2020) - Gastritis and gastric ulcer status post biopsy, duodenitis, Difficult to advance around the duodenal sweep due to apparent benign-appearing stricturing, poss due to ulcer - order upper GI , Upper GI confirm stricture, (6/20) duodenal stricture s/p dilation, refer to endosurger    ROS:  Review of Systems   Constitutional:  Negative for appetite change, chills, fever and unexpected weight change. HENT:  Negative for congestion, rhinorrhea and sore throat. Eyes:  Positive for visual disturbance. Negative for pain. Respiratory:  Negative for cough, chest tightness and shortness of breath. Cardiovascular:  Negative for chest pain and palpitations. Gastrointestinal:  Positive for abdominal pain. Negative for blood in stool, constipation, diarrhea, nausea and vomiting. Genitourinary:  Negative for difficulty urinating, dysuria, hematuria, scrotal swelling, testicular pain and urgency. Musculoskeletal:  Negative for arthralgias and gait problem. Skin:  Negative for rash. Neurological:  Positive for headaches. Negative for dizziness, syncope, weakness and light-headedness. Hematological:  Negative for adenopathy. Does not bruise/bleed easily. Psychiatric/Behavioral:  Negative for suicidal ideas. The patient is not nervous/anxious.         Current Outpatient Medications:     amLODIPine (NORVASC) 10 MG tablet, Take 1 tablet by mouth every morning, Disp: 90 tablet, Rfl: 3    atorvastatin (LIPITOR) 20 MG tablet, Take 1 tablet by mouth in the morning., Disp: 90 tablet, Rfl: 3    FLUoxetine (PROZAC) 40 MG capsule, Take 1 capsule by mouth every morning, Disp: 90 capsule, Rfl: 3    omeprazole (PRILOSEC) 40 MG delayed release capsule, Take 1 capsule by mouth every morning (before breakfast), Disp: 90 capsule, Rfl: 3    hydrOXYzine HCl (ATARAX) 25 MG tablet, Take 1 tablet by mouth 2 times daily as needed for Itching, Disp: 30 tablet, Rfl: 5    [employer] GARLIC PO, Take 10 mg by mouth daily, Disp: , Rfl:     MULTIPLE VITAMIN PO, daily Ld 6/15/2020, Disp: , Rfl:     butalbital-acetaminophen-caffeine (FIORICET, ESGIC) -40 MG per tablet, Take 1 tablet by mouth every 4 hours as needed for Headaches, Disp: , Rfl:     niacin 500 MG extended release capsule, Take 500 mg by mouth nightly Ld 6/15/2020, Disp: , Rfl:     aspirin 81 MG tablet, Take 81 mg by mouth daily , Disp: , Rfl:     albuterol sulfate HFA (PROVENTIL;VENTOLIN;PROAIR) 108 (90 Base) MCG/ACT inhaler, Inhale into the lungs every 4 hours as needed Instructed to take am of procedure if needed and bring dos, Disp: , Rfl:     oxyCODONE-acetaminophen (PERCOCET) 5-325 MG per tablet, Take 1 tablet by mouth 2 times daily.  Instructed to take am of procedure, Disp: , Rfl:     lisinopril (PRINIVIL;ZESTRIL) 20 MG tablet, Take 20 mg by mouth 2 times daily , Disp: , Rfl:     atenolol (TENORMIN) 100 MG tablet, Take 100 mg by mouth every morning , Disp: , Rfl:     No Known Allergies    Past Medical History:   Diagnosis Date    Asthma     Depression     GERD (gastroesophageal reflux disease)     Hyperlipidemia     Hypertension     Migraine     Osteoporosis     Right retinal detachment 04/2019       Past Surgical History:   Procedure Laterality Date    CATARACT REMOVAL WITH IMPLANT Right     COLONOSCOPY N/A 1/14/2020    COLONOSCOPY WITH BIOPSY performed by Mina Bolton MD at [facility] Right 4/25/2019    RIGHT EYE RETINAL DETACHMENT REPAIR, PARS PLANA VITRECTOMY 25 GAUGE, ENDO LASER GAS FLUID EXCHANGE (SF6)++LATEX ALLERGY++ performed by Karolina Haynes MD at [address] [facility] N/A 1/14/2020    EGD BIOPSY performed by Mina Bolton MD at [address] [facility] N/A 6/18/2020    EGD DILATION BALLOON performed by Mina Bolton MD at [facility] UPPER GASTROINTESTINAL ENDOSCOPY N/A 10/23/2020    ENDOSCOPIC EGD ULTRASOUND performed by Morro Emerson MD at [address]  10/23/2020    EGD DILATION BALLOON performed by Morro Emerson MD at [address]  10/23/2020    EGD BIOPSY performed by Morro Emerson MD at [address] [place] [address]         No family history on file. Social History     Socioeconomic History    Marital status:      Spouse name: Not on file    Number of children: Not on file    Years of education: Not on file    Highest education level: Not on file   Occupational History    Not on file   Tobacco Use    Smoking status: Never    Smokeless tobacco: Never   Vaping Use    Vaping Use: Never used   Substance and Sexual Activity    Alcohol use: Yes     Comment: occ     Drug use: No    Sexual activity: Not on file   Other Topics Concern    Not on file   Social History Narrative    Not on file     Social Determinants of Health     Financial Resource Strain: Unknown    Difficulty of Paying Living Expenses: Patient refused   Food Insecurity: Unknown    Worried About Running Out of Food in the Last Year: Patient refused    Ran Out of Food in the Last Year: Patient refused   Transportation Needs: Not on file   Physical Activity: Sufficiently Active    Days of Exercise per Week: 7 days    Minutes of Exercise per Session: 30 min   Stress: Not on file   Social Connections: Not on file   Intimate Partner Violence: Not on file   Housing Stability: Not on file       Vitals:    08/02/22 1402   BP: 134/84   Site: Left Upper Arm   Position: Sitting   Cuff Size: Large Adult   Pulse: 82   Resp: 20   Temp: 98.8 °F (37.1 °C)   TempSrc: Temporal   SpO2: 95%   Weight: 221 lb (100.2 kg)   Height: 5' 9\" (1.753 m)       Exam:  Physical Exam  Constitutional:       Appearance: He is well-developed. HENT:      Head: Normocephalic and atraumatic.       Right Ear: External ear normal.      Left Ear: External ear normal.   Eyes:      Pupils: Pupils are equal, round, and reactive to light. Neck:      Thyroid: No thyromegaly. Cardiovascular:      Rate and Rhythm: Normal rate and regular rhythm. Heart sounds: Normal heart sounds. No murmur heard. Pulmonary:      Effort: Pulmonary effort is normal.      Breath sounds: Normal breath sounds. No wheezing or rales. Abdominal:      General: Bowel sounds are normal.      Palpations: Abdomen is soft. Tenderness: There is generalized abdominal tenderness. There is no guarding or rebound. Musculoskeletal:      Right shoulder: Decreased range of motion. Left shoulder: Decreased range of motion. Cervical back: Normal range of motion and neck supple. Lumbar back: Decreased range of motion. Lymphadenopathy:      Cervical: No cervical adenopathy. Skin:     General: Skin is warm and dry. Neurological:      Mental Status: He is alert and oriented to person, place, and time. Cranial Nerves: No cranial nerve deficit.    Psychiatric:         Judgment: Judgment normal.       Assessment and Plan:    Diagnoses and all orders for this visit:    Gastritis without bleeding, unspecified chronicity, unspecified gastritis type  - on omeprazole   - following with surgery  - last EGD (6/2020) which showed healing    - improved with PPI     Duodenal stricture  - on omeprazole   - following with surgery  - EGD (6/2020) which showed healing   - had EGD with EUS (10/2020) s/p dilation   - follow up in 1 year     Essential hypertension  - monitor blood pressure at home  - watch diet  - on amlodipine   - on atenolol   - on lisinopril   - stable     Mixed hyperlipidemia  - watch diet  - on atrovastatin   - follow labs   - stable per labs   - lst lab (2/2021)     Dysthymia  - referral to counseling - declined  - did not tolerate zoloft  - off cymbalta  - on prozac 40mg  - did not tolerate increase of prozac to 60mg   - PHQ score of 5 (3/15/2022)   - add hydroxyzine 25mg prn - side effects reviewed     Stage 2 chronic kidney disease  - stopped meloxicam  - avoid NSAID  - recheck labs    Nontraumatic incomplete tear of right rotator cuff  - has been following with pain management - injections   - had abnormality of humerus on right, MRI 2018   - [facility]   - Stable     Cervical disc disease  - previous c5-6 surgery  - weakness of forearm  - Stable and unchanged     Chronic nonintractable headache, unspecified headache type  - continue present treatment  - not currently following with neurology  - did not tolerate Topamax  - off amitriptyline  - has asked for stadol - discussed not recommended  - on fiorcet     Chronic pain disorder  - no longer following with pain management - do not accept insurance  - has had injections - poss RFA  - has asked for stadol for migraine    Chronic bilateral low back pain without sciatica  - no longer following with pain management  - s/p RFA and injections - uncertain benefit     Other osteoporosis without current pathological fracture  - DEXA (11/2018)  - started fosamax - had to stop due to stomach pain, discussed trying another type, switch to prolia or trying reclast infusion.   - had reclast      Adenomatous polyp of colon, unspecified part of colon  - last colonoscopy (1/2020)   - follow up in 2-3 years     Acute pain of left knee  - poss strain and sprain from fall   - will check xray   - compression, rest, ice   - home exercises   - if not better ortho - declines at present      Return in about 6 months (around 2/2/2023) for check up and review and labs. No orders of the defined types were placed in this encounter. Requested Prescriptions     Signed Prescriptions Disp Refills    amLODIPine (NORVASC) 10 MG tablet 90 tablet 3     Sig: Take 1 tablet by mouth every morning    atorvastatin (LIPITOR) 20 MG tablet 90 tablet 3     Sig: Take 1 tablet by mouth in the morning. FLUoxetine (PROZAC) 40 MG capsule 90 capsule 3     Sig: Take 1 capsule by mouth every morning    omeprazole (PRILOSEC) 40 MG delayed release capsule 90 capsule 3     Sig: Take 1 capsule by mouth every morning (before breakfast)    hydrOXYzine HCl (ATARAX) 25 MG tablet 30 tablet 5     Sig: Take 1 tablet by mouth 2 times daily as needed for Itching     Daily Howell MD  8/2/2022  3:04 PM

## 2022-10-12 ENCOUNTER — TELEPHONE (OUTPATIENT)
Dept: FAMILY MEDICINE CLINIC | Age: 72
End: 2022-10-12

## 2022-10-12 NOTE — TELEPHONE ENCOUNTER
Spouse calling in his mother passed away on 09/25 and he did not find out til 10/02/22. He is very withdrawn and having a vary hard time. He currently takes prozac but it is not enough right now. She states he has taken wellbutrin in the past. Are you able to order another med for him?

## 2022-10-12 NOTE — TELEPHONE ENCOUNTER
I think which may be better is to see if he could be seen by counseling and/or psychiatry on a more urgent level because I think it is going to take more than medication to help him at this point and I would recommend referral for that ASAP    Recommend evaluation in the emergency room if symptoms worsen or any abnormal thoughts both either suicidal or other    Adding medication may take weeks to have an effect and although that might be something we need to look at I think seeing psychology psychiatry may have more of a immediate impact

## 2022-11-23 RX ORDER — HYDROXYZINE HYDROCHLORIDE 25 MG/1
25 TABLET, FILM COATED ORAL 2 TIMES DAILY PRN
Qty: 60 TABLET | Refills: 11 | Status: SHIPPED | OUTPATIENT
Start: 2022-11-23

## 2022-11-23 NOTE — TELEPHONE ENCOUNTER
Patient is taking hydroxyzine bid and not bid prn. Has used all refills. Was given #30 w/ 5 refills 8/2/22. Can you send #60 w/ refills [employer]     Patient has a 5 day supply on hand.     Last Appointment:  8/2/2022  Future Appointments   Date Time Provider Xiao Ty   2/9/2023  1:00 PM Tonya Syed  [address] [place] [address]

## 2022-11-28 LAB
6-ACETYLMORPHINE, UR: NORMAL
AMPHETAMINE SCREEN, URINE: NORMAL
BARBITURATE SCREEN, URINE: NORMAL
BENZODIAZEPINE SCREEN, URINE: NORMAL
BILIRUBIN, URINE: NORMAL
BLOOD, URINE: NORMAL
CANNABINOID SCREEN URINE: NORMAL
CLARITY: NORMAL
COCAINE METABOLITE, URINE: NORMAL
COLOR: NORMAL
CREATININE URINE: NORMAL
EDDP, URINE: NORMAL
ETHANOL URINE: NORMAL
GLUCOSE URINE: NORMAL
KETONES, URINE: NORMAL
LEUKOCYTE ESTERASE, URINE: NORMAL
MDMA URINE: NORMAL
METHADONE SCREEN, URINE: NORMAL
METHAMPHETAMINE, URINE: NORMAL
NITRITE, URINE: NORMAL
OPIATES, URINE: NORMAL
OXYCODONE: NORMAL
PCP: NORMAL
PH UA: NORMAL
PH, URINE: NORMAL
PHENCYCLIDINE, URINE: NORMAL
PROPOXYPHENE, URINE: NORMAL
PROSTATE SPECIFIC ANTIGEN: 0.59 NG/ML
PROTEIN UA: NORMAL
SPECIFIC GRAVITY, URINE: NORMAL
TRICYCLIC ANTIDEPRESSANTS, UR: NORMAL
UROBILINOGEN, URINE: NORMAL

## 2022-12-13 NOTE — TELEPHONE ENCOUNTER
Detail Level: Detailed
Last Appointment:  3/15/2022  Future Appointments   Date Time Provider Xiao Ty   6/15/2022 10:00 AM Maryan Coronel  [address] [place] [address]
Add [address] Cpt? (Important Note: In 2017 The Use Of 52857 Is Being Tracked By Cms To Determine Future Global Period Reimbursement For Global Periods): no

## 2023-02-09 ENCOUNTER — OFFICE VISIT (OUTPATIENT)
Dept: FAMILY MEDICINE CLINIC | Age: 73
End: 2023-02-09

## 2023-02-09 VITALS
TEMPERATURE: 98.2 F | DIASTOLIC BLOOD PRESSURE: 70 MMHG | RESPIRATION RATE: 18 BRPM | BODY MASS INDEX: 33.33 KG/M2 | WEIGHT: 225 LBS | SYSTOLIC BLOOD PRESSURE: 126 MMHG | HEART RATE: 69 BPM | HEIGHT: 69 IN | OXYGEN SATURATION: 94 %

## 2023-02-09 DIAGNOSIS — G89.4 CHRONIC PAIN DISORDER: ICD-10-CM

## 2023-02-09 DIAGNOSIS — I10 ESSENTIAL HYPERTENSION: ICD-10-CM

## 2023-02-09 DIAGNOSIS — K31.5 DUODENAL STRICTURE: ICD-10-CM

## 2023-02-09 DIAGNOSIS — M75.111 NONTRAUMATIC INCOMPLETE TEAR OF RIGHT ROTATOR CUFF: ICD-10-CM

## 2023-02-09 DIAGNOSIS — M81.8 IDIOPATHIC OSTEOPOROSIS: ICD-10-CM

## 2023-02-09 DIAGNOSIS — E78.2 MIXED HYPERLIPIDEMIA: ICD-10-CM

## 2023-02-09 DIAGNOSIS — F34.1 DYSTHYMIA: ICD-10-CM

## 2023-02-09 DIAGNOSIS — K29.70 GASTRITIS WITHOUT BLEEDING, UNSPECIFIED CHRONICITY, UNSPECIFIED GASTRITIS TYPE: Primary | ICD-10-CM

## 2023-02-09 DIAGNOSIS — N18.2 STAGE 2 CHRONIC KIDNEY DISEASE: ICD-10-CM

## 2023-02-09 DIAGNOSIS — M81.8 OTHER OSTEOPOROSIS WITHOUT CURRENT PATHOLOGICAL FRACTURE: ICD-10-CM

## 2023-02-09 DIAGNOSIS — D12.6 ADENOMATOUS POLYP OF COLON, UNSPECIFIED PART OF COLON: ICD-10-CM

## 2023-02-09 DIAGNOSIS — G89.29 CHRONIC BILATERAL LOW BACK PAIN WITHOUT SCIATICA: ICD-10-CM

## 2023-02-09 DIAGNOSIS — R51.9 CHRONIC NONINTRACTABLE HEADACHE, UNSPECIFIED HEADACHE TYPE: ICD-10-CM

## 2023-02-09 DIAGNOSIS — M54.50 CHRONIC BILATERAL LOW BACK PAIN WITHOUT SCIATICA: ICD-10-CM

## 2023-02-09 DIAGNOSIS — G89.29 CHRONIC NONINTRACTABLE HEADACHE, UNSPECIFIED HEADACHE TYPE: ICD-10-CM

## 2023-02-09 SDOH — ECONOMIC STABILITY: FOOD INSECURITY: WITHIN THE PAST 12 MONTHS, THE FOOD YOU BOUGHT JUST DIDN'T LAST AND YOU DIDN'T HAVE MONEY TO GET MORE.: PATIENT DECLINED

## 2023-02-09 SDOH — ECONOMIC STABILITY: INCOME INSECURITY: HOW HARD IS IT FOR YOU TO PAY FOR THE VERY BASICS LIKE FOOD, HOUSING, MEDICAL CARE, AND HEATING?: PATIENT DECLINED

## 2023-02-09 SDOH — ECONOMIC STABILITY: FOOD INSECURITY: WITHIN THE PAST 12 MONTHS, YOU WORRIED THAT YOUR FOOD WOULD RUN OUT BEFORE YOU GOT MONEY TO BUY MORE.: PATIENT DECLINED

## 2023-02-09 SDOH — ECONOMIC STABILITY: HOUSING INSECURITY
IN THE LAST 12 MONTHS, WAS THERE A TIME WHEN YOU DID NOT HAVE A STEADY PLACE TO SLEEP OR SLEPT IN A SHELTER (INCLUDING NOW)?: PATIENT REFUSED

## 2023-02-09 ASSESSMENT — ENCOUNTER SYMPTOMS
CONSTIPATION: 0
SORE THROAT: 0
RHINORRHEA: 0
BLOOD IN STOOL: 0
CHEST TIGHTNESS: 0
COUGH: 0
NAUSEA: 0
ABDOMINAL PAIN: 1
EYE PAIN: 0
DIARRHEA: 0
VOMITING: 0
SHORTNESS OF BREATH: 0

## 2023-02-09 ASSESSMENT — PATIENT HEALTH QUESTIONNAIRE - PHQ9
2. FEELING DOWN, DEPRESSED OR HOPELESS: 2
7. TROUBLE CONCENTRATING ON THINGS, SUCH AS READING THE NEWSPAPER OR WATCHING TELEVISION: 0
4. FEELING TIRED OR HAVING LITTLE ENERGY: 1
6. FEELING BAD ABOUT YOURSELF - OR THAT YOU ARE A FAILURE OR HAVE LET YOURSELF OR YOUR FAMILY DOWN: 1
8. MOVING OR SPEAKING SO SLOWLY THAT OTHER PEOPLE COULD HAVE NOTICED. OR THE OPPOSITE, BEING SO FIGETY OR RESTLESS THAT YOU HAVE BEEN MOVING AROUND A LOT MORE THAN USUAL: 0
SUM OF ALL RESPONSES TO PHQ QUESTIONS 1-9: 5
9. THOUGHTS THAT YOU WOULD BE BETTER OFF DEAD, OR OF HURTING YOURSELF: 0
SUM OF ALL RESPONSES TO PHQ9 QUESTIONS 1 & 2: 3
SUM OF ALL RESPONSES TO PHQ QUESTIONS 1-9: 5
SUM OF ALL RESPONSES TO PHQ QUESTIONS 1-9: 5
3. TROUBLE FALLING OR STAYING ASLEEP: 0
1. LITTLE INTEREST OR PLEASURE IN DOING THINGS: 1
10. IF YOU CHECKED OFF ANY PROBLEMS, HOW DIFFICULT HAVE THESE PROBLEMS MADE IT FOR YOU TO DO YOUR WORK, TAKE CARE OF THINGS AT HOME, OR GET ALONG WITH OTHER PEOPLE: 1
5. POOR APPETITE OR OVEREATING: 0
SUM OF ALL RESPONSES TO PHQ QUESTIONS 1-9: 5

## 2023-02-09 NOTE — PROGRESS NOTES
[facility]   Internal Medicine     2023  Dean Nam : 1950 Sex: male  Age:72 y.o.    Chief Complaint   Patient presents with    Hypertension     6 month f/u    Cholesterol Problem     6 month f/u    Anxiety     6 month f/u    Depression     6 month f/u        HPI:   Patient presents to office for evaluation of the following medical concerns.    - States son was diagnosed with cancer.     - State pain to bilateral shoulders. States had right shoulder injection per ortho ().  States had MRI on right, shows partial tears, fluid and degenerative changes. [facility] .  Would like referral to orthopedics     - States pain to left knee. States still with pain. States taking glucosamine. Improved     - States having generalized abdominal pain, has improved and stable. Found duodenal stricture. States has had EGD and colonoscopy (2020)- showed gastritis and duodenal stricture, had follow up EGD and had dilation (2020)  - referred to endo surgery. Had EGD with EUS (10/20) - Duodenal stricture just distal to the duodenal bulb path - blood with few benign glandular cells. States some left sided soreness. On omeprazole. No nausea, no vomiting. No diarrhea or constipation. No melena or hematochezia.     - States has hypertension. States does check blood pressure at home, blood pressure 120-130's/70-80. Occasional in 140's. On amlodipine, lisinopril and atenolol. No reported side effects.     - Has high cholesterol. Trying to watch diet. On atorvastatin. No reported side effects. Last lab (2022) - borderline but improved     - States has depression. On Prozac 40mg - helping. States still having depression, given medical issues and family issues. States no suicidal thoughts. Discussed counseling - declines. States had increased prozac to 60mg but had diarrhea. Last PHQ score of 5 (2023). States having panic issues. States has been having  panic issues. States added hydroxyzine and takes daily at bedtime. Worsening with mom passing away (2022), with family issues around that and son with cancer diagnosis (2023)     - States had right detached retina. States following with specialist. Using eye drops at moment. States s/p repair (4/2019) and cataract repair.     - Bone density scan showed osteoporosis of hip. Was on fosamax. States stopped - due to stomach pain. Has issues with reclast with stomach. Discussed other option in treatment - different bisphosphonate or prolia. Declines. - States has migraines. Up and down. Still asking about stadol. States has seen neurology - not improved - tried Topamax and Imitrex. Did not tolerateTopamax, Stopped amitriptyline. Taking butalbital/aspirin/caffeine.    - States has asthma and stable with inhaler. States SOB is better. -  has seen chronic pain management, not currently following.  has had injections to right neck for right shoulder x 3 and States had Radio frequency ablation x 1. States has helped b/l shoulder pain.  has had MRI on cervical spine through [facility] - multiple osteophytes, disc disease and previous c5-6 fusion.    - States still has lower back pain. Unchanged. No recent trauma or injury. No bowel or bladder incontinence. No fever or chills. No numbness, weakness. States pain as sharp shooting. No radicular pain.     - lab work results from the [facility] done (11/28/2022) -CBC negative CMP negative cholesterol 146  LDL 99 triglyceride 162 HDL 32 PSA 0.59 UA negative reviewed with patient 2/9/2023    Health Maintenance   - immunizations:   Influenza Vaccination  - (9/14/2015) , (2017), (2019), (2020), (2022)   Pneumonia Vaccination - (10/2016) - prevnar, (10/2017) - pneumococcal 23  Zoster/Shingles Vaccine - (2020) x 2   Tetanus Vaccination  - (2015) - [facility] (1/2021) #1, (2/21/2021) #2, (10/20/2021)#3  (7/17/2022) #4[facility] Enrique Dew, (1/2023) bivalent pfizer     - Screenings:   Bone Density Test Screening - (10/25/2018)  Colonoscopy - (2014), (1/2020) - Minimal diverticulosis, sigmoid polyp x2 s/p polypectomy 3 to 4 mm, hemrrhoid, path - tubular and hyperplastic, follow-up in 2-3 years  Prostate     Couseled on Home Safety - (11/13/2017)    carotid ultrasound - (3/2015) - no significant stenosis    EGD - (1/2020) - Gastritis and gastric ulcer status post biopsy, duodenitis, Difficult to advance around the duodenal sweep due to apparent benign-appearing stricturing, poss due to ulcer - order upper GI , Upper GI confirm stricture, (6/20) duodenal stricture s/p dilation, refer to endosurger    ROS:  Review of Systems   Constitutional:  Negative for appetite change, chills, fever and unexpected weight change. HENT:  Negative for congestion, rhinorrhea and sore throat. Eyes:  Positive for visual disturbance. Negative for pain. Respiratory:  Negative for cough, chest tightness and shortness of breath. Cardiovascular:  Negative for chest pain and palpitations. Gastrointestinal:  Positive for abdominal pain. Negative for blood in stool, constipation, diarrhea, nausea and vomiting. Genitourinary:  Negative for difficulty urinating, dysuria, hematuria, scrotal swelling, testicular pain and urgency. Musculoskeletal:  Negative for arthralgias and gait problem. Skin:  Negative for rash. Neurological:  Positive for headaches. Negative for dizziness, syncope, weakness and light-headedness. Hematological:  Negative for adenopathy. Does not bruise/bleed easily. Psychiatric/Behavioral:  Negative for suicidal ideas. The patient is not nervous/anxious.         Current Outpatient Medications:     hydrOXYzine HCl (ATARAX) 25 MG tablet, Take 1 tablet by mouth 2 times daily as needed for Itching, Disp: 60 tablet, Rfl: 11    amLODIPine (NORVASC) 10 MG tablet, Take 1 tablet by mouth every morning, Disp: 90 tablet, Rfl: 3    atorvastatin (LIPITOR) 20 MG tablet, Take 1 tablet by mouth in the morning., Disp: 90 tablet, Rfl: 3    FLUoxetine (PROZAC) 40 MG capsule, Take 1 capsule by mouth every morning, Disp: 90 capsule, Rfl: 3    omeprazole (PRILOSEC) 40 MG delayed release capsule, Take 1 capsule by mouth every morning (before breakfast), Disp: 90 capsule, Rfl: 3    MULTIPLE VITAMIN PO, daily Ld 6/15/2020, Disp: , Rfl:     butalbital-acetaminophen-caffeine (FIORICET, ESGIC) -40 MG per tablet, Take 1 tablet by mouth every 4 hours as needed for Headaches, Disp: , Rfl:     niacin 500 MG extended release capsule, Take 500 mg by mouth nightly Ld 6/15/2020, Disp: , Rfl:     aspirin 81 MG tablet, Take 81 mg by mouth daily , Disp: , Rfl:     albuterol sulfate HFA (PROVENTIL;VENTOLIN;PROAIR) 108 (90 Base) MCG/ACT inhaler, Inhale into the lungs every 4 hours as needed Instructed to take am of procedure if needed and bring dos, Disp: , Rfl:     oxyCODONE-acetaminophen (PERCOCET) 5-325 MG per tablet, Take 1 tablet by mouth 2 times daily.  Instructed to take am of procedure, Disp: , Rfl:     lisinopril (PRINIVIL;ZESTRIL) 20 MG tablet, Take 20 mg by mouth 2 times daily , Disp: , Rfl:     atenolol (TENORMIN) 100 MG tablet, Take 100 mg by mouth every morning , Disp: , Rfl:     No Known Allergies    Past Medical History:   Diagnosis Date    Asthma     Depression     GERD (gastroesophageal reflux disease)     Hyperlipidemia     Hypertension     Migraine     Osteoporosis     Right retinal detachment 04/2019       Past Surgical History:   Procedure Laterality Date    CATARACT REMOVAL WITH IMPLANT Right     COLONOSCOPY N/A 1/14/2020    COLONOSCOPY WITH BIOPSY performed by Rafael Meza MD at [facility] Right 4/25/2019    RIGHT EYE RETINAL DETACHMENT REPAIR, PARS PLANA VITRECTOMY 25 GAUGE, ENDO LASER GAS FLUID EXCHANGE (SF6)++LATEX ALLERGY++ performed by Leonidas Riley MD at [address] [place] [address] EXTRACTION      UPPER GASTROINTESTINAL ENDOSCOPY N/A 1/14/2020    EGD BIOPSY performed by Johnette Runner, MD at [address] N/A 6/18/2020    EGD DILATION BALLOON performed by Johnette Runner, MD at [address] N/A 10/23/2020    ENDOSCOPIC EGD ULTRASOUND performed by Teodora Orlando MD at [address]  10/23/2020    EGD DILATION BALLOON performed by Teodora Orlando MD at [address]  10/23/2020    EGD BIOPSY performed by Teodora Orlando MD at [address] [place] [address]         No family history on file. Social History     Socioeconomic History    Marital status:      Spouse name: Not on file    Number of children: Not on file    Years of education: Not on file    Highest education level: Not on file   Occupational History    Not on file   Tobacco Use    Smoking status: Never    Smokeless tobacco: Never   Vaping Use    Vaping Use: Never used   Substance and Sexual Activity    Alcohol use: Yes     Comment: occ     Drug use: No    Sexual activity: Not on file   Other Topics Concern    Not on file   Social History Narrative    Not on file     Social Determinants of Health     Financial Resource Strain: Unknown    Difficulty of Paying Living Expenses: Patient refused   Food Insecurity: Unknown    Worried About Running Out of Food in the Last Year: Patient refused    [address] in the Last Year: Patient refused   Transportation Needs: Unknown    Lack of Transportation (Medical):  Not on file    Lack of Transportation (Non-Medical): Patient refused   Physical Activity: Sufficiently Active    Days of Exercise per Week: 7 days    Minutes of Exercise per Session: 30 min   Stress: Not on file   Social Connections: Not on file   Intimate Partner Violence: Not on file   Housing Stability: Unknown    Unable to Pay for Housing in the Last Year: Not on file    Number of Places Lived in the Last Year: Not on file    Unstable Housing in the Last Year: Patient refused       Vitals:    02/09/23 1258   BP: 126/70   Site: Left Upper Arm   Position: Sitting   Cuff Size: Large Adult   Pulse: 69   Resp: 18   Temp: 98.2 °F (36.8 °C)   TempSrc: Temporal   SpO2: 94%   Weight: 225 lb (102.1 kg)   Height: 5' 9\" (1.753 m)       Exam:  Physical Exam  Constitutional:       Appearance: He is well-developed. HENT:      Head: Normocephalic and atraumatic. Right Ear: External ear normal.      Left Ear: External ear normal.   Eyes:      Pupils: Pupils are equal, round, and reactive to light. Neck:      Thyroid: No thyromegaly. Cardiovascular:      Rate and Rhythm: Normal rate and regular rhythm. Heart sounds: Normal heart sounds. No murmur heard. Pulmonary:      Effort: Pulmonary effort is normal.      Breath sounds: Normal breath sounds. No wheezing or rales. Abdominal:      General: Bowel sounds are normal.      Palpations: Abdomen is soft. Tenderness: There is generalized abdominal tenderness. There is no guarding or rebound. Musculoskeletal:      Right shoulder: Decreased range of motion. Left shoulder: Decreased range of motion. Cervical back: Normal range of motion and neck supple. Lumbar back: Decreased range of motion. Lymphadenopathy:      Cervical: No cervical adenopathy. Skin:     General: Skin is warm and dry. Neurological:      Mental Status: He is alert and oriented to person, place, and time. Cranial Nerves: No cranial nerve deficit.    Psychiatric:         Judgment: Judgment normal.       Assessment and Plan:    Diagnoses and all orders for this visit:    Gastritis without bleeding, unspecified chronicity, unspecified gastritis type  - on omeprazole   - following with surgery  - last EGD (6/2020) which showed healing    - improved with PPI     Duodenal stricture  - on omeprazole   - following with surgery  - EGD (6/2020) which showed healing   - had EGD with EUS (10/2020) s/p dilation   - follow up in 1 year   Refer ot surgery for follow up (2/2023)     Essential hypertension  - monitor blood pressure at home  - watch diet  - on amlodipine   - on atenolol   - on lisinopril   - stable 2/9/2023  - continue present  medications and dosage 2/9/2023    Mixed hyperlipidemia  - watch diet  - on atrovastatin   - follow labs   - last lab (11/2022) - borderline but improved, triglycerides elevated   - continue current mediation and dosage 2/9/2023    Dysthymia  - referral to counseling - declined  - did not tolerate zoloft  - off cymbalta  - on prozac 40mg  - did not tolerate increase of prozac to 60mg   - PHQ score of 5 (2/9/2023)   - add hydroxyzine 25mg prn - side effects reviewed     Stage 2 chronic kidney disease  - stopped meloxicam  - avoid NSAID  - recheck labs  - last lab (11/2022) - stable , creat 1.2, gfr 65     Nontraumatic incomplete tear of right rotator cuff  - has been following with pain management - injections   - had abnormality of humerus on right, MRI 2018   - [facility]   - Stable     Cervical disc disease  - previous c5-6 surgery  - weakness of forearm  - Stable and unchanged     Chronic nonintractable headache, unspecified headache type  - continue present treatment  - not currently following with neurology  - did not tolerate Topamax  - off amitriptyline  - has asked for stadol - discussed not recommended  - on fiorcet     Chronic pain disorder  - no longer following with pain management - do not accept insurance  - has had injections - poss RFA  - has asked for stadol for migraine    Chronic bilateral low back pain without sciatica  - no longer following with pain management  - s/p RFA and injections - uncertain benefit     Other osteoporosis without current pathological fracture  - DEXA (11/2018)  - started fosamax - had to stop due to stomach pain, discussed trying another type, switch to prolia or trying reclast infusion.   - had reclast      Adenomatous polyp of colon, unspecified part of colon  - last colonoscopy (1/2020)   - follow up in 2-3 years   - referral to surgery for follow up (2/2023)     Acute pain of left knee  - poss strain and sprain from fall   - will check xray   - compression, rest, ice   - home exercises   - if not better ortho - declines at present      Return in about 6 months (around 8/9/2023) for check up and review and labs.     Orders Placed This Encounter   Procedures    Stephani Michelle MD, General Surgery, [facility]     Referral Priority:   Routine     Referral Type:   Eval and Treat     Referral Reason:   Specialty Services Required     Referred to Provider:   Rafael Meza MD     Requested Specialty:   General Surgery     Number of Visits Requested:   Gifty Mojica MD, Orthopaedics, [facility]     Referral Priority:   Routine     Referral Type:   Eval and Treat     Referral Reason:   Specialty Services Required     Referred to Provider:   Govind Schultz MD     Requested Specialty:   Orthopedic Surgery     Number of Visits Requested:   1       Requested Prescriptions      No prescriptions requested or ordered in this encounter     Marisol Stevenson MD  2/9/2023  1:29 PM

## 2023-02-10 DIAGNOSIS — R10.84 GENERALIZED ABDOMINAL PAIN: ICD-10-CM

## 2023-02-10 RX ORDER — OMEPRAZOLE 40 MG/1
40 CAPSULE, DELAYED RELEASE ORAL
Qty: 90 CAPSULE | Refills: 3 | Status: SHIPPED | OUTPATIENT
Start: 2023-02-10

## 2023-02-10 NOTE — TELEPHONE ENCOUNTER
Last Appointment:  2/9/2023  Future Appointments   Date Time Provider Xiao Morrisi   3/20/2023 11:00 AM Joao Grady MD [facility]   8/3/2023 10:00 AM Sravanthi Chou  [address] [place] [address]

## 2023-03-20 ENCOUNTER — OFFICE VISIT (OUTPATIENT)
Dept: ORTHOPEDIC SURGERY | Age: 73
End: 2023-03-20
Payer: MEDICARE

## 2023-03-20 VITALS — WEIGHT: 225 LBS | BODY MASS INDEX: 33.33 KG/M2 | HEIGHT: 69 IN

## 2023-03-20 DIAGNOSIS — M25.511 CHRONIC RIGHT SHOULDER PAIN: Primary | ICD-10-CM

## 2023-03-20 DIAGNOSIS — G89.29 CHRONIC RIGHT SHOULDER PAIN: Primary | ICD-10-CM

## 2023-03-20 PROCEDURE — 20610 DRAIN/INJ JOINT/BURSA W/O US: CPT | Performed by: ORTHOPAEDIC SURGERY

## 2023-03-20 PROCEDURE — 99203 OFFICE O/P NEW LOW 30 MIN: CPT | Performed by: ORTHOPAEDIC SURGERY

## 2023-03-20 PROCEDURE — 1123F ACP DISCUSS/DSCN MKR DOCD: CPT | Performed by: ORTHOPAEDIC SURGERY

## 2023-03-20 RX ORDER — TRIAMCINOLONE ACETONIDE 40 MG/ML
40 INJECTION, SUSPENSION INTRA-ARTICULAR; INTRAMUSCULAR ONCE
Status: COMPLETED | OUTPATIENT
Start: 2023-03-20 | End: 2023-03-20

## 2023-03-20 RX ORDER — LIDOCAINE HYDROCHLORIDE 10 MG/ML
4 INJECTION, SOLUTION INFILTRATION; PERINEURAL ONCE
Status: COMPLETED | OUTPATIENT
Start: 2023-03-20 | End: 2023-03-20

## 2023-03-20 RX ORDER — LANOLIN ALCOHOL/MO/W.PET/CERES
1 CREAM (GRAM) TOPICAL
COMMUNITY

## 2023-03-20 RX ADMIN — TRIAMCINOLONE ACETONIDE 40 MG: 40 INJECTION, SUSPENSION INTRA-ARTICULAR; INTRAMUSCULAR at 11:42

## 2023-03-20 RX ADMIN — LIDOCAINE HYDROCHLORIDE 4 ML: 10 INJECTION, SOLUTION INFILTRATION; PERINEURAL at 11:41

## 2023-03-20 NOTE — PROGRESS NOTES
There is tenderness over the anterior shoulder    IMAGING:     XRAY:  3 views of the right shoulder show mild arthritic changes of the glenohumeral joint    Radiographic findings reviewed with patient    Procedure Note:  Right Shoulder steroid injection     The Right shoulder was identified as the injection site. The risk and benefits of a cortisone injection were explained and the patient consented to the injection. Under sterile conditions, the subacromial space was injected from posterior approach with a mixture of 40 mg of Kenalog, 4 cc  1% Lidocaine without complication. A sterile bandage was applied. Administrations This Visit       lidocaine 1 % injection 4 mL       Admin Date  03/20/2023 Action  Given Dose  4 mL Route  Intra-artICUlar Administered By  Bolivar Buerger, ATC              triamcinolone acetonide (KENALOG-40) injection 40 mg       Admin Date  03/20/2023 Action  Given Dose  40 mg Route  Intra-artICUlar Administered By  Bolivar Buerger, ATC                      ASSESSMENT   Right shoulder mild osteoarthritis      PLAN  We discussed his shoulder today. Discussed x-ray findings showing mild progression of arthritis from his previous films several years ago. He also has exam concerning for possible rotator cuff pathology. At this point I would like to obtain a new MRI to better assess the soft tissues to help determine surgery options moving forward if he fails continue conservative treatment. We also gave him a subacromial steroid injection today to hopefully give him some relief. We will call him with results of MRI to determine treatment options moving forward in the event that he does not improve with injection.         Romero Soto MD  Orthopaedic Surgery   3/20/23  10:56 AM

## 2023-03-24 ENCOUNTER — OFFICE VISIT (OUTPATIENT)
Dept: SURGERY | Age: 73
End: 2023-03-24

## 2023-03-24 VITALS — WEIGHT: 221 LBS | SYSTOLIC BLOOD PRESSURE: 126 MMHG | BODY MASS INDEX: 32.64 KG/M2 | DIASTOLIC BLOOD PRESSURE: 84 MMHG

## 2023-03-24 DIAGNOSIS — Z86.010 HISTORY OF COLON POLYPS: ICD-10-CM

## 2023-03-24 DIAGNOSIS — K31.5 DUODENAL STRICTURE: Primary | ICD-10-CM

## 2023-03-29 ENCOUNTER — PREP FOR PROCEDURE (OUTPATIENT)
Dept: SURGERY | Age: 73
End: 2023-03-29

## 2023-03-29 ENCOUNTER — TELEPHONE (OUTPATIENT)
Dept: SURGERY | Age: 73
End: 2023-03-29

## 2023-03-29 PROBLEM — Z86.010 HX OF COLONIC POLYP: Status: ACTIVE | Noted: 2023-03-29

## 2023-03-29 PROBLEM — Z86.0100 HX OF COLONIC POLYP: Status: ACTIVE | Noted: 2023-03-29

## 2023-03-29 NOTE — TELEPHONE ENCOUNTER
Hayley Gordon is scheduled for colonoscopy with Dr Skyler Song on 06-06-23 at [facility] at 11:30 am. Patient was told to arrive at 10:30 am. Patient needs to be NPO after midnight the night before procedure. All surgery instructions were explained to the patient and a surgery letter was also mailed out. MA informed patient that PAT will also be calling to review pre-op instructions and medications. Patient verbalized understanding.   Electronically signed by Gricel Dias MA on 3/29/2023 at 10:25 AM

## 2023-03-29 NOTE — TELEPHONE ENCOUNTER
Prior Authorization Form:      DEMOGRAPHICS:                     Patient Name:  Xiao Ro  Patient :  1950            Insurance:  Payor: Stephanie Sky / Plan: Jon Mar / Product Type: *No Product type* /   Insurance ID Number:    Payer/Plan Subscr  Sex Relation Sub. Ins. ID Effective Group Num   1.  [address] [facility] 1950 Male Self [id number]                                    [id number]         DIAGNOSIS & PROCEDURE:                       Procedure/Operation: EGD with dilation/colonoscopy           CPT Code: 62515/89161    Diagnosis:  Duodenal stricture/history of colon polyps    ICD10 Code: K31.5/Z86.010    Location:  [facility]    Surgeon:  Dr Saman Wolfe INFORMATION:                          Date: 23    Time: 11:30 am              Anesthesia:  [facility]                                                       Status:  Outpatient        Special Comments:         Electronically signed by Daly Arreola MA on 3/29/2023 at 10:26 AM

## 2023-04-07 ENCOUNTER — HOSPITAL ENCOUNTER (OUTPATIENT)
Dept: MRI IMAGING | Age: 73
End: 2023-04-07
Payer: MEDICARE

## 2023-04-07 DIAGNOSIS — M25.511 CHRONIC RIGHT SHOULDER PAIN: ICD-10-CM

## 2023-04-07 DIAGNOSIS — G89.29 CHRONIC RIGHT SHOULDER PAIN: ICD-10-CM

## 2023-04-07 PROCEDURE — 73221 MRI JOINT UPR EXTREM W/O DYE: CPT

## 2023-04-24 ENCOUNTER — OFFICE VISIT (OUTPATIENT)
Dept: ORTHOPEDIC SURGERY | Age: 73
End: 2023-04-24

## 2023-04-24 VITALS — WEIGHT: 210 LBS | HEIGHT: 69 IN | BODY MASS INDEX: 31.1 KG/M2

## 2023-04-24 DIAGNOSIS — S46.001A INJURY OF RIGHT ROTATOR CUFF, INITIAL ENCOUNTER: Primary | ICD-10-CM

## 2023-04-24 DIAGNOSIS — G89.29 CHRONIC RIGHT SHOULDER PAIN: ICD-10-CM

## 2023-04-24 DIAGNOSIS — M25.511 CHRONIC RIGHT SHOULDER PAIN: ICD-10-CM

## 2023-04-24 RX ORDER — TRIAMCINOLONE ACETONIDE 40 MG/ML
40 INJECTION, SUSPENSION INTRA-ARTICULAR; INTRAMUSCULAR ONCE
Status: COMPLETED | OUTPATIENT
Start: 2023-04-24 | End: 2023-04-24

## 2023-04-24 RX ORDER — BUPIVACAINE HYDROCHLORIDE 2.5 MG/ML
2 INJECTION, SOLUTION INFILTRATION; PERINEURAL ONCE
Status: COMPLETED | OUTPATIENT
Start: 2023-04-24 | End: 2023-04-24

## 2023-04-24 RX ORDER — LIDOCAINE HYDROCHLORIDE 10 MG/ML
4 INJECTION, SOLUTION INFILTRATION; PERINEURAL ONCE
Status: CANCELLED | OUTPATIENT
Start: 2023-04-24 | End: 2023-04-24

## 2023-04-24 RX ADMIN — BUPIVACAINE HYDROCHLORIDE 5 MG: 2.5 INJECTION, SOLUTION INFILTRATION; PERINEURAL at 16:33

## 2023-04-24 RX ADMIN — TRIAMCINOLONE ACETONIDE 40 MG: 40 INJECTION, SUSPENSION INTRA-ARTICULAR; INTRAMUSCULAR at 16:34

## 2023-04-24 NOTE — PATIENT INSTRUCTIONS
CALL OUR OFFICE AND ASK FOR CHARLEY TO BEGIN THE SCHEDULING PROCESS FOR YOUR UPCOMING SURGERY.      [address]

## 2023-04-24 NOTE — PROGRESS NOTES
[facility]   ORTHOPAEDIC SURGERY AND SPORTS MEDICINE  DATE OF VISIT: 04/24/23  Follow Up Visit     CHIEF COMPLAINT:   Chief Complaint   Patient presents with    Follow-up     Right shoulder mild osteoarthritis    Injections     Rt shld cortisone injection on 3/20/2023    Pain     Rt shld 9 / 10    Results     Rt shld MRI review and options        HPI:    Lu Feng is a 71y.o. year old male who presented to the office today for follow up of Right shoulder mild osteoarthritis, previously evaluated on 3/20/2023. Previous treatment has included cortisone injection, MRI. He reports symptoms are unchanged. The patient has not responded to the treatment. REVIEW OF SYSTEMS:     Constitutional:  Negative for weight loss, fevers, chills, fatigue  Cardiovascular: Negative for chest pain, palpitations  Pulmonary: Negative for shortness of breath, labored breathing, cough  GI: negative for abdominal pain, nausea, vomiting   MSK: per HPI  Skin: negative for rash, open wounds    All other systems reviewed and are negative       Physical Exam:     Height: 5' 9\" (1.753 m), Weight: 210 lb (95.3 kg) (per pt)    General: Alert and oriented x3, no acute distress  Cardiovascular/pulmonary: No labored breathing, peripheral perfusion intact  Musculoskeletal:    Right shoulder exam range of motion 130/50/iliac crest.  Jobes test intact. Positive pain and weakness on speeds Bourg's test.  Cross body motion was most painful. Belly press test displays very limited range of motion and pain no swelling or deformity was visualized. Positive anterior joint tenderness. There is significant crepitus within the shoulder with range of motion.     Controlled Substances Monitoring:      Imaging:  Recent x-rays reviewed showing degenerative changes to the glenohumeral joint    MRI reviewed showing partial-thickness tears of the long head biceps tendon and subscapularis    Procedure Note: Shoulder Steroid Injection     The Right shoulder

## 2023-06-02 LAB
6-ACETYLMORPHINE, UR: NORMAL
ALBUMIN SERPL-MCNC: 4.1 G/DL
ALP BLD-CCNC: 74 U/L
ALT SERPL-CCNC: 49 U/L
AMPHETAMINE SCREEN, URINE: NORMAL
ANION GAP SERPL CALCULATED.3IONS-SCNC: 12.7 MMOL/L
AST SERPL-CCNC: 29 U/L
BARBITURATE SCREEN, URINE: NORMAL
BASOPHILS ABSOLUTE: 0.1 /ΜL
BASOPHILS RELATIVE PERCENT: 0.8 %
BENZODIAZEPINE SCREEN, URINE: NORMAL
BILIRUB SERPL-MCNC: 0.9 MG/DL (ref 0.1–1.4)
BUN BLDV-MCNC: 19 MG/DL
CALCIUM SERPL-MCNC: 9.7 MG/DL
CANNABINOID SCREEN URINE: NORMAL
CHLORIDE BLD-SCNC: 106 MMOL/L
CHOLESTEROL, TOTAL: 126 MG/DL
CHOLESTEROL/HDL RATIO: ABNORMAL
CO2: 26 MMOL/L
COCAINE METABOLITE, URINE: NORMAL
CREAT SERPL-MCNC: 1.2 MG/DL
CREATININE URINE: NORMAL
EDDP, URINE: NORMAL
EGFR: 65
EOSINOPHILS ABSOLUTE: 0.2 /ΜL
EOSINOPHILS RELATIVE PERCENT: 2.5 %
ETHANOL URINE: NORMAL
GLUCOSE BLD-MCNC: 98 MG/DL
HCT VFR BLD CALC: 43.6 % (ref 41–53)
HDLC SERPL-MCNC: 31 MG/DL (ref 35–70)
LDL CHOLESTEROL CALCULATED: 78 MG/DL (ref 0–160)
LYMPHOCYTES ABSOLUTE: 2 /ΜL
LYMPHOCYTES RELATIVE PERCENT: 21.6 %
MCH RBC QN AUTO: 32.3 PG
MCHC RBC AUTO-ENTMCNC: 35.4 G/DL
MCV RBC AUTO: 91.2 FL
MDMA URINE: NORMAL
METHADONE SCREEN, URINE: NORMAL
METHAMPHETAMINE, URINE: NORMAL
MONOCYTES ABSOLUTE: 0.8 /ΜL
MONOCYTES RELATIVE PERCENT: 9.1 %
NEUTROPHILS ABSOLUTE: 6 /ΜL
NEUTROPHILS RELATIVE PERCENT: 66 %
NONHDLC SERPL-MCNC: ABNORMAL MG/DL
OPIATES, URINE: NORMAL
OXYCODONE: NORMAL
PCP: NORMAL
PDW BLD-RTO: 13.3 %
PH, URINE: NORMAL
PHENCYCLIDINE, URINE: NORMAL
PLATELET # BLD: 229 K/ΜL
PMV BLD AUTO: 7.7 FL
POTASSIUM SERPL-SCNC: 4.7 MMOL/L
PROPOXYPHENE, URINE: NORMAL
RBC # BLD: 4.79 10^6/ΜL
SODIUM BLD-SCNC: 140 MMOL/L
TOTAL PROTEIN: 6.9
TRICYCLIC ANTIDEPRESSANTS, UR: NORMAL
TRIGL SERPL-MCNC: 130 MG/DL
VLDLC SERPL CALC-MCNC: ABNORMAL MG/DL
WBC # BLD: 9.1 10^3/ML

## 2023-06-06 ENCOUNTER — ANESTHESIA (OUTPATIENT)
Dept: ENDOSCOPY | Age: 73
End: 2023-06-06
Payer: MEDICARE

## 2023-06-06 ENCOUNTER — ANESTHESIA EVENT (OUTPATIENT)
Dept: ENDOSCOPY | Age: 73
End: 2023-06-06
Payer: MEDICARE

## 2023-06-06 ENCOUNTER — HOSPITAL ENCOUNTER (OUTPATIENT)
Age: 73
Setting detail: OUTPATIENT SURGERY
Discharge: HOME | End: 2023-06-06
Attending: SURGERY | Admitting: SURGERY
Payer: MEDICARE

## 2023-06-06 VITALS
SYSTOLIC BLOOD PRESSURE: 108 MMHG | TEMPERATURE: 97 F | OXYGEN SATURATION: 96 % | DIASTOLIC BLOOD PRESSURE: 76 MMHG | WEIGHT: 210 LBS | HEART RATE: 72 BPM | RESPIRATION RATE: 16 BRPM | HEIGHT: 69 IN | BODY MASS INDEX: 31.1 KG/M2

## 2023-06-06 DIAGNOSIS — K31.5 DUODENAL STRICTURE: ICD-10-CM

## 2023-06-06 DIAGNOSIS — Z86.010 HX OF COLONIC POLYP: ICD-10-CM

## 2023-06-06 PROCEDURE — 3700000000 HC ANESTHESIA ATTENDED CARE: Performed by: SURGERY

## 2023-06-06 PROCEDURE — 2709999900 HC NON-CHARGEABLE SUPPLY: Performed by: SURGERY

## 2023-06-06 PROCEDURE — 3609012400 HC EGD TRANSORAL BIOPSY SINGLE/MULTIPLE: Performed by: SURGERY

## 2023-06-06 PROCEDURE — 3609010300 HC COLONOSCOPY W/BIOPSY SINGLE/MULTIPLE: Performed by: SURGERY

## 2023-06-06 PROCEDURE — 3700000001 HC ADD 15 MINUTES (ANESTHESIA): Performed by: SURGERY

## 2023-06-06 PROCEDURE — 2500000003 HC RX 250 WO HCPCS: Performed by: NURSE ANESTHETIST, CERTIFIED REGISTERED

## 2023-06-06 PROCEDURE — 7100000011 HC PHASE II RECOVERY - ADDTL 15 MIN: Performed by: SURGERY

## 2023-06-06 PROCEDURE — 7100000010 HC PHASE II RECOVERY - FIRST 15 MIN: Performed by: SURGERY

## 2023-06-06 PROCEDURE — 2580000003 HC RX 258: Performed by: NURSE ANESTHETIST, CERTIFIED REGISTERED

## 2023-06-06 PROCEDURE — 6360000002 HC RX W HCPCS: Performed by: NURSE ANESTHETIST, CERTIFIED REGISTERED

## 2023-06-06 RX ORDER — EPHEDRINE SULFATE 50 MG/ML
INJECTION INTRAVENOUS PRN
Status: DISCONTINUED | OUTPATIENT
Start: 2023-06-06 | End: 2023-06-06 | Stop reason: SDUPTHER

## 2023-06-06 RX ORDER — SODIUM CHLORIDE 9 MG/ML
INJECTION, SOLUTION INTRAVENOUS CONTINUOUS PRN
Status: DISCONTINUED | OUTPATIENT
Start: 2023-06-06 | End: 2023-06-06 | Stop reason: SDUPTHER

## 2023-06-06 RX ORDER — PROPOFOL 10 MG/ML
INJECTION, EMULSION INTRAVENOUS PRN
Status: DISCONTINUED | OUTPATIENT
Start: 2023-06-06 | End: 2023-06-06 | Stop reason: SDUPTHER

## 2023-06-06 RX ADMIN — SODIUM CHLORIDE: 9 INJECTION, SOLUTION INTRAVENOUS at 10:35

## 2023-06-06 RX ADMIN — EPHEDRINE SULFATE 5 MG: 50 INJECTION, SOLUTION INTRAVENOUS at 11:17

## 2023-06-06 RX ADMIN — PROPOFOL 440 MG: 10 INJECTION, EMULSION INTRAVENOUS at 11:03

## 2023-06-06 ASSESSMENT — ENCOUNTER SYMPTOMS: SHORTNESS OF BREATH: 0

## 2023-06-06 ASSESSMENT — LIFESTYLE VARIABLES: SMOKING_STATUS: 0

## 2023-06-06 NOTE — ANESTHESIA PRE PROCEDURE
BIOPSY performed by Keyana Barton MD at [address] [facility] N/A 6/18/2020    EGD DILATION BALLOON performed by Keyana Barton MD at [address] [facility] 10/23/2020    ENDOSCOPIC EGD ULTRASOUND performed by Barrington Barton MD at [address] [facility]  10/23/2020    EGD DILATION BALLOON performed by Barrington Barton MD at [address] [facility]  10/23/2020    EGD BIOPSY performed by Barrington Barton MD at [address] [facility]         Social History:    Social History     Tobacco Use    Smoking status: Never    Smokeless tobacco: Never   Substance Use Topics    Alcohol use: Yes     Alcohol/week: 2.0 standard drinks     Types: 2 Cans of beer per week     Comment: valentin                                Counseling given: Not Answered      Vital Signs (Current):   Vitals:    06/02/23 0935   Weight: 210 lb (95.3 kg)   Height: 5' 9\" (1.753 m)                                              BP Readings from Last 3 Encounters:   03/24/23 126/84   02/09/23 126/70   08/02/22 134/84       NPO Status:                                                                                 BMI:   Wt Readings from Last 3 Encounters:   06/02/23 210 lb (95.3 kg)   04/24/23 210 lb (95.3 kg)   03/24/23 221 lb (100.2 kg)     Body mass index is 31.01 kg/m².     CBC:   Lab Results   Component Value Date/Time    WBC 7.9 03/18/2022 10:22 AM    RBC 4.71 03/18/2022 10:22 AM    HGB 14.9 03/18/2022 10:22 AM    HCT 43.1 03/18/2022 10:22 AM    MCV 91.5 03/18/2022 10:22 AM    RDW 13.0 03/18/2022 10:22 AM     03/18/2022 10:22 AM       CMP:   Lab Results   Component Value Date/Time     03/18/2022 10:22 AM    K 4.7 03/18/2022 10:22 AM     03/18/2022 10:22 AM    CO2 27 03/18/2022 10:22 AM    BUN 24 03/18/2022 10:22 AM    CREATININE 1.2 03/18/2022 10:22 AM    GFRAA >60 09/25/2019 11:41 AM

## 2023-06-06 NOTE — H&P
DILATION BALLOON performed by Trell Arias MD at [place] [address] N/A 10/23/2020     ENDOSCOPIC EGD ULTRASOUND performed by David Perez MD at [place] [address]   10/23/2020     EGD DILATION BALLOON performed by David Perez MD at [place] [address]   10/23/2020     EGD BIOPSY performed by David Perez MD at [address] [place] [address] Medications           Prior to Admission medications    Medication Sig Start Date End Date Taking? Authorizing Provider   glucosamine-chondroitin 500-400 MG tablet Take 1 tablet by mouth once as needed for Pain     Yes Historical Provider, MD   omeprazole (PRILOSEC) 40 MG delayed release capsule Take 1 capsule by mouth every morning (before breakfast) 2/10/23   Yes Cruzito Beverly MD   hydrOXYzine HCl (ATARAX) 25 MG tablet Take 1 tablet by mouth 2 times daily as needed for Itching 11/23/22   Yes Cruzito Beverly MD   amLODIPine (NORVASC) 10 MG tablet Take 1 tablet by mouth every morning 8/2/22   Yes Cruzito Beverly MD   atorvastatin (LIPITOR) 20 MG tablet Take 1 tablet by mouth in the morning.  8/2/22   Yes Cruzito Beverly MD   FLUoxetine [facility]) 40 MG capsule Take 1 capsule by mouth every morning 8/2/22   Yes Cruzito Beverly MD   MULTIPLE VITAMIN PO daily Ld 6/15/2020     Yes Historical Provider, MD   butalbital-acetaminophen-caffeine (FIORICET, ESGIC) -40 MG per tablet Take 1 tablet by mouth every 4 hours as needed for Headaches     Yes Historical Provider, MD   niacin 500 MG extended release capsule Take 500 mg by mouth nightly Ld 6/15/2020     Yes Historical Provider, MD   aspirin 81 MG tablet Take 81 mg by mouth daily      Yes Historical Provider, MD   albuterol sulfate HFA (PROVENTIL;VENTOLIN;PROAIR) 108 (90 Base) MCG/ACT inhaler Inhale into the lungs every 4 hours as needed Instructed to take am of procedure if needed

## 2023-06-06 NOTE — OP NOTE
EGD/Colonoscopy Op Note    PATIENT: Tre Johnson    DATE OF PROCEDURE: 6/6/2023     SURGEON: Maudry Kussmaul, MD    PREOPERATIVE DIAGNOSIS:  History of duodenal stricture and colon polyps    POSTOPERATIVE DIAGNOSIS: Same, area of prior duodenal stricture patent but friable, duodenal diverticulum, mild gastritis, colon polyps    OPERATION: Procedure(s):  EGD BIOPSY  COLONOSCOPY WITH BIOPSY    ANESTHESIA: Local monitored anesthesia. ESTIMATED BLOOD LOSS: minimal    COMPLICATIONS: None. SPECIMENS:    ID Type Source Tests Collected by Time Destination   A : Antral Bx Tissue Stomach SURGICAL PATHOLOGY Maudry Kussmaul, MD 6/6/2023 1113    B : Duodenal Bx Tissue Duodenum SURGICAL PATHOLOGY Maudry Kussmaul, MD 6/6/2023 1114    C : Hepatic Flexure Polyp Bx Tissue Colon SURGICAL PATHOLOGY Maudry Kussmaul, MD 6/6/2023 1118    D : Transverse Colon Polyp Bx Tissue Colon SURGICAL PATHOLOGY Maudry Kussmaul, MD 6/6/2023 1125          HISTORY: The patient is a 71y.o. year old male with history of above preop diagnosis. I recommended esophagogastroduodenoscopy and colonoscopy with possible biopsy/polypectomy and I explained the risk, benefits, expected outcome, and alternatives to the procedure. Risks included but are not limited to bleeding, infection, respiratory distress, hypotension, and perforation. Patient understands and is in agreement. PROCEDURE: The patient was given IV conscious sedation per anesthesia. The patient was given supplemental oxygen by nasal cannula. The gastroscope was inserted orally and advanced under direct vision through the esophagus, through the stomach, through the pylorus, and into the duodenum. FINDINGS:    Duodenum: Area of prior stricture is patent, but slightly friable. The scope was easily able to traverse through this. Given lack of symptoms, no dilation felt necessary biopsies were taken. Duodenal diverticulum is noted.     Stomach: Mild gastritis status post antral

## 2023-06-06 NOTE — ANESTHESIA POSTPROCEDURE EVALUATION
Department of Anesthesiology  Postprocedure Note    Patient: Minerva Ortiz  MRN: [id number]  YOB: 1950  Date of evaluation: 6/6/2023      Procedure Summary     Date: 06/06/23 Room / Location: [facility] 03 / [facility]    Anesthesia Start: 1059 Anesthesia Stop: 1133    Procedures:       EGD BIOPSY      COLONOSCOPY WITH BIOPSY Diagnosis:       Duodenal stricture      Hx of colonic polyp      (Duodenal stricture [K31.5])      (Hx of colonic polyp [Z86.010])    Surgeons: Laura Trevino MD Responsible Provider: Iain Monaco MD    Anesthesia Type: MAC ASA Status: 3          Anesthesia Type: No value filed.     Gonzalez Phase I:      Gonzalez Phase II:        Anesthesia Post Evaluation    Patient location during evaluation: PACU  Patient participation: complete - patient participated  Level of consciousness: awake  Airway patency: patent  Nausea & Vomiting: no nausea and no vomiting  Complications: no  Cardiovascular status: hemodynamically stable  Respiratory status: acceptable  Hydration status: euvolemic

## 2023-06-23 ENCOUNTER — OFFICE VISIT (OUTPATIENT)
Dept: SURGERY | Age: 73
End: 2023-06-23
Payer: MEDICARE

## 2023-06-23 VITALS
SYSTOLIC BLOOD PRESSURE: 132 MMHG | HEART RATE: 70 BPM | DIASTOLIC BLOOD PRESSURE: 84 MMHG | WEIGHT: 221 LBS | BODY MASS INDEX: 32.64 KG/M2 | OXYGEN SATURATION: 96 %

## 2023-06-23 DIAGNOSIS — K31.5 DUODENAL STRICTURE: ICD-10-CM

## 2023-06-23 DIAGNOSIS — K63.5 COLORECTAL POLYP DETECTED ON COLONOSCOPY: Primary | ICD-10-CM

## 2023-06-23 PROCEDURE — 1123F ACP DISCUSS/DSCN MKR DOCD: CPT | Performed by: SURGERY

## 2023-06-23 PROCEDURE — 99214 OFFICE O/P EST MOD 30 MIN: CPT | Performed by: SURGERY

## 2023-06-23 PROCEDURE — 3078F DIAST BP <80 MM HG: CPT | Performed by: SURGERY

## 2023-06-23 PROCEDURE — 3074F SYST BP LT 130 MM HG: CPT | Performed by: SURGERY

## 2023-08-03 ENCOUNTER — OFFICE VISIT (OUTPATIENT)
Dept: FAMILY MEDICINE CLINIC | Age: 73
End: 2023-08-03

## 2023-08-03 ENCOUNTER — OFFICE VISIT (OUTPATIENT)
Dept: FAMILY MEDICINE CLINIC | Age: 73
End: 2023-08-03
Payer: MEDICARE

## 2023-08-03 VITALS
DIASTOLIC BLOOD PRESSURE: 86 MMHG | RESPIRATION RATE: 20 BRPM | OXYGEN SATURATION: 95 % | TEMPERATURE: 99 F | WEIGHT: 221 LBS | HEART RATE: 69 BPM | SYSTOLIC BLOOD PRESSURE: 128 MMHG | BODY MASS INDEX: 32.73 KG/M2 | HEIGHT: 69 IN

## 2023-08-03 VITALS
TEMPERATURE: 99 F | HEART RATE: 69 BPM | BODY MASS INDEX: 32.73 KG/M2 | SYSTOLIC BLOOD PRESSURE: 128 MMHG | HEIGHT: 69 IN | WEIGHT: 221 LBS | RESPIRATION RATE: 20 BRPM | DIASTOLIC BLOOD PRESSURE: 86 MMHG | OXYGEN SATURATION: 95 %

## 2023-08-03 DIAGNOSIS — K29.70 GASTRITIS WITHOUT BLEEDING, UNSPECIFIED CHRONICITY, UNSPECIFIED GASTRITIS TYPE: ICD-10-CM

## 2023-08-03 DIAGNOSIS — G89.29 CHRONIC NONINTRACTABLE HEADACHE, UNSPECIFIED HEADACHE TYPE: ICD-10-CM

## 2023-08-03 DIAGNOSIS — F34.1 DYSTHYMIA: ICD-10-CM

## 2023-08-03 DIAGNOSIS — M54.50 CHRONIC BILATERAL LOW BACK PAIN WITHOUT SCIATICA: ICD-10-CM

## 2023-08-03 DIAGNOSIS — Z00.00 MEDICARE ANNUAL WELLNESS VISIT, SUBSEQUENT: Primary | ICD-10-CM

## 2023-08-03 DIAGNOSIS — G89.4 CHRONIC PAIN DISORDER: ICD-10-CM

## 2023-08-03 DIAGNOSIS — N18.2 STAGE 2 CHRONIC KIDNEY DISEASE: ICD-10-CM

## 2023-08-03 DIAGNOSIS — G89.29 CHRONIC BILATERAL LOW BACK PAIN WITHOUT SCIATICA: ICD-10-CM

## 2023-08-03 DIAGNOSIS — K31.5 DUODENAL STRICTURE: ICD-10-CM

## 2023-08-03 DIAGNOSIS — E78.2 MIXED HYPERLIPIDEMIA: ICD-10-CM

## 2023-08-03 DIAGNOSIS — R51.9 CHRONIC NONINTRACTABLE HEADACHE, UNSPECIFIED HEADACHE TYPE: ICD-10-CM

## 2023-08-03 DIAGNOSIS — I10 ESSENTIAL HYPERTENSION: Primary | ICD-10-CM

## 2023-08-03 DIAGNOSIS — M81.8 OTHER OSTEOPOROSIS WITHOUT CURRENT PATHOLOGICAL FRACTURE: ICD-10-CM

## 2023-08-03 PROCEDURE — 3079F DIAST BP 80-89 MM HG: CPT | Performed by: INTERNAL MEDICINE

## 2023-08-03 PROCEDURE — 3074F SYST BP LT 130 MM HG: CPT | Performed by: INTERNAL MEDICINE

## 2023-08-03 PROCEDURE — G0439 PPPS, SUBSEQ VISIT: HCPCS | Performed by: INTERNAL MEDICINE

## 2023-08-03 PROCEDURE — 1123F ACP DISCUSS/DSCN MKR DOCD: CPT | Performed by: INTERNAL MEDICINE

## 2023-08-03 RX ORDER — HYDROXYZINE HYDROCHLORIDE 25 MG/1
25 TABLET, FILM COATED ORAL 2 TIMES DAILY PRN
Qty: 60 TABLET | Refills: 11 | Status: SHIPPED | OUTPATIENT
Start: 2023-08-03

## 2023-08-03 RX ORDER — AMLODIPINE BESYLATE 10 MG/1
10 TABLET ORAL EVERY MORNING
Qty: 90 TABLET | Refills: 3 | Status: SHIPPED | OUTPATIENT
Start: 2023-08-03

## 2023-08-03 RX ORDER — ATORVASTATIN CALCIUM 20 MG/1
20 TABLET, FILM COATED ORAL DAILY
Qty: 90 TABLET | Refills: 3 | Status: SHIPPED | OUTPATIENT
Start: 2023-08-03

## 2023-08-03 RX ORDER — FLUOXETINE HYDROCHLORIDE 40 MG/1
40 CAPSULE ORAL EVERY MORNING
Qty: 90 CAPSULE | Refills: 3 | Status: SHIPPED | OUTPATIENT
Start: 2023-08-03

## 2023-08-03 ASSESSMENT — ENCOUNTER SYMPTOMS
COUGH: 0
CONSTIPATION: 0
SHORTNESS OF BREATH: 0
DIARRHEA: 0
SORE THROAT: 0
VOMITING: 0
EYE PAIN: 0
NAUSEA: 0
ABDOMINAL PAIN: 1
CHEST TIGHTNESS: 0
RHINORRHEA: 0
BLOOD IN STOOL: 0

## 2023-08-03 ASSESSMENT — PATIENT HEALTH QUESTIONNAIRE - PHQ9
2. FEELING DOWN, DEPRESSED OR HOPELESS: 2
SUM OF ALL RESPONSES TO PHQ QUESTIONS 1-9: 8
3. TROUBLE FALLING OR STAYING ASLEEP: 1
SUM OF ALL RESPONSES TO PHQ QUESTIONS 1-9: 8
SUM OF ALL RESPONSES TO PHQ QUESTIONS 1-9: 8
9. THOUGHTS THAT YOU WOULD BE BETTER OFF DEAD, OR OF HURTING YOURSELF: 0
8. MOVING OR SPEAKING SO SLOWLY THAT OTHER PEOPLE COULD HAVE NOTICED. OR THE OPPOSITE, BEING SO FIGETY OR RESTLESS THAT YOU HAVE BEEN MOVING AROUND A LOT MORE THAN USUAL: 0
5. POOR APPETITE OR OVEREATING: 0
1. LITTLE INTEREST OR PLEASURE IN DOING THINGS: 1
4. FEELING TIRED OR HAVING LITTLE ENERGY: 2
10. IF YOU CHECKED OFF ANY PROBLEMS, HOW DIFFICULT HAVE THESE PROBLEMS MADE IT FOR YOU TO DO YOUR WORK, TAKE CARE OF THINGS AT HOME, OR GET ALONG WITH OTHER PEOPLE: 0
SUM OF ALL RESPONSES TO PHQ9 QUESTIONS 1 & 2: 3
7. TROUBLE CONCENTRATING ON THINGS, SUCH AS READING THE NEWSPAPER OR WATCHING TELEVISION: 0
6. FEELING BAD ABOUT YOURSELF - OR THAT YOU ARE A FAILURE OR HAVE LET YOURSELF OR YOUR FAMILY DOWN: 2
SUM OF ALL RESPONSES TO PHQ QUESTIONS 1-9: 8

## 2023-08-03 NOTE — PATIENT INSTRUCTIONS
care change as their health changes. If you make big changes to your living will, complete a new form. If you move to another state, make sure that your living will is legal in the state where you now live. In most cases, doctors will respect your wishes even if you have a form from a different state. You might use a universal form that has been approved by many states. This kind of form can sometimes be filled out and stored online. Your digital copy will then be available wherever you have a connection to the internet. The doctors and nurses who need to treat you can find it right away. Your state may offer an online registry. This is another place where you can store your living will online. It's a good idea to get your living will notarized. This means using a person called a PushPage to watch two people sign, or witness, your living will. What should you know when you create a living will? Here are some questions to ask yourself as you make your living will. Do you know enough about life support methods that might be used? If not, talk to your doctor so you know what might be done if you can't breathe on your own, your heart stops, or you can't swallow. What things would you still want to be able to do after you receive life-support methods? Would you want to be able to walk? To speak? To eat on your own? To live without the help of machines? Do you want certain Shinto practices performed if you become very ill? If you have a choice, where do you want to be cared for? In your home? At a hospital or nursing home? If you have a choice at the end of your life, where would you prefer to die? At home? In a hospital or nursing home? Somewhere else? Would you prefer to be buried or cremated? Do you want your organs to be donated after you die? What should you do with your living will?   Make sure that your family members and your health care agent have copies of your living will (also called a

## 2023-08-03 NOTE — PROGRESS NOTES
Medicare Annual Wellness Visit    William Burciaga is here for Medicare AWV    Assessment & Plan   Medicare annual wellness visit, subsequent      Health Maintenance   - immunizations:   Influenza Vaccination  - (9/14/2015) , (2017), (2019), (2020), (2022)   Pneumonia Vaccination - (10/2016) - prevnar, (10/2017) - pneumococcal 23  Zoster/Shingles Vaccine - (2020) x 2   Tetanus Vaccination  - (2015) - [facility]  cov[facility] (1/2021) #1, (2/21/2021) #2, (10/20/2021)#3  (7/17/2022) #4[facility] Ma[facility]dra Records, (1/2023) bivalent pfizer     - Screenings:   Bone Density Test Screening - (10/25/2018)  Colonoscopy - (2014), (1/2020) - Minimal diverticulosis, sigmoid polyp x2 s/p polypectomy 3 to 4 mm, hemrrhoid, path - tubular and hyperplastic,  (6/23) -hepatic flexure polyp transverse colon polyp follow-up 5 years, path tubular adenoma x2    Prostate     Recommendations for Preventive Services Due: see orders and patient instructions/AVS.  Recommended screening schedule for the next 5-10 years is provided to the patient in written form: see Patient Instructions/AVS.     Return for Medicare Annual Wellness Visit in 1 year. Subjective       Patient's complete Health Risk Assessment and screening values have been reviewed and are found in Flowsheets. The following problems were reviewed today and where indicated follow up appointments were made and/or referrals ordered. Positive Risk Factor Screenings with Interventions:        Depression:  PHQ-2 Score: 3  PHQ-9 Total Score: 8    Interpretation:   1-4 = minimal  5-9 = mild  10-14 = moderate  15-19 = moderately severe  20-27 = severe  Interventions: On medication, discussed psychology referral - declines at present   See AVS for additional education material           Opioid Risk: (Low risk score <55) Opioid risk score: 18    Patient is low risk for opioid use disorder or overdose.   Last PDMP Suyapa Blum as Reviewed:  Review User Review Instant Review Result                General HRA Questions:  Select

## 2023-08-03 NOTE — PROGRESS NOTES
issues. States no suicidal thoughts. Discussed counseling - declines. States had increased prozac to 60mg but had diarrhea. Last PHQ score of 5 (2/9/2023). States having panic issues.  has been having panic issues. States added hydroxyzine and takes daily at bedtime. Worsening with mom passing away (2022), with family issues around that and son with cancer diagnosis (2023)     -  had right detached retina. States following with specialist. Using eye drops at moment. States s/p repair (4/2019) and cataract repair.     - Bone density scan showed osteoporosis of hip. Was on fosamax. States stopped - due to stomach pain. Has issues with reclast with stomach. Discussed other option in treatment - different bisphosphonate or prolia. Declines. -  has migraines. Up and down. Still asking about stadol.  has seen neurology - not improved - tried Topamax and Imitrex. Did not tolerateTopamax, Stopped amitriptyline. Taking butalbital/aspirin/caffeine.    -  has asthma and stable with inhaler. States SOB is better. -  has seen chronic pain management, not currently following.  has had injections to right neck for right shoulder x 3 and States had Radio frequency ablation x 1. [facility] has helped b/l shoulder pain. [facility] has had MRI on cervical spine through [address] [place] [address] - multiple osteophytes, disc disease and previous c5-6 fusion.    -  still has lower back pain. Unchanged. No recent trauma or injury. No bowel or bladder incontinence. No fever or chills. No numbness, weakness. States pain as sharp shooting. No radicular pain.     - lab work results from the [address] [place] [address] done (6/2/2023) -Urine drug screen barib poss, chol 126, LDL 78, HDL 31, trig 130, ALT 49, creat 1.2,  CBC negative reviewed with patient 8/3/2023    Health Maintenance   - immunizations:   Influenza Vaccination  - (9/14/2015) , (2017), (2019), (2020), (2022)   Pneumonia Vaccination - (10/2016) - prevnar, (10/2017) - pneumococcal

## 2023-11-29 RX ORDER — HYDROXYZINE HYDROCHLORIDE 25 MG/1
25 TABLET, FILM COATED ORAL 2 TIMES DAILY PRN
Qty: 60 TABLET | Refills: 11 | Status: SHIPPED | OUTPATIENT
Start: 2023-11-29

## 2023-11-29 NOTE — TELEPHONE ENCOUNTER
Last Appointment:  8/3/2023  Future Appointments   Date Time Provider 4600  [address]   2/12/2024 10:00 AM Chad Upton MD [facility] states they never got the rx we sent in August.

## 2023-12-01 LAB
ALBUMIN SERPL-MCNC: 4 G/DL
ALP BLD-CCNC: 74 U/L
ALT SERPL-CCNC: 31 U/L
ANION GAP SERPL CALCULATED.3IONS-SCNC: 9.3 MMOL/L
AST SERPL-CCNC: 25 U/L
BASOPHILS ABSOLUTE: 0.1 /ΜL
BASOPHILS RELATIVE PERCENT: 1.1 %
BILIRUB SERPL-MCNC: 0.8 MG/DL (ref 0.1–1.4)
BILIRUBIN, URINE: NEGATIVE
BLOOD, URINE: NEGATIVE
BUN BLDV-MCNC: 16 MG/DL
CALCIUM SERPL-MCNC: 9.7 MG/DL
CHLORIDE BLD-SCNC: 106 MMOL/L
CHOLESTEROL, TOTAL: 149 MG/DL
CHOLESTEROL/HDL RATIO: ABNORMAL
CLARITY: CLEAR
CO2: 31 MMOL/L
COLOR: YELLOW
CREAT SERPL-MCNC: 1.2 MG/DL
EGFR: 64
EOSINOPHILS ABSOLUTE: 0.3 /ΜL
EOSINOPHILS RELATIVE PERCENT: 2.9 %
GLUCOSE BLD-MCNC: 91 MG/DL
GLUCOSE URINE: ABNORMAL
HCT VFR BLD CALC: 42.9 % (ref 41–53)
HDLC SERPL-MCNC: 30 MG/DL (ref 35–70)
HEMOGLOBIN: 14.8 G/DL (ref 13.5–17.5)
KETONES, URINE: NEGATIVE
LDL CHOLESTEROL CALCULATED: 99 MG/DL (ref 0–160)
LEUKOCYTE ESTERASE, URINE: ABNORMAL
LYMPHOCYTES ABSOLUTE: 2.1 /ΜL
LYMPHOCYTES RELATIVE PERCENT: 23.5 %
MCH RBC QN AUTO: 31.6 PG
MCHC RBC AUTO-ENTMCNC: 34.6 G/DL
MCV RBC AUTO: 91.3 FL
MONOCYTES ABSOLUTE: 0.7 /ΜL
MONOCYTES RELATIVE PERCENT: 8.1 %
NEUTROPHILS ABSOLUTE: 5.7 /ΜL
NEUTROPHILS RELATIVE PERCENT: 64.4 %
NITRITE, URINE: NEGATIVE
NONHDLC SERPL-MCNC: ABNORMAL MG/DL
PH UA: 5.5 (ref 4.5–8)
PLATELET # BLD: 219 K/ΜL
PMV BLD AUTO: 7.6 FL
POTASSIUM SERPL-SCNC: 4.3 MMOL/L
PROSTATE SPECIFIC ANTIGEN: 0.48 NG/ML
PROTEIN UA: ABNORMAL
RBC # BLD: 4.7 10^6/ΜL
SODIUM BLD-SCNC: 142 MMOL/L
SPECIFIC GRAVITY, URINE: 1.03
TOTAL PROTEIN: 6.7
TRIGL SERPL-MCNC: 141 MG/DL
UROBILINOGEN, URINE: NORMAL
VLDLC SERPL CALC-MCNC: ABNORMAL MG/DL
WBC # BLD: 8.8 10^3/ML

## 2023-12-26 ENCOUNTER — OFFICE VISIT (OUTPATIENT)
Dept: ORTHOPEDIC SURGERY | Age: 73
End: 2023-12-26
Payer: MEDICARE

## 2023-12-26 VITALS — HEIGHT: 69 IN | WEIGHT: 221 LBS | BODY MASS INDEX: 32.73 KG/M2

## 2023-12-26 DIAGNOSIS — G89.29 CHRONIC RIGHT SHOULDER PAIN: Primary | ICD-10-CM

## 2023-12-26 DIAGNOSIS — S46.001A INJURY OF RIGHT ROTATOR CUFF, INITIAL ENCOUNTER: ICD-10-CM

## 2023-12-26 DIAGNOSIS — M25.511 CHRONIC RIGHT SHOULDER PAIN: Primary | ICD-10-CM

## 2023-12-26 PROCEDURE — 1123F ACP DISCUSS/DSCN MKR DOCD: CPT | Performed by: NURSE PRACTITIONER

## 2023-12-26 PROCEDURE — 99213 OFFICE O/P EST LOW 20 MIN: CPT | Performed by: NURSE PRACTITIONER

## 2023-12-26 PROCEDURE — 20610 DRAIN/INJ JOINT/BURSA W/O US: CPT | Performed by: NURSE PRACTITIONER

## 2023-12-26 RX ORDER — TRIAMCINOLONE ACETONIDE 40 MG/ML
40 INJECTION, SUSPENSION INTRA-ARTICULAR; INTRAMUSCULAR ONCE
Status: COMPLETED | OUTPATIENT
Start: 2023-12-26 | End: 2023-12-26

## 2023-12-26 RX ORDER — CYCLOSPORINE 0.5 MG/ML
EMULSION OPHTHALMIC
COMMUNITY
Start: 2023-12-04

## 2023-12-26 RX ORDER — LIFITEGRAST 50 MG/ML
SOLUTION/ DROPS OPHTHALMIC
COMMUNITY
Start: 2023-11-15

## 2023-12-26 RX ORDER — BUPIVACAINE HYDROCHLORIDE 2.5 MG/ML
2 INJECTION, SOLUTION INFILTRATION; PERINEURAL ONCE
Status: COMPLETED | OUTPATIENT
Start: 2023-12-26 | End: 2023-12-26

## 2023-12-26 RX ADMIN — TRIAMCINOLONE ACETONIDE 40 MG: 40 INJECTION, SUSPENSION INTRA-ARTICULAR; INTRAMUSCULAR at 13:36

## 2023-12-26 RX ADMIN — BUPIVACAINE HYDROCHLORIDE 5 MG: 2.5 INJECTION, SOLUTION INFILTRATION; PERINEURAL at 13:35

## 2023-12-26 NOTE — PROGRESS NOTES
[facility]   ORTHOPAEDIC SURGERY AND SPORTS MEDICINE  DATE OF VISIT: 12/26/23  Follow Up Shoulder Visit     CHIEF COMPLAINT:   Chief Complaint   Patient presents with    Follow-up     Patient is being seen today for R shoulder pain. Patient states the injection reduced his pain by 60%. HPI:    Rosa Morris is a 68y.o. year old male who presented to the office today for follow up of right shoulder pain, previously evaluated on 4/25/23. Previous treatment has included right shoulder corticosteroid injection. He reports symptoms are improved for quite sometime but pain has returned. The patient has responded to the treatment. REVIEW OF SYSTEMS:     General: Negative Fever, chills, fatigue   Cardiovascular: Negative chest pain, palpitations  Respiratory: Equal chest expansion, negative shortness of breath   Skin: Negative rash, open wounds  Psych: Normal affect, mood stable  Neurologic: sensation grossly intact in extremities   Musculoskeletal: See HPI      Physical Exam:     Height: 1.753 m (5' 9\"), Weight - Scale: 100.2 kg (221 lb)    General: Alert and oriented x3, no acute distress  Cardiovascular/pulmonary: No labored breathing, peripheral perfusion intact  Musculoskeletal:    right shoulder:    negative visualized deformity or swelling. Range of motion is 130/50/IC forward elevation/external rotation/internal rotation    negative tenderness to palpation     Cuff testing:  Mini test (supraspinatus): positive for pain  Speeds test (biceps):  positive for pain   CataÃ±o's test (labral):  positive for pain   Belly press test and lift off test (subscapularis): positive for pain with mild stiffness displayed        Controlled Substances Monitoring:      Imaging:  Previous imaging displays osteoarthritis of the right shoulder    Procedure Note: Shoulder Steroid Injection     The Right shoulder was identified as the injection site.  The risk and benefits of a cortisone injection were explained and the No complaints

## 2024-02-01 DIAGNOSIS — R10.84 GENERALIZED ABDOMINAL PAIN: ICD-10-CM

## 2024-02-01 RX ORDER — OMEPRAZOLE 40 MG/1
40 CAPSULE, DELAYED RELEASE ORAL
Qty: 90 CAPSULE | Refills: 3 | Status: SHIPPED | OUTPATIENT
Start: 2024-02-01

## 2024-02-01 NOTE — TELEPHONE ENCOUNTER
Last Appointment:  8/3/2023  Future Appointments   Date Time Provider Department Center   2/12/2024 10:00 AM Taiwo Alston MD [facility]   3/27/2024 10:20 AM Taiwo Palomo MD [facility]

## 2024-02-05 ENCOUNTER — OFFICE VISIT (OUTPATIENT)
Dept: ORTHOPEDIC SURGERY | Age: 74
End: 2024-02-05
Payer: MEDICARE

## 2024-02-05 VITALS — BODY MASS INDEX: 31.84 KG/M2 | HEIGHT: 69 IN | WEIGHT: 215 LBS

## 2024-02-05 DIAGNOSIS — M25.512 ACUTE PAIN OF LEFT SHOULDER: Primary | ICD-10-CM

## 2024-02-05 PROCEDURE — 1123F ACP DISCUSS/DSCN MKR DOCD: CPT | Performed by: ORTHOPAEDIC SURGERY

## 2024-02-05 PROCEDURE — 99214 OFFICE O/P EST MOD 30 MIN: CPT | Performed by: ORTHOPAEDIC SURGERY

## 2024-02-05 RX ORDER — PREDNISOLONE ACETATE 10 MG/ML
SUSPENSION/ DROPS OPHTHALMIC
COMMUNITY
Start: 2024-01-25

## 2024-02-05 NOTE — PROGRESS NOTES
[facility]   ORTHOPAEDIC SURGERY AND SPORTS MEDICINE  DATE OF VISIT: 02/05/24  New Shoulder Patient Visit     ESTABLISHED PATIENT    CHIEF COMPLAINT:   Chief Complaint   Patient presents with    Shoulder Pain     Left shoulder pain, since 2/2/2024 - states he was stretching while kneeling to reach something and felt a pull, unable to move, pain is anterior aspect down the arm     Results     [place] XR- brought disc    Pain     Lt shld 10 / 10         HPI:      Dean Nam is a 73 y.o. year old male who is seen today for evaluation of left shoulder pain.  Patient reports acute shoulder pain that began on Friday.  Patient states that while kneeling on the ground Heese was supporting his body weight on his left shoulder while reaching out with his right when he felt sharp pain and pulling in his left shoulder.  The following day he was seen and treated in the [place] emergency department.  X-rays obtained showing degenerative changes with no evidence of acute fracture.  He was discharged home with a sling.  Presents to the office today reporting symptoms remain unchanged.  He reports persistent pain with range of motion away from his body.  Reports pain at night.  Denies feelings of instability.  He is right-hand dominant.      PAST MEDICAL HISTORY  Past Medical History:   Diagnosis Date    Abdominal pain 2023    Asthma     Depression     GERD (gastroesophageal reflux disease)     History of colon polyps     Hyperlipidemia     Hypertension     Migraine     Osteoporosis     Right retinal detachment 04/2019       PAST SURGICAL HISTORY  Past Surgical History:   Procedure Laterality Date    CATARACT REMOVAL WITH IMPLANT Right     COLONOSCOPY N/A 1/14/2020    COLONOSCOPY WITH BIOPSY performed by Faisal Desir MD at [facility] ENDOSCOPY    COLONOSCOPY N/A 6/6/2023    COLONOSCOPY WITH BIOPSY performed by Faisal Desir MD at [facility] ENDOSCOPY    FACIAL RECONSTRUCTION SURGERY      NECK SURGERY  1994    RETINAL DETACHMENT SURGERY

## 2024-02-15 ENCOUNTER — TELEPHONE (OUTPATIENT)
Dept: ORTHOPEDIC SURGERY | Age: 74
End: 2024-02-15

## 2024-02-16 ENCOUNTER — TELEPHONE (OUTPATIENT)
Dept: ORTHOPEDIC SURGERY | Age: 74
End: 2024-02-16

## 2024-02-16 DIAGNOSIS — M25.512 ACUTE PAIN OF LEFT SHOULDER: Primary | ICD-10-CM

## 2024-02-16 DIAGNOSIS — S49.92XA INJURY OF LEFT SHOULDER, INITIAL ENCOUNTER: ICD-10-CM

## 2024-02-16 NOTE — TELEPHONE ENCOUNTER
Patient's wife called , states her  is in excruciating pain and they would like to proceed with MRI of the shoulder, left.    Discussed with Dr Palomo. Order was placed.

## 2024-02-26 ENCOUNTER — HOSPITAL ENCOUNTER (OUTPATIENT)
Dept: MRI IMAGING | Age: 74
Discharge: HOME | End: 2024-02-28
Attending: ORTHOPAEDIC SURGERY
Payer: MEDICARE

## 2024-02-26 DIAGNOSIS — M25.512 ACUTE PAIN OF LEFT SHOULDER: ICD-10-CM

## 2024-02-26 DIAGNOSIS — S49.92XA INJURY OF LEFT SHOULDER, INITIAL ENCOUNTER: ICD-10-CM

## 2024-02-26 PROCEDURE — G1010 CDSM STANSON: HCPCS

## 2024-03-01 ENCOUNTER — OFFICE VISIT (OUTPATIENT)
Dept: ORTHOPEDIC SURGERY | Age: 74
End: 2024-03-01

## 2024-03-01 VITALS — HEIGHT: 69 IN | WEIGHT: 215 LBS | BODY MASS INDEX: 31.84 KG/M2

## 2024-03-01 DIAGNOSIS — M25.512 ACUTE PAIN OF LEFT SHOULDER: Primary | ICD-10-CM

## 2024-03-01 RX ORDER — LIDOCAINE HYDROCHLORIDE 10 MG/ML
3 INJECTION, SOLUTION INFILTRATION; PERINEURAL ONCE
Status: COMPLETED | OUTPATIENT
Start: 2024-03-01 | End: 2024-03-01

## 2024-03-01 RX ORDER — BETAMETHASONE SODIUM PHOSPHATE AND BETAMETHASONE ACETATE 3; 3 MG/ML; MG/ML
6 INJECTION, SUSPENSION INTRA-ARTICULAR; INTRALESIONAL; INTRAMUSCULAR; SOFT TISSUE ONCE
Status: COMPLETED | OUTPATIENT
Start: 2024-03-01 | End: 2024-03-01

## 2024-03-01 RX ADMIN — LIDOCAINE HYDROCHLORIDE 3 ML: 10 INJECTION, SOLUTION INFILTRATION; PERINEURAL at 11:03

## 2024-03-01 RX ADMIN — BETAMETHASONE SODIUM PHOSPHATE AND BETAMETHASONE ACETATE 6 MG: 3; 3 INJECTION, SUSPENSION INTRA-ARTICULAR; INTRALESIONAL; INTRAMUSCULAR; SOFT TISSUE at 11:03

## 2024-03-01 NOTE — PROGRESS NOTES
PROCEDURE NOTE:    DIAGNOSIS      LEFT shoulder pain.     PROCEDURE     Ultrasound-guided LEFT proximal biceps tendon sheath corticosteroid injection.     PROCEDURAL PAUSE     Procedural pause conducted to verify: ?correct patient identity, procedure to be performed, and as applicable, correct side and site, correct patient position, and availability of implants, special equipment, or special requirements.     PROCEDURE DETAILS     The procedure was carried out under sterile technique.      Patient Position: ?Supine.     Localization Process: ?The proximal biceps tendon was evaluated under ultrasound prior to starting the procedure. ?The skin was prepped with Betadine and Alcohol.    Approach: ?In-plane.     Local Anesthesia: ?Local anesthesia was obtained with vapocoolant cold spray and 1 cc of 1% lidocaine using a 30-gauge 1-1/4-inch needle to create a skin wheal. ?     Injection/Aspiration: ?A 25-gauge, 2-inch needle was advanced from an in-plane approach into the proximal biceps tendon sheath. ?After visualization of the needle tip in the target area and negative aspiration for blood, a mixture of 3 cc of 1% lidocaine and 1 cc of betamethasone (6 mg/cc) was injected into the tendon sheath with excellent sonographic flow. ?Images of procedure were permanently recorded.    Postprocedure Care: ?The patient will avoid heavy exertion with the shoulder/elbow and avoid soaking the shoulder under water for two days. ?The patient will contact me with any problems related to the injection.     PATIENT EDUCATION     Ready to learn, no apparent learning barriers were identified; learning preferences include listening. ?Explained diagnosis and treatment plan; patient expressed understanding of the content.     INFORMED CONSENT     Discussed the risks, benefits, alternatives, and the necessity of other members of the healthcare team participating in the procedure. ?All questions answered and consent given.     Following

## 2024-03-27 ENCOUNTER — OFFICE VISIT (OUTPATIENT)
Dept: ORTHOPEDIC SURGERY | Age: 74
End: 2024-03-27
Payer: MEDICARE

## 2024-03-27 VITALS — WEIGHT: 215 LBS | HEIGHT: 69 IN | BODY MASS INDEX: 31.84 KG/M2

## 2024-03-27 DIAGNOSIS — G89.29 CHRONIC RIGHT SHOULDER PAIN: Primary | ICD-10-CM

## 2024-03-27 DIAGNOSIS — S49.92XA INJURY OF LEFT SHOULDER, INITIAL ENCOUNTER: ICD-10-CM

## 2024-03-27 DIAGNOSIS — M25.511 CHRONIC RIGHT SHOULDER PAIN: Primary | ICD-10-CM

## 2024-03-27 PROCEDURE — 1123F ACP DISCUSS/DSCN MKR DOCD: CPT | Performed by: ORTHOPAEDIC SURGERY

## 2024-03-27 PROCEDURE — 99214 OFFICE O/P EST MOD 30 MIN: CPT | Performed by: ORTHOPAEDIC SURGERY

## 2024-03-27 NOTE — PROGRESS NOTES
[facility]   ORTHOPAEDIC SURGERY AND SPORTS MEDICINE  DATE OF VISIT: 03/27/24  Follow Up Visit     CHIEF COMPLAINT:   Chief Complaint   Patient presents with    Shoulder Pain     Pt is here today for bilateral shoulder pain, R>L. He states he had a biceps injection in the left side with Dr. Goodman on 3/1. Pt states the injection did not provide him with much relief. He would also like to discuss surgical option for the right shoulder.        HPI:    Dean Nam is a 73 y.o. year old male who presented to the office today for follow up of bilateral shoulders.  His left shoulder is feeling better although still painful.  His right shoulder is still the more severe of the 2.  He is interested in proceeding with surgery for his right shoulder to involve total shoulder replacement.    REVIEW OF SYSTEMS:     Constitutional:  Negative for weight loss, fevers, chills, fatigue  Cardiovascular: Negative for chest pain, palpitations  Pulmonary: Negative for shortness of breath, labored breathing, cough  GI: negative for abdominal pain, nausea, vomiting   MSK: per HPI  Skin: negative for rash, open wounds    All other systems reviewed and are negative       Physical Exam:     Height: 1.753 m (5' 9\"), Weight - Scale: 97.5 kg (215 lb)    General: Alert and oriented x3, no acute distress  Cardiovascular/pulmonary: No labored breathing, peripheral perfusion intact  Musculoskeletal:    On exam of the right shoulder, he has restricted active range of motion due to pain but does have maintained strength with rotator cuff testing.  There is mild tenderness anterior over the biceps.  Skin is intact.  Motor and sensory function is intact throughout the extremity    Controlled Substances Monitoring:      Imaging:  X-rays and MRI reviewed of both shoulders.  Right shoulder shows glenohumeral arthritis, partial tearing of the rotator cuff but grossly intact.  Significant biceps tendinosis      Assessment: Right shoulder

## 2024-03-28 ENCOUNTER — TELEPHONE (OUTPATIENT)
Dept: ORTHOPEDIC SURGERY | Age: 74
End: 2024-03-28

## 2024-03-28 DIAGNOSIS — M19.011 PRIMARY OSTEOARTHRITIS OF RIGHT SHOULDER: Primary | ICD-10-CM

## 2024-03-28 NOTE — TELEPHONE ENCOUNTER
[facility]  ORTHOPAEDIC SURGERY SCHEDULING NOTE    Patient wishes to proceed after surgery discussion with Dr. Palomo.     Surgical education was discussed and patient was given the surgical handout. Pre/post-op appointments were made as needed. Patient is also aware to obtain any medical clearances prior to surgery, if requested. Notified that pre-admission testing will also be reaching out for education and instructions on arrival time prior to procedure.     Patient is to obtain clearance(s) from: MEDICAL     Authorization submitted to [employer]    Status: PENDING AVAILITY   Request ID#  [id number]    Surgery: Right Total Shoulder Arthroplasty vs. Reverse   OR DATE: 5/13/24 [facility]   Vendor: [facility]   Block: ISB  CPT: 53399  DX: M19.011  Special Needs (if applicable): CT, SAME DAY DC       Scheduled: [facility] OR Staff on 3/28

## 2024-04-15 ENCOUNTER — OFFICE VISIT (OUTPATIENT)
Dept: FAMILY MEDICINE CLINIC | Age: 74
End: 2024-04-15
Payer: MEDICARE

## 2024-04-15 VITALS
BODY MASS INDEX: 33.03 KG/M2 | HEART RATE: 74 BPM | TEMPERATURE: 98.2 F | SYSTOLIC BLOOD PRESSURE: 130 MMHG | WEIGHT: 223 LBS | OXYGEN SATURATION: 96 % | DIASTOLIC BLOOD PRESSURE: 74 MMHG | HEIGHT: 69 IN | RESPIRATION RATE: 18 BRPM

## 2024-04-15 DIAGNOSIS — Z01.818 PRE-OP EXAM: Primary | ICD-10-CM

## 2024-04-15 PROCEDURE — 3075F SYST BP GE 130 - 139MM HG: CPT | Performed by: INTERNAL MEDICINE

## 2024-04-15 PROCEDURE — 1123F ACP DISCUSS/DSCN MKR DOCD: CPT | Performed by: INTERNAL MEDICINE

## 2024-04-15 PROCEDURE — 93000 ELECTROCARDIOGRAM COMPLETE: CPT | Performed by: INTERNAL MEDICINE

## 2024-04-15 PROCEDURE — 99213 OFFICE O/P EST LOW 20 MIN: CPT | Performed by: INTERNAL MEDICINE

## 2024-04-15 PROCEDURE — 3078F DIAST BP <80 MM HG: CPT | Performed by: INTERNAL MEDICINE

## 2024-04-15 ASSESSMENT — ENCOUNTER SYMPTOMS
SHORTNESS OF BREATH: 0
RHINORRHEA: 0
COUGH: 0
EYE PAIN: 0
CHEST TIGHTNESS: 0
CONSTIPATION: 0
DIARRHEA: 0
ABDOMINAL PAIN: 1
BLOOD IN STOOL: 0
VOMITING: 0
SORE THROAT: 0
NAUSEA: 0

## 2024-04-15 NOTE — PROGRESS NOTES
[facility]   Internal Medicine     4/15/2024  Dean Nam : 1950 Sex: male  Age:73 y.o.    Chief Complaint   Patient presents with    Pre-op Exam     Rt shoulder 24 with Dr. Palomo         HPI:   Patient presents to office for evaluation of the following medical concerns.    - States son was diagnosed with cancer.     - State pain to bilateral shoulders. States had right shoulder injection per ortho ().  States had MRI on right, shows partial tears, fluid and degenerative changes. [facility], Was seen in ER () - pain, xr lt shoulder moderate joint space narrowing of the left glenohumeral joint with bony spurring,  Moderate osteoarthritic changes, er treatment norco, declines splint, rice treatment. Has seen orthopedics. Reviewed note Ortho (3/24) - b/l shoulder pain injections have not helped ordering CT scan of the shoulder for surgical planning, planning total shoulder arthroplasty versus reverse total shoulder arthroplasty    - States pain to left knee. States still with pain. States taking glucosamine. Stable      - States having generalized abdominal pain, has improved and stable. Found duodenal stricture. States has had EGD and colonoscopy (2020)- showed gastritis and duodenal stricture, had follow up EGD and had dilation (2020)  - referred to endo surgery. Had EGD with [facility] (10/20) - Duodenal stricture just distal to the duodenal bulb path - blood with few benign glandular cells. States some left sided soreness. Last EGD () -prior duodenal stricture patent but friable no dilation, duodenal diverticulum, mild gastritis, esophagus normal, path reactive gastropathy mild duodenum negative On omeprazole. No nausea, no vomiting. No diarrhea or constipation. No melena or hematochezia.     - States has hypertension. States does check blood pressure at home, blood pressure 120-130's/70-80. Occasional in 140's. On amlodipine,

## 2024-04-23 ENCOUNTER — HOSPITAL ENCOUNTER (OUTPATIENT)
Dept: CT IMAGING | Age: 74
Discharge: HOME | End: 2024-04-25
Attending: ORTHOPAEDIC SURGERY
Payer: MEDICARE

## 2024-04-23 ENCOUNTER — TELEPHONE (OUTPATIENT)
Dept: ORTHOPEDIC SURGERY | Age: 74
End: 2024-04-23

## 2024-04-23 DIAGNOSIS — M25.511 CHRONIC RIGHT SHOULDER PAIN: ICD-10-CM

## 2024-04-23 DIAGNOSIS — G89.29 CHRONIC RIGHT SHOULDER PAIN: ICD-10-CM

## 2024-04-23 PROCEDURE — 73200 CT UPPER EXTREMITY W/O DYE: CPT

## 2024-04-23 NOTE — TELEPHONE ENCOUNTER
Received denial letter regarding patients CT scan, spoke with representative from [facility] -- appeal process submitted yesterday with updated clinical information. CT was denied d/t \"experimental robotic surgery\" we do not use the robot with our shoulder arthroplasty, CT is used to determine if pt will be anatomic or reverse, denial is incorrect.     Spoke with wife, pt is scheduled today for his CT. He would like to proceed.

## 2024-04-26 NOTE — DISCHARGE INSTRUCTIONS
DISCHARGE INSTRUCTIONS FOR TOTAL SHOULDER REPLACEMENT        Incision Care:   You may shower - Your dressing is water proof.  You can shower with your dressing on.  After one week, remove your dressing and leave your incision open to air.   REMEMBER to let water roll over incision. Incision line is    healing and tender - protect from HOT water. No need to wash incision with soap   and water.  Pat incision dry with clean hand towel or let air dry.   DO NOT take baths, go in swimming pools/hot tubs/lakes, or submerge your operative arm under water until you are cleared by Dr. Palomo.      Do not apply creams, liniments, lotions or ointments directly on incision line.   If incision is draining, keep covered with sterile gauze.  Change dressing daily and each time you shower.    Do not touch incision line with your fingers or scratch incision with your   fingernails (your fingernails harbor germs and bacteria).   Do not allow pets near your incision - do not allow pets to smell or lick incision.    Activity:   Remain in sling at all times until follow up.  You can remove to shower, keep arm at side.  Do not use the arm to lift.  You can do elbow, wrist, and hand motion exercises including squeezing a ball.  No active or passive shoulder motion.    Apply ICE to the shoulder as much as possible.  It will likely be most comfortable for you to sleep sitting up in a recliner.          Signs and symptoms to report to your doctor:     Persistent drainage from the incision.   Increased redness or swelling of the incision or operative extremity.   Increased or excessive pain at the incision site    Fever over 101 degrees with chills (take your temperature at home and   Record).    Go to Emergency Room if you experience any of the following:     Chest pain or tightness   Shortness of breath or difficulty breathing   Anxiety or feeling of impending doom   Note: The above are signs and symptoms of a blood clot in your

## 2024-05-06 ENCOUNTER — ANESTHESIA EVENT (OUTPATIENT)
Dept: OPERATING ROOM | Age: 74
End: 2024-05-06
Payer: MEDICARE

## 2024-05-06 ENCOUNTER — OFFICE VISIT (OUTPATIENT)
Dept: ORTHOPEDIC SURGERY | Age: 74
End: 2024-05-06
Payer: MEDICARE

## 2024-05-06 ENCOUNTER — HOSPITAL ENCOUNTER (OUTPATIENT)
Dept: PREADMISSION TESTING | Age: 74
Discharge: HOME | End: 2024-05-06
Payer: MEDICARE

## 2024-05-06 VITALS
OXYGEN SATURATION: 94 % | HEART RATE: 72 BPM | SYSTOLIC BLOOD PRESSURE: 117 MMHG | DIASTOLIC BLOOD PRESSURE: 63 MMHG | BODY MASS INDEX: 33.47 KG/M2 | WEIGHT: 226 LBS | HEIGHT: 69 IN | RESPIRATION RATE: 22 BRPM | TEMPERATURE: 97 F

## 2024-05-06 VITALS — HEIGHT: 69 IN | BODY MASS INDEX: 31.84 KG/M2 | WEIGHT: 215 LBS

## 2024-05-06 DIAGNOSIS — Z01.818 PRE-OP TESTING: ICD-10-CM

## 2024-05-06 DIAGNOSIS — M19.011 PRIMARY OSTEOARTHRITIS OF RIGHT SHOULDER: Primary | ICD-10-CM

## 2024-05-06 DIAGNOSIS — S49.92XA INJURY OF LEFT SHOULDER, INITIAL ENCOUNTER: ICD-10-CM

## 2024-05-06 DIAGNOSIS — Z01.818 PRE-OP EXAM: ICD-10-CM

## 2024-05-06 LAB
ANION GAP SERPL CALCULATED.3IONS-SCNC: 6 MMOL/L (ref 7–16)
BASOPHILS # BLD: 0.06 K/UL (ref 0–0.2)
BASOPHILS NFR BLD: 1 % (ref 0–2)
BUN SERPL-MCNC: 27 MG/DL (ref 6–23)
CALCIUM SERPL-MCNC: 9.2 MG/DL (ref 8.6–10.2)
CHLORIDE SERPL-SCNC: 104 MMOL/L (ref 98–107)
CO2 SERPL-SCNC: 30 MMOL/L (ref 22–29)
CREAT SERPL-MCNC: 1.1 MG/DL (ref 0.7–1.2)
EOSINOPHIL # BLD: 0.34 K/UL (ref 0.05–0.5)
EOSINOPHILS RELATIVE PERCENT: 4 % (ref 0–6)
ERYTHROCYTE [DISTWIDTH] IN BLOOD BY AUTOMATED COUNT: 12.6 % (ref 11.5–15)
GFR, ESTIMATED: 71 ML/MIN/1.73M2
GLUCOSE SERPL-MCNC: 105 MG/DL (ref 74–99)
HCT VFR BLD AUTO: 42.4 % (ref 37–54)
HGB BLD-MCNC: 14.5 G/DL (ref 12.5–16.5)
IMM GRANULOCYTES # BLD AUTO: 0.03 K/UL (ref 0–0.58)
IMM GRANULOCYTES NFR BLD: 0 % (ref 0–5)
LYMPHOCYTES NFR BLD: 1.89 K/UL (ref 1.5–4)
LYMPHOCYTES RELATIVE PERCENT: 21 % (ref 20–42)
MCH RBC QN AUTO: 31.9 PG (ref 26–35)
MCHC RBC AUTO-ENTMCNC: 34.2 G/DL (ref 32–34.5)
MCV RBC AUTO: 93.2 FL (ref 80–99.9)
MONOCYTES NFR BLD: 0.85 K/UL (ref 0.1–0.95)
MONOCYTES NFR BLD: 10 % (ref 2–12)
NEUTROPHILS NFR BLD: 64 % (ref 43–80)
NEUTS SEG NFR BLD: 5.65 K/UL (ref 1.8–7.3)
PLATELET # BLD AUTO: 211 K/UL (ref 130–450)
PMV BLD AUTO: 9.4 FL (ref 7–12)
POTASSIUM SERPL-SCNC: 4.7 MMOL/L (ref 3.5–5)
PREALB SERPL-MCNC: 24 MG/DL (ref 20–40)
RBC # BLD AUTO: 4.55 M/UL (ref 3.8–5.8)
SODIUM SERPL-SCNC: 140 MMOL/L (ref 132–146)
WBC OTHER # BLD: 8.8 K/UL (ref 4.5–11.5)

## 2024-05-06 PROCEDURE — 80048 BASIC METABOLIC PNL TOTAL CA: CPT

## 2024-05-06 PROCEDURE — 36415 COLL VENOUS BLD VENIPUNCTURE: CPT

## 2024-05-06 PROCEDURE — 87081 CULTURE SCREEN ONLY: CPT

## 2024-05-06 PROCEDURE — 84134 ASSAY OF PREALBUMIN: CPT

## 2024-05-06 PROCEDURE — 85025 COMPLETE CBC W/AUTO DIFF WBC: CPT

## 2024-05-06 PROCEDURE — 20610 DRAIN/INJ JOINT/BURSA W/O US: CPT | Performed by: ORTHOPAEDIC SURGERY

## 2024-05-06 PROCEDURE — 99213 OFFICE O/P EST LOW 20 MIN: CPT | Performed by: ORTHOPAEDIC SURGERY

## 2024-05-06 PROCEDURE — 1123F ACP DISCUSS/DSCN MKR DOCD: CPT | Performed by: ORTHOPAEDIC SURGERY

## 2024-05-06 RX ORDER — MIDAZOLAM HYDROCHLORIDE 2 MG/2ML
1 INJECTION, SOLUTION INTRAMUSCULAR; INTRAVENOUS EVERY 5 MIN PRN
OUTPATIENT
Start: 2024-05-06

## 2024-05-06 RX ORDER — ROPIVACAINE HYDROCHLORIDE 5 MG/ML
30 INJECTION, SOLUTION EPIDURAL; INFILTRATION; PERINEURAL
OUTPATIENT
Start: 2024-05-06 | End: 2024-05-07

## 2024-05-06 RX ORDER — TRIAMCINOLONE ACETONIDE 40 MG/ML
40 INJECTION, SUSPENSION INTRA-ARTICULAR; INTRAMUSCULAR ONCE
Status: COMPLETED | OUTPATIENT
Start: 2024-05-06 | End: 2024-05-06

## 2024-05-06 RX ORDER — BUPIVACAINE HYDROCHLORIDE 2.5 MG/ML
2 INJECTION, SOLUTION INFILTRATION; PERINEURAL ONCE
Status: COMPLETED | OUTPATIENT
Start: 2024-05-06 | End: 2024-05-06

## 2024-05-06 RX ADMIN — BUPIVACAINE HYDROCHLORIDE 5 MG: 2.5 INJECTION, SOLUTION INFILTRATION; PERINEURAL at 13:02

## 2024-05-06 RX ADMIN — TRIAMCINOLONE ACETONIDE 40 MG: 40 INJECTION, SUSPENSION INTRA-ARTICULAR; INTRAMUSCULAR at 13:03

## 2024-05-06 ASSESSMENT — PAIN SCALES - GENERAL: PAINLEVEL_OUTOF10: 10

## 2024-05-06 ASSESSMENT — PAIN DESCRIPTION - PAIN TYPE: TYPE: CHRONIC PAIN

## 2024-05-06 ASSESSMENT — PAIN DESCRIPTION - FREQUENCY: FREQUENCY: CONTINUOUS

## 2024-05-06 ASSESSMENT — PAIN - FUNCTIONAL ASSESSMENT: PAIN_FUNCTIONAL_ASSESSMENT: PREVENTS OR INTERFERES WITH MANY ACTIVE NOT PASSIVE ACTIVITIES

## 2024-05-06 ASSESSMENT — PAIN DESCRIPTION - LOCATION: LOCATION: SHOULDER

## 2024-05-06 ASSESSMENT — PAIN DESCRIPTION - ORIENTATION: ORIENTATION: RIGHT

## 2024-05-06 NOTE — ANESTHESIA PRE PROCEDURE
Provider, MD Jassi   lisinopril (PRINIVIL;ZESTRIL) 20 MG tablet Take 1 tablet by mouth 2 times daily    Jassi Babcock MD   atenolol (TENORMIN) 100 MG tablet Take 1 tablet by mouth every morning    ProviderJassi MD       Current medications:    Current Outpatient Medications   Medication Sig Dispense Refill    Multiple Vitamins-Minerals (MULTIVITAMIN ADULTS PO) Take by mouth daily      omeprazole (PRILOSEC) 40 MG delayed release capsule Take 1 capsule by mouth every morning (before breakfast) 90 capsule 3    cycloSPORINE (RESTASIS) 0.05 % ophthalmic emulsion USE ONE DROP INTO EACH EYE TWICE DAILY      hydrOXYzine HCl (ATARAX) 25 MG tablet Take 1 tablet by mouth 2 times daily as needed for Itching (Patient taking differently: Take 1 tablet by mouth 2 times daily) 60 tablet 11    FLUoxetine (PROZAC) 40 MG capsule Take 1 capsule by mouth every morning 90 capsule 3    atorvastatin (LIPITOR) 20 MG tablet Take 1 tablet by mouth daily 90 tablet 3    amLODIPine (NORVASC) 10 MG tablet Take 1 tablet by mouth every morning 90 tablet 3    butalbital-acetaminophen-caffeine (FIORICET, ESGIC) -40 MG per tablet Take 1 tablet by mouth every 4 hours as needed for Headaches      aspirin 81 MG tablet Take 1 tablet by mouth daily      albuterol sulfate HFA (PROVENTIL;VENTOLIN;PROAIR) 108 (90 Base) MCG/ACT inhaler Inhale into the lungs every 4 hours as needed Instructed to take am of procedure if needed and bring dos      oxyCODONE-acetaminophen (PERCOCET) 5-325 MG per tablet Take 1 tablet by mouth 2 times daily. Instructed to take am of procedure      lisinopril (PRINIVIL;ZESTRIL) 20 MG tablet Take 1 tablet by mouth 2 times daily      atenolol (TENORMIN) 100 MG tablet Take 1 tablet by mouth every morning       No current facility-administered medications for this encounter.       Allergies:    Allergies   Allergen Reactions    Fentanyl Nausea And Vomiting, Anxiety and Other (See Comments)     PATCHES

## 2024-05-06 NOTE — PROGRESS NOTES
[facility] PRE-ADMISSION TESTING INSTRUCTIONS    The Preadmission Testing patient is instructed accordingly using the following criteria (check applicable):    ARRIVAL INSTRUCTIONS:  [x] Parking the day of Surgery is located in the Main Entrance lot.  Upon entering the door, make an immediate right to the surgery reception desk    [x] Bring photo ID and insurance card    [x] Bring in a copy of Living will or Durable Power of  papers.    [x] Please be sure to arrange for responsible adult to provide transportation to and from the hospital    [x] Please arrange for responsible adult to be with you for the 24 hour period post procedure due to having anesthesia    [x] If you awake am of surgery not feeling well or have temperature >100 please call [phone]    GENERAL INSTRUCTIONS:    [x] Follow instructions for hydration that have been provided to you at your Pre-Admission Visit. Solid food until midnight then clear liquids. No gum, candy or mints.    [x] You may brush your teeth, but do not swallow any water  Percocet if need    [x] Take medications as instructed with 1-2 oz of water  Norvasc  Atenolol  Prozac  Hydroxyzine  Omeprazole    [x] Bring inhalers day of surgery    [x] Stop herbal supplements and vitamins 5 days prior to procedure    [x] Follow preop dosing of blood thinners per physician instructions    [x] No tobacco products within 24 hours of surgery     [x] No alcohol or illegal drug use within 24 hours of surgery.    [x] Jewelry, body piercing's, eyeglasses, contact lenses and dentures are not permitted into surgery (bring cases)      [x] Please do not wear any nail polish, make up or hair products on the day of surgery    [x] You can expect a call the business day prior to procedure to notify you if your arrival time changes    [x] If you receive a survey after surgery we would greatly appreciate your comments    [x] Please notify surgeon if you develop any illness

## 2024-05-06 NOTE — PROGRESS NOTES
Department of Orthopedic Surgery  Attending Pre-operative History and Physical        DIAGNOSIS:   Right shoulder osteoarthritis     INDICATION:  Failed Conservative Management     PROCEDURE:  RIGHT TOTAL SHOULDER ARTHROPLASTY VS. REVERSE       CHIEF COMPLAINT:  Right shoulder pain    History Obtained From:  patient    HISTORY OF PRESENT ILLNESS:      The patient is a 73 y.o. male with significant past medical history of right shoulder osteoarthritis who presents with right shoulder pain.  Patient has failed conservative treatment which has included active modification, anti-inflammatories, corticosteroid injections.  Imaging displays osteoarthritis of the right shoulder.  Given his failure to improve with conservative treatment he is interested in proceeding with surgery in the form of a right total shoulder arthroplasty versus reverse. The risks, benefits, and alternatives to the procedure were discussed at length with the patient and family members.  Risks include but are not limited to infection, neurovascular injury, failure of hardware, intraoperative or postoperative fracture, need for revision surgery, dislocation, need for revision surgery, arthrofibrosis, blood clots, and risks of general anesthesia. Patient verbalizes understanding of the risks and wishes to proceed.       Past Medical History:        Diagnosis Date    Abdominal pain 2023    Arthritis     Asthma     Depression     GERD (gastroesophageal reflux disease)     History of colon polyps     Hyperlipidemia     Hypertension     Migraine     Osteoporosis     Right retinal detachment 04/2019    Shoulder pain, right        Past Surgical History:        Procedure Laterality Date    CATARACT REMOVAL WITH IMPLANT Right     COLONOSCOPY N/A 1/14/2020    COLONOSCOPY WITH BIOPSY performed by Faisal Desir MD at [facility] ENDOSCOPY    COLONOSCOPY N/A 6/6/2023    COLONOSCOPY WITH BIOPSY performed by Faisal Desir MD at [facility] ENDOSCOPY    FACIAL RECONSTRUCTION

## 2024-05-08 LAB
MICROORGANISM SPEC CULT: NORMAL
SPECIMEN DESCRIPTION: NORMAL

## 2024-05-13 ENCOUNTER — APPOINTMENT (OUTPATIENT)
Dept: GENERAL RADIOLOGY | Age: 74
End: 2024-05-13
Attending: ORTHOPAEDIC SURGERY
Payer: MEDICARE

## 2024-05-13 ENCOUNTER — ANESTHESIA (OUTPATIENT)
Dept: OPERATING ROOM | Age: 74
End: 2024-05-13
Payer: MEDICARE

## 2024-05-13 ENCOUNTER — HOSPITAL ENCOUNTER (OUTPATIENT)
Age: 74
Setting detail: OBSERVATION
Discharge: HOME | End: 2024-05-14
Attending: ORTHOPAEDIC SURGERY | Admitting: ORTHOPAEDIC SURGERY
Payer: MEDICARE

## 2024-05-13 DIAGNOSIS — Z01.818 PRE-OP TESTING: Primary | ICD-10-CM

## 2024-05-13 DIAGNOSIS — M19.011 PRIMARY OSTEOARTHRITIS OF RIGHT SHOULDER: ICD-10-CM

## 2024-05-13 DIAGNOSIS — Z96.611 S/P REVERSE TOTAL SHOULDER ARTHROPLASTY, RIGHT: ICD-10-CM

## 2024-05-13 PROCEDURE — C1776 JOINT DEVICE (IMPLANTABLE): HCPCS | Performed by: ORTHOPAEDIC SURGERY

## 2024-05-13 PROCEDURE — 2500000003 HC RX 250 WO HCPCS: Performed by: NURSE ANESTHETIST, CERTIFIED REGISTERED

## 2024-05-13 PROCEDURE — 2580000003 HC RX 258

## 2024-05-13 PROCEDURE — 88311 DECALCIFY TISSUE: CPT

## 2024-05-13 PROCEDURE — L3650 SO 8 ABD RESTRAINT PRE OTS: HCPCS | Performed by: ORTHOPAEDIC SURGERY

## 2024-05-13 PROCEDURE — G0378 HOSPITAL OBSERVATION PER HR: HCPCS

## 2024-05-13 PROCEDURE — 2500000003 HC RX 250 WO HCPCS

## 2024-05-13 PROCEDURE — 2580000003 HC RX 258: Performed by: ORTHOPAEDIC SURGERY

## 2024-05-13 PROCEDURE — 23472 RECONSTRUCT SHOULDER JOINT: CPT | Performed by: ORTHOPAEDIC SURGERY

## 2024-05-13 PROCEDURE — 2709999900 HC NON-CHARGEABLE SUPPLY: Performed by: ORTHOPAEDIC SURGERY

## 2024-05-13 PROCEDURE — 3700000001 HC ADD 15 MINUTES (ANESTHESIA): Performed by: ORTHOPAEDIC SURGERY

## 2024-05-13 PROCEDURE — 6370000000 HC RX 637 (ALT 250 FOR IP)

## 2024-05-13 PROCEDURE — 73030 X-RAY EXAM OF SHOULDER: CPT

## 2024-05-13 PROCEDURE — 6360000002 HC RX W HCPCS: Performed by: NURSE ANESTHETIST, CERTIFIED REGISTERED

## 2024-05-13 PROCEDURE — 64415 NJX AA&/STRD BRCH PLXS IMG: CPT | Performed by: ANESTHESIOLOGY

## 2024-05-13 PROCEDURE — C1713 ANCHOR/SCREW BN/BN,TIS/BN: HCPCS | Performed by: ORTHOPAEDIC SURGERY

## 2024-05-13 PROCEDURE — 6360000002 HC RX W HCPCS: Performed by: ORTHOPAEDIC SURGERY

## 2024-05-13 PROCEDURE — 3600000005 HC SURGERY LEVEL 5 BASE: Performed by: ORTHOPAEDIC SURGERY

## 2024-05-13 PROCEDURE — 7100000000 HC PACU RECOVERY - FIRST 15 MIN: Performed by: ORTHOPAEDIC SURGERY

## 2024-05-13 PROCEDURE — 6360000002 HC RX W HCPCS: Performed by: ANESTHESIOLOGY

## 2024-05-13 PROCEDURE — 2580000003 HC RX 258: Performed by: NURSE ANESTHETIST, CERTIFIED REGISTERED

## 2024-05-13 PROCEDURE — 7100000001 HC PACU RECOVERY - ADDTL 15 MIN: Performed by: ORTHOPAEDIC SURGERY

## 2024-05-13 PROCEDURE — 88304 TISSUE EXAM BY PATHOLOGIST: CPT

## 2024-05-13 PROCEDURE — 3700000000 HC ANESTHESIA ATTENDED CARE: Performed by: ORTHOPAEDIC SURGERY

## 2024-05-13 PROCEDURE — 2500000003 HC RX 250 WO HCPCS: Performed by: NURSE PRACTITIONER

## 2024-05-13 PROCEDURE — 96372 THER/PROPH/DIAG INJ SC/IM: CPT

## 2024-05-13 PROCEDURE — 3600000015 HC SURGERY LEVEL 5 ADDTL 15MIN: Performed by: ORTHOPAEDIC SURGERY

## 2024-05-13 PROCEDURE — 6360000002 HC RX W HCPCS

## 2024-05-13 PROCEDURE — 2720000010 HC SURG SUPPLY STERILE: Performed by: ORTHOPAEDIC SURGERY

## 2024-05-13 PROCEDURE — 6370000000 HC RX 637 (ALT 250 FOR IP): Performed by: ORTHOPAEDIC SURGERY

## 2024-05-13 DEVICE — UNIVERS REVERS SUTURE CUP, 36 (+2 RIGHT)
Type: IMPLANTABLE DEVICE | Site: SHOULDER | Status: FUNCTIONAL
Brand: ARTHREX®

## 2024-05-13 DEVICE — MODULAR POST, 25MM
Type: IMPLANTABLE DEVICE | Site: SHOULDER | Status: FUNCTIONAL
Brand: ARTHREX®

## 2024-05-13 DEVICE — HUMERAL INSERT S/36 +3 TO FIT IN 36 CUP
Type: IMPLANTABLE DEVICE | Site: SHOULDER | Status: FUNCTIONAL
Brand: ARTHREX®

## 2024-05-13 DEVICE — 5.5X20MM PERIPHERAL SCREW, LOCKING
Type: IMPLANTABLE DEVICE | Site: SHOULDER | Status: FUNCTIONAL
Brand: ARTHREX®

## 2024-05-13 DEVICE — 36 +4 LAT/24 GLENOSPHERE
Type: IMPLANTABLE DEVICE | Site: SHOULDER | Status: FUNCTIONAL
Brand: ARTHREX®

## 2024-05-13 DEVICE — 5.5X16MM PERIPHERAL SCREW, LOCKING
Type: IMPLANTABLE DEVICE | Site: SHOULDER | Status: FUNCTIONAL
Brand: ARTHREX®

## 2024-05-13 DEVICE — 5.5X24MM PERIPHERAL SCREW, LOCKING
Type: IMPLANTABLE DEVICE | Site: SHOULDER | Status: FUNCTIONAL
Brand: ARTHREX®

## 2024-05-13 DEVICE — UNIVERS REVERS APEX STEM, SIZE 10
Type: IMPLANTABLE DEVICE | Site: SHOULDER | Status: FUNCTIONAL
Brand: ARTHREX®

## 2024-05-13 DEVICE — 24MM BASEPLATE,  20° FULL AUG, ST
Type: IMPLANTABLE DEVICE | Site: SHOULDER | Status: FUNCTIONAL
Brand: ARTHREX®

## 2024-05-13 RX ORDER — GLYCOPYRROLATE 0.2 MG/ML
INJECTION INTRAMUSCULAR; INTRAVENOUS PRN
Status: DISCONTINUED | OUTPATIENT
Start: 2024-05-13 | End: 2024-05-13 | Stop reason: SDUPTHER

## 2024-05-13 RX ORDER — ONDANSETRON 4 MG/1
4 TABLET, ORALLY DISINTEGRATING ORAL EVERY 8 HOURS PRN
Status: DISCONTINUED | OUTPATIENT
Start: 2024-05-13 | End: 2024-05-14 | Stop reason: HOSPADM

## 2024-05-13 RX ORDER — SODIUM CHLORIDE 0.9 % (FLUSH) 0.9 %
5-40 SYRINGE (ML) INJECTION EVERY 12 HOURS SCHEDULED
Status: DISCONTINUED | OUTPATIENT
Start: 2024-05-13 | End: 2024-05-14 | Stop reason: HOSPADM

## 2024-05-13 RX ORDER — NALOXONE HYDROCHLORIDE 0.4 MG/ML
INJECTION, SOLUTION INTRAMUSCULAR; INTRAVENOUS; SUBCUTANEOUS PRN
Status: DISCONTINUED | OUTPATIENT
Start: 2024-05-13 | End: 2024-05-13 | Stop reason: HOSPADM

## 2024-05-13 RX ORDER — OXYCODONE HYDROCHLORIDE 5 MG/1
10 TABLET ORAL EVERY 4 HOURS PRN
Status: DISCONTINUED | OUTPATIENT
Start: 2024-05-13 | End: 2024-05-14 | Stop reason: HOSPADM

## 2024-05-13 RX ORDER — MIDAZOLAM HYDROCHLORIDE 2 MG/2ML
1 INJECTION, SOLUTION INTRAMUSCULAR; INTRAVENOUS EVERY 5 MIN PRN
Status: DISCONTINUED | OUTPATIENT
Start: 2024-05-13 | End: 2024-05-13 | Stop reason: HOSPADM

## 2024-05-13 RX ORDER — PROCHLORPERAZINE EDISYLATE 5 MG/ML
5 INJECTION INTRAMUSCULAR; INTRAVENOUS
Status: DISCONTINUED | OUTPATIENT
Start: 2024-05-13 | End: 2024-05-13 | Stop reason: HOSPADM

## 2024-05-13 RX ORDER — SODIUM CHLORIDE 9 MG/ML
INJECTION, SOLUTION INTRAVENOUS PRN
Status: DISCONTINUED | OUTPATIENT
Start: 2024-05-13 | End: 2024-05-13 | Stop reason: HOSPADM

## 2024-05-13 RX ORDER — TRANEXAMIC ACID 10 MG/ML
INJECTION, SOLUTION INTRAVENOUS
Status: COMPLETED
Start: 2024-05-13 | End: 2024-05-14

## 2024-05-13 RX ORDER — MORPHINE SULFATE 2 MG/ML
2 INJECTION, SOLUTION INTRAMUSCULAR; INTRAVENOUS
Status: DISCONTINUED | OUTPATIENT
Start: 2024-05-13 | End: 2024-05-14 | Stop reason: HOSPADM

## 2024-05-13 RX ORDER — ROCURONIUM BROMIDE 10 MG/ML
INJECTION, SOLUTION INTRAVENOUS PRN
Status: DISCONTINUED | OUTPATIENT
Start: 2024-05-13 | End: 2024-05-13 | Stop reason: SDUPTHER

## 2024-05-13 RX ORDER — SODIUM CHLORIDE 0.9 % (FLUSH) 0.9 %
5-40 SYRINGE (ML) INJECTION PRN
Status: DISCONTINUED | OUTPATIENT
Start: 2024-05-13 | End: 2024-05-13 | Stop reason: HOSPADM

## 2024-05-13 RX ORDER — MORPHINE SULFATE 4 MG/ML
4 INJECTION, SOLUTION INTRAMUSCULAR; INTRAVENOUS
Status: DISCONTINUED | OUTPATIENT
Start: 2024-05-13 | End: 2024-05-14 | Stop reason: HOSPADM

## 2024-05-13 RX ORDER — SODIUM CHLORIDE 0.9 % (FLUSH) 0.9 %
5-40 SYRINGE (ML) INJECTION PRN
Status: DISCONTINUED | OUTPATIENT
Start: 2024-05-13 | End: 2024-05-14 | Stop reason: HOSPADM

## 2024-05-13 RX ORDER — SODIUM CHLORIDE 0.9 % (FLUSH) 0.9 %
5-40 SYRINGE (ML) INJECTION EVERY 12 HOURS SCHEDULED
Status: DISCONTINUED | OUTPATIENT
Start: 2024-05-13 | End: 2024-05-13 | Stop reason: HOSPADM

## 2024-05-13 RX ORDER — ROPIVACAINE HYDROCHLORIDE 5 MG/ML
30 INJECTION, SOLUTION EPIDURAL; INFILTRATION; PERINEURAL
Status: COMPLETED | OUTPATIENT
Start: 2024-05-13 | End: 2024-05-13

## 2024-05-13 RX ORDER — DEXAMETHASONE SODIUM PHOSPHATE 4 MG/ML
INJECTION, SOLUTION INTRA-ARTICULAR; INTRALESIONAL; INTRAMUSCULAR; INTRAVENOUS; SOFT TISSUE PRN
Status: DISCONTINUED | OUTPATIENT
Start: 2024-05-13 | End: 2024-05-13 | Stop reason: SDUPTHER

## 2024-05-13 RX ORDER — ACETAMINOPHEN 500 MG
1000 TABLET ORAL ONCE
Status: COMPLETED | OUTPATIENT
Start: 2024-05-13 | End: 2024-05-13

## 2024-05-13 RX ORDER — ONDANSETRON 2 MG/ML
4 INJECTION INTRAMUSCULAR; INTRAVENOUS EVERY 6 HOURS PRN
Status: DISCONTINUED | OUTPATIENT
Start: 2024-05-13 | End: 2024-05-14 | Stop reason: HOSPADM

## 2024-05-13 RX ORDER — VANCOMYCIN HYDROCHLORIDE 1 G/20ML
INJECTION, POWDER, LYOPHILIZED, FOR SOLUTION INTRAVENOUS PRN
Status: DISCONTINUED | OUTPATIENT
Start: 2024-05-13 | End: 2024-05-13 | Stop reason: ALTCHOICE

## 2024-05-13 RX ORDER — MIDAZOLAM HYDROCHLORIDE 1 MG/ML
INJECTION INTRAMUSCULAR; INTRAVENOUS PRN
Status: DISCONTINUED | OUTPATIENT
Start: 2024-05-13 | End: 2024-05-13 | Stop reason: SDUPTHER

## 2024-05-13 RX ORDER — PROPOFOL 10 MG/ML
INJECTION, EMULSION INTRAVENOUS PRN
Status: DISCONTINUED | OUTPATIENT
Start: 2024-05-13 | End: 2024-05-13 | Stop reason: SDUPTHER

## 2024-05-13 RX ORDER — SODIUM CHLORIDE 9 MG/ML
INJECTION, SOLUTION INTRAVENOUS PRN
Status: DISCONTINUED | OUTPATIENT
Start: 2024-05-13 | End: 2024-05-14 | Stop reason: HOSPADM

## 2024-05-13 RX ORDER — KETOROLAC TROMETHAMINE 30 MG/ML
15 INJECTION, SOLUTION INTRAMUSCULAR; INTRAVENOUS ONCE
Status: COMPLETED | OUTPATIENT
Start: 2024-05-13 | End: 2024-05-13

## 2024-05-13 RX ORDER — ROPIVACAINE HYDROCHLORIDE 5 MG/ML
INJECTION, SOLUTION EPIDURAL; INFILTRATION; PERINEURAL
Status: COMPLETED | OUTPATIENT
Start: 2024-05-13 | End: 2024-05-13

## 2024-05-13 RX ORDER — KETAMINE HYDROCHLORIDE 10 MG/ML
INJECTION, SOLUTION INTRAMUSCULAR; INTRAVENOUS PRN
Status: DISCONTINUED | OUTPATIENT
Start: 2024-05-13 | End: 2024-05-13 | Stop reason: SDUPTHER

## 2024-05-13 RX ORDER — SODIUM CHLORIDE 9 MG/ML
INJECTION, SOLUTION INTRAVENOUS CONTINUOUS
Status: DISCONTINUED | OUTPATIENT
Start: 2024-05-13 | End: 2024-05-14 | Stop reason: HOSPADM

## 2024-05-13 RX ORDER — ACETAMINOPHEN 325 MG/1
650 TABLET ORAL EVERY 6 HOURS
Status: DISCONTINUED | OUTPATIENT
Start: 2024-05-13 | End: 2024-05-14 | Stop reason: HOSPADM

## 2024-05-13 RX ORDER — OXYCODONE HYDROCHLORIDE 5 MG/1
5 TABLET ORAL EVERY 4 HOURS PRN
Status: DISCONTINUED | OUTPATIENT
Start: 2024-05-13 | End: 2024-05-14 | Stop reason: HOSPADM

## 2024-05-13 RX ORDER — PROPOFOL 10 MG/ML
INJECTION, EMULSION INTRAVENOUS PRN
Status: DISCONTINUED | OUTPATIENT
Start: 2024-05-13 | End: 2024-05-13

## 2024-05-13 RX ORDER — BISACODYL 5 MG/1
5 TABLET, DELAYED RELEASE ORAL DAILY PRN
Status: DISCONTINUED | OUTPATIENT
Start: 2024-05-13 | End: 2024-05-14 | Stop reason: HOSPADM

## 2024-05-13 RX ORDER — DEXAMETHASONE SODIUM PHOSPHATE 10 MG/ML
8 INJECTION, SOLUTION INTRAMUSCULAR; INTRAVENOUS ONCE
Status: DISCONTINUED | OUTPATIENT
Start: 2024-05-13 | End: 2024-05-13 | Stop reason: HOSPADM

## 2024-05-13 RX ORDER — ENOXAPARIN SODIUM 100 MG/ML
40 INJECTION SUBCUTANEOUS DAILY
Status: DISCONTINUED | OUTPATIENT
Start: 2024-05-13 | End: 2024-05-14 | Stop reason: HOSPADM

## 2024-05-13 RX ORDER — ONDANSETRON 2 MG/ML
INJECTION INTRAMUSCULAR; INTRAVENOUS PRN
Status: DISCONTINUED | OUTPATIENT
Start: 2024-05-13 | End: 2024-05-13

## 2024-05-13 RX ORDER — ONDANSETRON 2 MG/ML
INJECTION INTRAMUSCULAR; INTRAVENOUS PRN
Status: DISCONTINUED | OUTPATIENT
Start: 2024-05-13 | End: 2024-05-13 | Stop reason: SDUPTHER

## 2024-05-13 RX ORDER — SODIUM CHLORIDE 9 MG/ML
INJECTION, SOLUTION INTRAVENOUS CONTINUOUS PRN
Status: DISCONTINUED | OUTPATIENT
Start: 2024-05-13 | End: 2024-05-13 | Stop reason: SDUPTHER

## 2024-05-13 RX ORDER — TRANEXAMIC ACID 10 MG/ML
1000 INJECTION, SOLUTION INTRAVENOUS
Status: COMPLETED | OUTPATIENT
Start: 2024-05-13 | End: 2024-05-13

## 2024-05-13 RX ORDER — TRANEXAMIC ACID 10 MG/ML
1000 INJECTION, SOLUTION INTRAVENOUS ONCE
Status: COMPLETED | OUTPATIENT
Start: 2024-05-13 | End: 2024-05-14

## 2024-05-13 RX ADMIN — SODIUM CHLORIDE: 9 INJECTION, SOLUTION INTRAVENOUS at 16:02

## 2024-05-13 RX ADMIN — ACETAMINOPHEN 650 MG: 325 TABLET ORAL at 20:26

## 2024-05-13 RX ADMIN — ONDANSETRON 4 MG: 2 INJECTION INTRAMUSCULAR; INTRAVENOUS at 15:31

## 2024-05-13 RX ADMIN — HYDROMORPHONE HYDROCHLORIDE 0.25 MG: 1 INJECTION, SOLUTION INTRAMUSCULAR; INTRAVENOUS; SUBCUTANEOUS at 18:14

## 2024-05-13 RX ADMIN — PHENYLEPHRINE HYDROCHLORIDE 100 MCG: 10 INJECTION INTRAVENOUS at 15:44

## 2024-05-13 RX ADMIN — HYDROMORPHONE HYDROCHLORIDE 0.25 MG: 1 INJECTION, SOLUTION INTRAMUSCULAR; INTRAVENOUS; SUBCUTANEOUS at 18:08

## 2024-05-13 RX ADMIN — SUGAMMADEX 400 MG: 100 INJECTION, SOLUTION INTRAVENOUS at 17:40

## 2024-05-13 RX ADMIN — PHENYLEPHRINE HYDROCHLORIDE 100 MCG: 10 INJECTION INTRAVENOUS at 17:23

## 2024-05-13 RX ADMIN — ROCURONIUM BROMIDE 20 MG: 10 INJECTION, SOLUTION INTRAVENOUS at 16:53

## 2024-05-13 RX ADMIN — TRANEXAMIC ACID 1000 MG: 10 INJECTION, SOLUTION INTRAVENOUS at 18:00

## 2024-05-13 RX ADMIN — SODIUM CHLORIDE, PRESERVATIVE FREE 10 ML: 5 INJECTION INTRAVENOUS at 20:51

## 2024-05-13 RX ADMIN — SODIUM CHLORIDE: 9 INJECTION, SOLUTION INTRAVENOUS at 19:44

## 2024-05-13 RX ADMIN — DEXAMETHASONE SODIUM PHOSPHATE 10 MG: 4 INJECTION, SOLUTION INTRAMUSCULAR; INTRAVENOUS at 15:31

## 2024-05-13 RX ADMIN — PHENYLEPHRINE HYDROCHLORIDE 100 MCG: 10 INJECTION INTRAVENOUS at 16:32

## 2024-05-13 RX ADMIN — GLYCOPYRROLATE 0.3 MG: 0.2 INJECTION INTRAMUSCULAR; INTRAVENOUS at 16:26

## 2024-05-13 RX ADMIN — ROPIVACAINE HYDROCHLORIDE 30 ML: 5 INJECTION, SOLUTION EPIDURAL; INFILTRATION; PERINEURAL at 14:53

## 2024-05-13 RX ADMIN — MIDAZOLAM 2 MG: 1 INJECTION INTRAMUSCULAR; INTRAVENOUS at 15:13

## 2024-05-13 RX ADMIN — GLYCOPYRROLATE 0.3 MG: 0.2 INJECTION INTRAMUSCULAR; INTRAVENOUS at 15:40

## 2024-05-13 RX ADMIN — ACETAMINOPHEN 1000 MG: 500 TABLET ORAL at 12:58

## 2024-05-13 RX ADMIN — PHENYLEPHRINE HYDROCHLORIDE 100 MCG: 10 INJECTION INTRAVENOUS at 15:47

## 2024-05-13 RX ADMIN — KETAMINE HYDROCHLORIDE 30 MG: 10 INJECTION INTRAMUSCULAR; INTRAVENOUS at 15:27

## 2024-05-13 RX ADMIN — MIDAZOLAM 2 MG: 1 INJECTION INTRAMUSCULAR; INTRAVENOUS at 17:15

## 2024-05-13 RX ADMIN — WATER 2000 MG: 1 INJECTION INTRAMUSCULAR; INTRAVENOUS; SUBCUTANEOUS at 15:13

## 2024-05-13 RX ADMIN — SODIUM CHLORIDE: 9 INJECTION, SOLUTION INTRAVENOUS at 15:13

## 2024-05-13 RX ADMIN — ROPIVACAINE HYDROCHLORIDE 30 ML: 5 INJECTION EPIDURAL; INFILTRATION; PERINEURAL at 15:08

## 2024-05-13 RX ADMIN — ENOXAPARIN SODIUM 40 MG: 100 INJECTION SUBCUTANEOUS at 20:26

## 2024-05-13 RX ADMIN — PHENYLEPHRINE HYDROCHLORIDE 100 MCG: 10 INJECTION INTRAVENOUS at 16:26

## 2024-05-13 RX ADMIN — PHENYLEPHRINE HYDROCHLORIDE 100 MCG: 10 INJECTION INTRAVENOUS at 15:40

## 2024-05-13 RX ADMIN — PHENYLEPHRINE HYDROCHLORIDE 100 MCG: 10 INJECTION INTRAVENOUS at 16:38

## 2024-05-13 RX ADMIN — MIDAZOLAM 2 MG: 1 INJECTION INTRAMUSCULAR; INTRAVENOUS at 14:53

## 2024-05-13 RX ADMIN — PHENYLEPHRINE HYDROCHLORIDE 100 MCG: 10 INJECTION INTRAVENOUS at 16:30

## 2024-05-13 RX ADMIN — KETAMINE HYDROCHLORIDE 20 MG: 10 INJECTION INTRAMUSCULAR; INTRAVENOUS at 15:53

## 2024-05-13 RX ADMIN — PROPOFOL 200 MG: 10 INJECTION, EMULSION INTRAVENOUS at 15:27

## 2024-05-13 RX ADMIN — PHENYLEPHRINE HYDROCHLORIDE 100 MCG: 10 INJECTION INTRAVENOUS at 16:41

## 2024-05-13 RX ADMIN — KETOROLAC TROMETHAMINE 15 MG: 30 INJECTION, SOLUTION INTRAMUSCULAR at 12:58

## 2024-05-13 RX ADMIN — PHENYLEPHRINE HYDROCHLORIDE 100 MCG: 10 INJECTION INTRAVENOUS at 15:42

## 2024-05-13 RX ADMIN — ROCURONIUM BROMIDE 50 MG: 10 INJECTION, SOLUTION INTRAVENOUS at 15:27

## 2024-05-13 RX ADMIN — TRANEXAMIC ACID 1000 MG: 10 INJECTION, SOLUTION INTRAVENOUS at 15:17

## 2024-05-13 RX ADMIN — OXYCODONE 10 MG: 5 TABLET ORAL at 20:26

## 2024-05-13 RX ADMIN — PHENYLEPHRINE HYDROCHLORIDE 100 MCG: 10 INJECTION INTRAVENOUS at 17:19

## 2024-05-13 RX ADMIN — SODIUM CHLORIDE: 9 INJECTION, SOLUTION INTRAVENOUS at 16:56

## 2024-05-13 ASSESSMENT — PAIN DESCRIPTION - DESCRIPTORS
DESCRIPTORS: ACHING;SORE;DISCOMFORT
DESCRIPTORS: SORE
DESCRIPTORS: SORE
DESCRIPTORS: DISCOMFORT

## 2024-05-13 ASSESSMENT — PAIN SCALES - GENERAL
PAINLEVEL_OUTOF10: 7
PAINLEVEL_OUTOF10: 4
PAINLEVEL_OUTOF10: 10
PAINLEVEL_OUTOF10: 0
PAINLEVEL_OUTOF10: 5
PAINLEVEL_OUTOF10: 6
PAINLEVEL_OUTOF10: 5

## 2024-05-13 ASSESSMENT — PAIN DESCRIPTION - LOCATION
LOCATION: SHOULDER

## 2024-05-13 ASSESSMENT — PAIN DESCRIPTION - PAIN TYPE
TYPE: SURGICAL PAIN
TYPE: SURGICAL PAIN
TYPE: CHRONIC PAIN

## 2024-05-13 ASSESSMENT — PAIN DESCRIPTION - ORIENTATION
ORIENTATION: RIGHT

## 2024-05-13 ASSESSMENT — PAIN - FUNCTIONAL ASSESSMENT: PAIN_FUNCTIONAL_ASSESSMENT: 0-10

## 2024-05-13 ASSESSMENT — PAIN DESCRIPTION - FREQUENCY: FREQUENCY: CONTINUOUS

## 2024-05-13 NOTE — ANESTHESIA PROCEDURE NOTES
Peripheral Block    Patient location during procedure: PACU  Reason for block: post-op pain management and at surgeon's request  Start time: 5/13/2024 2:53 PM  Staffing  Performed: anesthesiologist   Anesthesiologist: Karyna Rosario MD  Performed by: Karyna Rosario MD  Authorized by: Karyna Rosario MD    Preanesthetic Checklist  Completed: patient identified, IV checked, site marked, risks and benefits discussed, surgical/procedural consents, equipment checked, pre-op evaluation, timeout performed, anesthesia consent given, oxygen available, monitors applied/VS acknowledged, fire risk safety assessment completed and verbalized and blood product R/B/A discussed and consented  Peripheral Block   Patient position: supine  Prep: ChloraPrep  Provider prep: sterile gloves and mask  Patient monitoring: cardiac monitor, continuous pulse ox, continuous capnometry, frequent blood pressure checks, IV access, oxygen and responsive to questions  Block type: Brachial plexus  Interscalene  Laterality: right  Injection technique: single-shot  Guidance: nerve stimulator and ultrasound guided    Needle   Needle type: insulated echogenic nerve stimulator needle   Needle gauge: 21 G  Needle localization: nerve stimulator and ultrasound guidance  Needle length: 2 inch.  Assessment   Injection assessment: negative aspiration for heme, no paresthesia on injection, local visualized surrounding nerve on ultrasound and no intravascular symptoms  Paresthesia pain: none  Slow fractionated injection: yes  Hemodynamics: stable  Outcomes: uncomplicated and patient tolerated procedure well    Medications Administered  ropivacaine (NAROPIN) injection 0.5% - Perineural   30 mL - 5/13/2024 2:53:00 PM

## 2024-05-13 NOTE — OP NOTE
OPERATIVE REPORT    PATIENT:  Dean Nam  [id number]    DATE OF PROCEDURE:  5/13/24    SURGEON: Taiwo Palomo MD    ASSISTANT:  Jay Ma DO, Madhav Mock DO, residents assisting     PREOPERATIVE DIAGNOSIS: Rotator cuff tear arthropathy,  Right shoulder.     POSTOPERATIVE DIAGNOSIS: Rotator cuff tear arthropathy, Right shoulder.     OPERATION:  Right reverse total shoulder arthroplasty     ANESTHESIA: . general and interscalene block    OPERATIVE INDICATIONS:  The patient is a 73 year old male with end stage arthritis of his right shoulder.  He also has very tenuous rotator cuff on MRI, and 20 degrees retroversion of his glenoid.  He has failed extensive conservative treatment.  The patient is thus indicated for shoulder arthroplasty.   Given his glenoid version and poor quality rotator cuff, it was felt reverse total shoulder would be the best option .  The risks and benefits, as well as alternatives were discussed at length with the patient in detail.  These risks include, but are not limited to infection, nerve and/or blood vessel injury, intra or post operative fracture, need for revision surgery, failure of implants, deep vein thrombosis and pulmonary embolism, shoulder stiffness, decreased motion, persistent pain, and the risks of general anesthesia provided by the anesthesiologist.   The patient understood these risks, signed an informed consent, and elected to proceed      OPERATIVE PROCEDURE: After satisfactory general anesthesia, as well as interscalene block, the patient was placed supine on the operative table. The T Max attachment was placed on the bed.  The patient was positioned so that the arm could be extended posterior.  The head was secured in the head reyes and strapped down using the face mask and secured with Coban.  The operative extremity was then prepped and draped in sterile fashion.  All skin was covered with draping and with Ioban including the axilla.  Prior to procedure

## 2024-05-13 NOTE — ANESTHESIA POSTPROCEDURE EVALUATION
Department of Anesthesiology  Postprocedure Note    Patient: Dean Nam  MRN: [id number]  YOB: 1950  Date of evaluation: 5/13/2024    Procedure Summary       Date: 05/13/24 Room / Location: [address]    Anesthesia Start: 1513 Anesthesia Stop: 1750    Procedure: RIGHT TOTAL SHOULDER REVERSE (Right) Diagnosis:       Primary osteoarthritis of right shoulder      (Primary osteoarthritis of right shoulder [M19.011])    Surgeons: Taiwo Palomo MD Responsible Provider: Karyna Rosario MD    Anesthesia Type: General ASA Status: 3            Anesthesia Type: General    Gonzalez Phase I: Gonzalez Score: 8    Gonzalez Phase II:      Anesthesia Post Evaluation    Patient location during evaluation: PACU  Patient participation: complete - patient participated  Level of consciousness: awake and alert  Pain score: 0  Airway patency: patent  Nausea & Vomiting: no vomiting and no nausea  Cardiovascular status: hemodynamically stable  Respiratory status: spontaneous ventilation, nonlabored ventilation and acceptable  Hydration status: stable  Multimodal analgesia pain management approach  Pain management: adequate and satisfactory to patient        No notable events documented.

## 2024-05-13 NOTE — H&P
consistent with treatment plan.      Taiwo Palomo MD  Orthopaedic Surgery   5/13/24  1:20 PM

## 2024-05-14 VITALS
OXYGEN SATURATION: 95 % | RESPIRATION RATE: 18 BRPM | HEIGHT: 69 IN | TEMPERATURE: 98.7 F | WEIGHT: 215 LBS | BODY MASS INDEX: 31.84 KG/M2 | DIASTOLIC BLOOD PRESSURE: 68 MMHG | HEART RATE: 74 BPM | SYSTOLIC BLOOD PRESSURE: 134 MMHG

## 2024-05-14 LAB
ANION GAP SERPL CALCULATED.3IONS-SCNC: 13 MMOL/L (ref 7–16)
BUN SERPL-MCNC: 30 MG/DL (ref 6–23)
CALCIUM SERPL-MCNC: 8.4 MG/DL (ref 8.6–10.2)
CHLORIDE SERPL-SCNC: 102 MMOL/L (ref 98–107)
CO2 SERPL-SCNC: 19 MMOL/L (ref 22–29)
CREAT SERPL-MCNC: 1.5 MG/DL (ref 0.7–1.2)
GFR, ESTIMATED: 48 ML/MIN/1.73M2
GLUCOSE SERPL-MCNC: 129 MG/DL (ref 74–99)
HCT VFR BLD AUTO: 37.5 % (ref 37–54)
MCHC RBC AUTO-ENTMCNC: 34.7 G/DL (ref 32–34.5)
MCV RBC AUTO: 90.4 FL (ref 80–99.9)
PLATELET # BLD AUTO: 251 K/UL (ref 130–450)
PMV BLD AUTO: 8.9 FL (ref 7–12)
POTASSIUM SERPL-SCNC: 4.2 MMOL/L (ref 3.5–5)
RBC # BLD AUTO: 4.15 M/UL (ref 3.8–5.8)
SODIUM SERPL-SCNC: 134 MMOL/L (ref 132–146)
WBC OTHER # BLD: 18.8 K/UL (ref 4.5–11.5)

## 2024-05-14 PROCEDURE — G0378 HOSPITAL OBSERVATION PER HR: HCPCS

## 2024-05-14 PROCEDURE — 96361 HYDRATE IV INFUSION ADD-ON: CPT

## 2024-05-14 PROCEDURE — 80048 BASIC METABOLIC PNL TOTAL CA: CPT

## 2024-05-14 PROCEDURE — 96374 THER/PROPH/DIAG INJ IV PUSH: CPT

## 2024-05-14 PROCEDURE — 36415 COLL VENOUS BLD VENIPUNCTURE: CPT

## 2024-05-14 PROCEDURE — 6370000000 HC RX 637 (ALT 250 FOR IP): Performed by: INTERNAL MEDICINE

## 2024-05-14 PROCEDURE — 97165 OT EVAL LOW COMPLEX 30 MIN: CPT

## 2024-05-14 PROCEDURE — 6370000000 HC RX 637 (ALT 250 FOR IP)

## 2024-05-14 PROCEDURE — 85027 COMPLETE CBC AUTOMATED: CPT

## 2024-05-14 PROCEDURE — 6360000002 HC RX W HCPCS

## 2024-05-14 PROCEDURE — 99222 1ST HOSP IP/OBS MODERATE 55: CPT | Performed by: INTERNAL MEDICINE

## 2024-05-14 PROCEDURE — 97161 PT EVAL LOW COMPLEX 20 MIN: CPT

## 2024-05-14 PROCEDURE — 2580000003 HC RX 258

## 2024-05-14 RX ORDER — HYDROXYZINE PAMOATE 25 MG/1
25 CAPSULE ORAL 2 TIMES DAILY PRN
Status: DISCONTINUED | OUTPATIENT
Start: 2024-05-14 | End: 2024-05-14 | Stop reason: HOSPADM

## 2024-05-14 RX ORDER — OXYCODONE HYDROCHLORIDE AND ACETAMINOPHEN 5; 325 MG/1; MG/1
1 TABLET ORAL EVERY 6 HOURS PRN
Qty: 28 TABLET | Refills: 0 | Status: SHIPPED | OUTPATIENT
Start: 2024-05-14 | End: 2024-05-21

## 2024-05-14 RX ORDER — FLUOXETINE HYDROCHLORIDE 20 MG/1
40 CAPSULE ORAL EVERY MORNING
Status: DISCONTINUED | OUTPATIENT
Start: 2024-05-14 | End: 2024-05-14 | Stop reason: HOSPADM

## 2024-05-14 RX ORDER — PANTOPRAZOLE SODIUM 40 MG/1
40 TABLET, DELAYED RELEASE ORAL
Status: DISCONTINUED | OUTPATIENT
Start: 2024-05-14 | End: 2024-05-14 | Stop reason: HOSPADM

## 2024-05-14 RX ORDER — ASPIRIN 81 MG/1
81 TABLET ORAL DAILY
Status: DISCONTINUED | OUTPATIENT
Start: 2024-05-14 | End: 2024-05-14 | Stop reason: HOSPADM

## 2024-05-14 RX ORDER — LISINOPRIL 20 MG/1
20 TABLET ORAL 2 TIMES DAILY
Status: DISCONTINUED | OUTPATIENT
Start: 2024-05-14 | End: 2024-05-14 | Stop reason: HOSPADM

## 2024-05-14 RX ORDER — BUTALBITAL, ACETAMINOPHEN AND CAFFEINE 300; 40; 50 MG/1; MG/1; MG/1
1 CAPSULE ORAL EVERY 4 HOURS PRN
Status: DISCONTINUED | OUTPATIENT
Start: 2024-05-14 | End: 2024-05-14 | Stop reason: HOSPADM

## 2024-05-14 RX ORDER — ASPIRIN 81 MG/1
81 TABLET ORAL 2 TIMES DAILY
Qty: 56 TABLET | Refills: 0 | Status: SHIPPED | OUTPATIENT
Start: 2024-05-14 | End: 2024-06-11

## 2024-05-14 RX ORDER — ATENOLOL 50 MG/1
100 TABLET ORAL EVERY MORNING
Status: DISCONTINUED | OUTPATIENT
Start: 2024-05-14 | End: 2024-05-14 | Stop reason: HOSPADM

## 2024-05-14 RX ORDER — ATORVASTATIN CALCIUM 20 MG/1
20 TABLET, FILM COATED ORAL DAILY
Status: DISCONTINUED | OUTPATIENT
Start: 2024-05-14 | End: 2024-05-14 | Stop reason: HOSPADM

## 2024-05-14 RX ORDER — AMLODIPINE BESYLATE 10 MG/1
10 TABLET ORAL EVERY MORNING
Status: DISCONTINUED | OUTPATIENT
Start: 2024-05-14 | End: 2024-05-14 | Stop reason: HOSPADM

## 2024-05-14 RX ADMIN — OXYCODONE 10 MG: 5 TABLET ORAL at 09:09

## 2024-05-14 RX ADMIN — LISINOPRIL 20 MG: 20 TABLET ORAL at 09:09

## 2024-05-14 RX ADMIN — MORPHINE SULFATE 2 MG: 2 INJECTION, SOLUTION INTRAMUSCULAR; INTRAVENOUS at 02:03

## 2024-05-14 RX ADMIN — OXYCODONE 10 MG: 5 TABLET ORAL at 00:50

## 2024-05-14 RX ADMIN — ASPIRIN 81 MG: 81 TABLET, COATED ORAL at 09:09

## 2024-05-14 RX ADMIN — WATER 2000 MG: 1 INJECTION INTRAMUSCULAR; INTRAVENOUS; SUBCUTANEOUS at 01:55

## 2024-05-14 RX ADMIN — ATORVASTATIN CALCIUM 20 MG: 20 TABLET, FILM COATED ORAL at 09:08

## 2024-05-14 RX ADMIN — AMLODIPINE BESYLATE 10 MG: 10 TABLET ORAL at 09:09

## 2024-05-14 RX ADMIN — WATER 2000 MG: 1 INJECTION INTRAMUSCULAR; INTRAVENOUS; SUBCUTANEOUS at 09:12

## 2024-05-14 RX ADMIN — ACETAMINOPHEN 650 MG: 325 TABLET ORAL at 00:53

## 2024-05-14 RX ADMIN — FLUOXETINE 40 MG: 20 CAPSULE ORAL at 09:09

## 2024-05-14 RX ADMIN — ATENOLOL 100 MG: 50 TABLET ORAL at 09:09

## 2024-05-14 RX ADMIN — ACETAMINOPHEN 650 MG: 325 TABLET ORAL at 07:02

## 2024-05-14 RX ADMIN — PANTOPRAZOLE SODIUM 40 MG: 40 TABLET, DELAYED RELEASE ORAL at 09:09

## 2024-05-14 RX ADMIN — OXYCODONE 10 MG: 5 TABLET ORAL at 04:50

## 2024-05-14 ASSESSMENT — PAIN DESCRIPTION - ORIENTATION
ORIENTATION: RIGHT

## 2024-05-14 ASSESSMENT — PAIN SCALES - GENERAL
PAINLEVEL_OUTOF10: 9
PAINLEVEL_OUTOF10: 7
PAINLEVEL_OUTOF10: 9
PAINLEVEL_OUTOF10: 9
PAINLEVEL_OUTOF10: 7

## 2024-05-14 ASSESSMENT — PAIN DESCRIPTION - LOCATION
LOCATION: SHOULDER

## 2024-05-14 ASSESSMENT — PAIN DESCRIPTION - DESCRIPTORS
DESCRIPTORS: ACHING;DISCOMFORT;SORE
DESCRIPTORS: ACHING;DISCOMFORT;SORE
DESCRIPTORS: ACHING
DESCRIPTORS: ACHING;DISCOMFORT;SORE
DESCRIPTORS: ACHING;DISCOMFORT;SORE

## 2024-05-14 ASSESSMENT — PAIN DESCRIPTION - PAIN TYPE
TYPE: SURGICAL PAIN
TYPE: SURGICAL PAIN

## 2024-05-14 NOTE — PROGRESS NOTES
ORTHOPAEDIC SURGERY  Progress Note    CC: post op R TSA    Subjective: Patient is alert and oriented x3, calm and cooperative showing no signs of acute distress.  Reports no overnight issues.     Vitals  VITALS:  BP (!) 147/77   Pulse 69   Temp 98.2 °F (36.8 °C) (Oral)   Resp 18   Ht 1.753 m (5' 9\")   Wt 97.5 kg (215 lb)   SpO2 94%   BMI 31.75 kg/m²     PHYSICAL EXAM:    Orientation:  alert and oriented to person, place and time    Right Upper Extremity    Incision:  dressing in place, clean, dry and intact    Upper Extremity Motor :  Finger/wrist flexion:  5/5  Thumb extension:  5/5  Finger abduction:  5/5    Upper Extremity Sensory: intact axillary, musculocutaneous, radial, median, ulnar distribution      Pulses:  Radial pulse palpable    Abnormal Exam findings:  none      LABS:    HgB:    Lab Results   Component Value Date/Time    HGB 13.0 05/14/2024 05:00 AM     INR:  No results found for: \"PTINR\"  CBC:   Lab Results   Component Value Date/Time    WBC 18.8 05/14/2024 05:00 AM    RBC 4.15 05/14/2024 05:00 AM    HGB 13.0 05/14/2024 05:00 AM    HCT 37.5 05/14/2024 05:00 AM    MCV 90.4 05/14/2024 05:00 AM    MCH 31.3 05/14/2024 05:00 AM    MCHC 34.7 05/14/2024 05:00 AM    RDW 12.7 05/14/2024 05:00 AM     05/14/2024 05:00 AM    MPV 8.9 05/14/2024 05:00 AM       ASSESSMENT AND PLAN:    Post operative day 1 status post right total reverse shoulder arthroplasty    - Non weight bearing operative extremity; hand/wrist ROM OK; no active or passive shoulder motion.  - Remain in Shoulder Immobilizer   - Deep venous thrombosis prophylaxis - ASA 81 BID, SCD's. French hose bilateral, thigh high  - Pain Control:  Wean to oral medications   - Dressing change: Aquacel protocol  - D/C Plan:  Anticipate DC home, he will follow up outpatient in one week.       LEROY Guzman-CNP  Orthopaedic Surgery   5/14/24  6:39 AM

## 2024-05-14 NOTE — CONSULTS
[facility]        Chief Complaint:  med management  History of Present Illness   The patient is a 73 y.o. male    Acute post op pain on chronic mechanical shoulder pain s/p  Elective admission for Rotator cuff tear arthropathy, Right shoulder.    sp Right reverse total shoulder arthroplasty    sp ANESTHESIA: . general and interscalene block    No acute N/V  Some exac of BPH complaints- low flow noted  Some chronic anxiety- lots of social issues currently present    Hx of asthma - less so since leaving \"service\", no recent exac or recent puffer use  No cpap, SABINA  Hx of HTN- bp stable  Hx of migraines currently stable  - hx taken from the patient   REVIEW OF SYSTEMS:  no fevers, chills, cp, sob    Past Medical History:      Diagnosis Date    Abdominal pain 2023    Arthritis     Asthma     Depression     GERD (gastroesophageal reflux disease)     History of colon polyps     Hyperlipidemia     Hypertension     Migraine     Osteoporosis     Right retinal detachment 04/2019    Shoulder pain, right        Past Surgical History:        Procedure Laterality Date    CATARACT REMOVAL WITH IMPLANT Right     COLONOSCOPY N/A 1/14/2020    COLONOSCOPY WITH BIOPSY performed by Faisal Desir MD at [facility] ENDOSCOPY    COLONOSCOPY N/A 6/6/2023    COLONOSCOPY WITH BIOPSY performed by Faisal Desir MD at [facility] ENDOSCOPY    FACIAL RECONSTRUCTION SURGERY      NECK SURGERY  1994    RETINAL DETACHMENT SURGERY Right 4/25/2019    RIGHT EYE RETINAL DETACHMENT REPAIR, PARS PLANA VITRECTOMY 25 GAUGE, ENDO LASER GAS FLUID EXCHANGE (SF6)++LATEX ALLERGY++ performed by Lexa Mead MD at [facility] OR    TONSILLECTOMY AND ADENOIDECTOMY      TOOTH EXTRACTION      UPPER GASTROINTESTINAL ENDOSCOPY N/A 1/14/2020    EGD BIOPSY performed by Faisal Desir MD at [facility] ENDOSCOPY    UPPER GASTROINTESTINAL ENDOSCOPY N/A 6/18/2020    EGD DILATION BALLOON performed by Faisal Desir MD at [facility] ENDOSCOPY    UPPER GASTROINTESTINAL

## 2024-05-14 NOTE — PROGRESS NOTES
CLINICAL PHARMACY NOTE: MEDS TO BEDS    Total # of Prescriptions Filled: 2   The following medications were delivered to the patient:  Percocet 5/ 325 mg  Asprin 81 mg    Additional Documentation:

## 2024-05-14 NOTE — CARE COORDINATION
Met with patient about diagnosis and discharge plan of care. POD#1 right reverse total shoulder arthoplasty. Pt lives with spouse in mobile home with 4 steps with rails. Uses no DME. PCP is Dr Alston. Plan is home today with no needs. Declining home health-o

## 2024-05-14 NOTE — ACP (ADVANCE CARE PLANNING)
Advance Care Planning   Healthcare Decision Maker:    Primary Decision Maker: Jen Nam - [facility] - [phone]    Click here to complete Healthcare Decision Makers including selection of the Healthcare Decision Maker Relationship (ie \"Primary\").

## 2024-05-14 NOTE — PROGRESS NOTES
Physical Therapy  Facility/Department: [address] MED SURG  Physical Therapy Initial Assessment    Name: Dean Nam  : 1950  MRN: [id number]  Date of Service: 2024    Attending Provider:  Taiwo Palomo MD    Evaluating PT:  Eriberto Acharya Jr., P.T.    Room #:  0742/0742-A  Diagnosis:  Primary osteoarthritis of right shoulder [M19.011]  Status post total shoulder replacement, right [Z96.611]  S/P reverse total shoulder arthroplasty, right [Z96.611]  Procedure/Surgery:  R reverse TSA 24  Precautions:  NWB RUE, R shoulder immobilizer, R hand, wrist, elbow AROM ok, No ROM R shoulder    SUBJECTIVE:    Pt lives with his wife in a mobile home with 4 stairs and 2 rails to enter. Pt ambulated with no AD PTA.    OBJECTIVE:   Initial Evaluation  Date: 24   Was pt agreeable to Eval/treatment? yes   Does pt have pain? C/o R shoulder pain 7/10   Bed Mobility  NA, pt was found and left sitting up in chair   Transfers Sit to stand: Independent  Stand to sit: Independent  Stand pivot: Independent   Ambulation   350 feet with no AD Independent    Stair negotiation: ascended and descended 4 steps with 2 rails Independent    AM-PAC 6 Clicks      Pt is A & O x4  BLE ROM is WFL.   BLE strength is grossly 4+/5.   Sensation:  Pt denies numbness and tingling to extremities  Balance: sitting is Independent and standing with no AD is Independent   Endurance: fair+    Patient education  Pt educated on NWB RUE, allowed R hand, wrist, and elbow AROM, but no R shoulder ROM.    Patient response to education:   Pt verbalized understanding Pt demonstrated skill Pt requires further education in this area   yes yes yes     ASSESSMENT:    Comments:  Pt was found sitting up in chair and was agreeable to PT.  He was Independent with functional mobility at this time and has no skilled inpatient PT needs at this time.      Pt was left sitting up in chair with call light left by patient.    Pt's/ family

## 2024-05-14 NOTE — PROGRESS NOTES
4 Eyes Skin Assessment     NAME:  Dean Nam  YOB: 1950  MEDICAL RECORD NUMBER:  [id number]    The patient is being assessed for  Admission    I agree that at least one RN has performed a thorough Head to Toe Skin Assessment on the patient. ALL assessment sites listed below have been assessed.      Areas assessed by both nurses:    Head, Face, Ears, Shoulders, Back, Chest, Arms, Elbows, Hands, Sacrum. Buttock, Coccyx, Ischium, and Legs. Feet and Heels        Does the Patient have a Wound? No noted wound(s)       Jase Prevention initiated by RN: No  Wound Care Orders initiated by RN: No    Pressure Injury (Stage 3,4, Unstageable, DTI, NWPT, and Complex wounds) if present, place Wound referral order by RN under : No    New Ostomies, if present place, Ostomy referral order under : No     Nurse 1 eSignature: Electronically signed by Amber Michele RN on 5/14/24 at 3:45 AM EDT    **SHARE this note so that the co-signing nurse can place an eSignature**    Nurse 2 eSignature: Electronically signed by Ayana Reddy RN on 5/14/24 at 3:46 AM EDT

## 2024-05-14 NOTE — PLAN OF CARE
Problem: Discharge Planning  Goal: Discharge to home or other facility with appropriate resources  5/14/2024 1313 by Jacey Newton RN  Outcome: Completed  5/14/2024 1134 by Jacey Newton RN  Outcome: Progressing  5/14/2024 0311 by Amber Michele RN  Outcome: Progressing     Problem: Pain  Goal: Verbalizes/displays adequate comfort level or baseline comfort level  5/14/2024 1313 by Jacey Newton RN  Outcome: Completed  5/14/2024 1134 by Jacey Newton RN  Outcome: Progressing  5/14/2024 0311 by Amber Michele RN  Outcome: Progressing     Problem: Safety - Adult  Goal: Free from fall injury  5/14/2024 1313 by Jacey Newton RN  Outcome: Completed  5/14/2024 1134 by Jacey Newton RN  Outcome: Progressing  5/14/2024 0311 by Amber Michele RN  Outcome: Progressing

## 2024-05-14 NOTE — PROGRESS NOTES
Occupational Therapy    OCCUPATIONAL THERAPY INITIAL EVALUATION    [facility]   [address] [place], [place]         Date:2024                                                  Patient Name: Dean Nam    MRN: [id number]    : 1950    Room: [address] [place] [address]      Evaluating OT: Angélica Mena OTR/L   [id number]      Referring Provider:Madhav Mock DO     Specific Provider Orders/Date:OT eval and treat 2024      Diagnosis:  Primary osteoarthritis of right shoulder [M19.011]  Status post total shoulder replacement, right [Z96.611]  S/P reverse total shoulder arthroplasty, right [Z96.611]   2024     Pertinent Medical History: asthma, HTN, osteoporosis,      Precautions:  Fall Risk, NWB R UE, no shoulder ROM, R hand, wrist, elbow AROM ok      Assessment of current deficits    [x] Functional mobility  [x]ADLs  [x] Strength               []Cognition    [x] Functional transfers   [x] IADLs         [x] Safety Awareness   [x]Endurance    [] Fine Coordination              [x] Balance      [] Vision/perception   []Sensation     []Gross Motor Coordination  [] ROM  [] Delirium                   [] Motor Control     OT PLAN OF CARE   OT POC based on physician orders, patient diagnosis and results of clinical assessment    Frequency/Duration  1-2 days/wk for 2 - 4weeks PRN   Specific OT Treatment Interventions to include:   ADL retraining/adapted techniques and AE recommendations to increase functional independence within precautions                    Energy conservation techniques to improve tolerance for selfcare routine   Functional transfer/mobility training/DME recommendations for increased independence, safety and fall prevention         Patient/family education to increase safety and functional independence             Environmental modifications for safe mobility and completion of ADLs                             Therapeutic activity to improve

## 2024-05-14 NOTE — ACP (ADVANCE CARE PLANNING)
Advance Care Planning   Healthcare Decision Maker:    Primary Decision Maker: Jen Nam - [facility] - [phone]    Click here to complete Healthcare Decision Makers including selection of the Healthcare Decision Maker Relationship (ie \"Primary\").

## 2024-05-14 NOTE — FLOWSHEET NOTE
05/14/24 1314   AVS Reviewed   AVS & discharge instructions reviewed with patient and/or representative? Yes   Reviewed instructions with Patient   Level of Understanding Verbalized understanding     Discharge instructions given to patient. Medication administration, follow up appointments and special instructions discussed. Patient thankful for the care received.

## 2024-05-16 LAB — SURGICAL PATHOLOGY REPORT: NORMAL

## 2024-05-16 NOTE — DISCHARGE SUMMARY
Physician Discharge Summary     Patient ID:  Dean Nam  [id number]  73 y.o.  1950    Admit date: 5/13/2024    Discharge date and time: 5/14/2024 12:31 PM     Admitting Physician: Taiwo Palomo MD     Discharge Physician: Taiwo Palomo MD    Admission Diagnoses: Primary osteoarthritis of right shoulder [M19.011]  Status post total shoulder replacement, right [Z96.611]  S/P reverse total shoulder arthroplasty, right [Z96.611]    Discharge Diagnoses: Primary osteoarthritis of right shoulder [M19.011]  Status post total shoulder replacement, right [Z96.611]  S/P reverse total shoulder arthroplasty, right [Z96.611]    Admission Condition: good    Discharged Condition: good    Surgery: Right reverse total shoulder arthroplasty    Hospital Course: The patient was admitted for elective joint replacement.  The patient underwent routine right reverse total shoulder arthroplasty with anesthesia and was transferred to the orthopaedic floor from PACU following surgery.  The post operative hospital course was unremarkable.  The patient worked with PT/OT daily to improve mobility.  Pain was controlled and DVT prophylaxis was with SCD's and ASA 81 BID.  The patient was discharged on POD 1 to home.      Consults: PCP, PT/OT, Case management     Significant Diagnostic Studies:   Post operative xray showed components in good position     Treatments:   IV hydration  antibiotics: Ancef perioperatively for 24 hours  analgesia: oral and IV narcotics; toradol  anticoagulation: ASA 81 BID  therapies: PT, OT and    surgery: as above     Discharge Exam:  Operative limb incision was clean, dry, and intact.  Leg swelling was minimal.  Motor and sensory were intact throughout the operative leg.      Disposition: home    Patient Instructions:   Discharge Medication List as of 5/14/2024 12:12 PM        START taking these medications    Details   aspirin 81 MG EC tablet Take 1 tablet by mouth 2 times daily for 28 days, Disp-56

## 2024-05-24 ENCOUNTER — OFFICE VISIT (OUTPATIENT)
Dept: ORTHOPEDIC SURGERY | Age: 74
End: 2024-05-24

## 2024-05-24 VITALS — HEIGHT: 69 IN | BODY MASS INDEX: 31.84 KG/M2 | WEIGHT: 215 LBS

## 2024-05-24 DIAGNOSIS — Z96.611 STATUS POST TOTAL SHOULDER ARTHROPLASTY, RIGHT: Primary | ICD-10-CM

## 2024-05-24 DIAGNOSIS — Z96.611 S/P REVERSE TOTAL SHOULDER ARTHROPLASTY, RIGHT: ICD-10-CM

## 2024-05-24 PROCEDURE — 99024 POSTOP FOLLOW-UP VISIT: CPT | Performed by: ORTHOPAEDIC SURGERY

## 2024-05-24 RX ORDER — OXYCODONE HYDROCHLORIDE AND ACETAMINOPHEN 5; 325 MG/1; MG/1
1 TABLET ORAL EVERY 6 HOURS PRN
Qty: 28 TABLET | Refills: 0 | Status: SHIPPED | OUTPATIENT
Start: 2024-05-24 | End: 2024-05-31

## 2024-05-24 NOTE — PROGRESS NOTES
[facility]   ORTHOPAEDIC SURGERY AND SPORTS MEDICINE  DATE OF VISIT: 05/24/24  Follow Up Post Operative Visit     CHIEF COMPLAINT:   Chief Complaint   Patient presents with    Post-Op Check     Pt is here POD # 11 for right reverse total shoulder arthroplasty. Overall doing well.        Surgery: Right reverse total shoulder arthroplasty   Date: 05/13/2024    Subjective:    Dean Nam is here for follow up visit s/p above procedure.  He is doing well.  He has been compliant with postoperative restrictions.    Controlled Substances Monitoring:        Physical Exam:    Height: 1.753 m (5' 9\"), Weight - Scale: 97.5 kg (215 lb)    General: Alert and oriented x3, no acute distress  Cardiovascular/pulmonary: No labored breathing, peripheral perfusion intact  Musculoskeletal:    Right shoulder exam incision is closed edges are well-approximated no active drainage present.  New Steri-Strip dressings are in place.  Stable postoperative swelling.  No reported pain to the hand wrist or elbow.      Imaging: X-ray including 2 views right shoulder display stable postoperative changes and good alignment of reverse total shoulder arthroplasty without evidence of complication.    Assessment and Plan: Status post right reverse total shoulder arthroplasty    Patient is postop week 1 from procedure listed above.  Overall he is doing very well.  Postop imaging obtained and reviewed with patient today.  We reviewed the postoperative protocol and restrictions.  Patient was instructed to not submerge incisions until mature scars present.  He can shower for basic hygiene purposes.  He will follow-up in 6 weeks for new imaging and reevaluation.      SHYLA Guzman  Orthopedic Surgery   05/24/24  9:02 AM    Staff Addendum    I have seen and evaluated the patient and agree with the assessment and plan as documented by Kareem Ascencio CNP. I have performed the key components of the history and physical examination and concur with

## 2024-06-03 ENCOUNTER — TELEPHONE (OUTPATIENT)
Dept: ORTHOPEDIC SURGERY | Age: 74
End: 2024-06-03

## 2024-06-03 LAB
ALBUMIN: 3.9 G/DL
ALP BLD-CCNC: 102 U/L
ALT SERPL-CCNC: 28 U/L
ANION GAP SERPL CALCULATED.3IONS-SCNC: NORMAL MMOL/L
AST SERPL-CCNC: 25 U/L
BASOPHILS ABSOLUTE: 0.1 /ΜL
BASOPHILS RELATIVE PERCENT: 0.9 %
BILIRUB SERPL-MCNC: 0.6 MG/DL (ref 0.1–1.4)
BUN BLDV-MCNC: 18 MG/DL
CALCIUM SERPL-MCNC: 9.4 MG/DL
CHLORIDE BLD-SCNC: 109 MMOL/L
CHOLESTEROL, TOTAL: 157 MG/DL
CHOLESTEROL/HDL RATIO: NORMAL
CO2: 24 MMOL/L
CREAT SERPL-MCNC: 1 MG/DL
EOSINOPHILS ABSOLUTE: 0.3 /ΜL
EOSINOPHILS RELATIVE PERCENT: 3.3 %
GFR, ESTIMATED: 80
GLUCOSE BLD-MCNC: 93 MG/DL
HCT VFR BLD CALC: 40.9 % (ref 41–53)
HDLC SERPL-MCNC: 39 MG/DL (ref 35–70)
HEMOGLOBIN: 13.8 G/DL (ref 13.5–17.5)
LDL CHOLESTEROL: 99
LYMPHOCYTES ABSOLUTE: 1.8 /ΜL
LYMPHOCYTES RELATIVE PERCENT: 22.4 %
MCH RBC QN AUTO: 31.3 PG
MCHC RBC AUTO-ENTMCNC: 33.7 G/DL
MCV RBC AUTO: 92.8 FL
MONOCYTES ABSOLUTE: 0.7 /ΜL
MONOCYTES RELATIVE PERCENT: 8.5 %
NEUTROPHILS ABSOLUTE: 5.2 /ΜL
NEUTROPHILS RELATIVE PERCENT: 64.9 %
NONHDLC SERPL-MCNC: NORMAL MG/DL
PLATELET # BLD: 286 K/ΜL
PMV BLD AUTO: 7.3 FL
POTASSIUM SERPL-SCNC: 4.3 MMOL/L
RBC # BLD: 4.41 10^6/ΜL
SODIUM BLD-SCNC: 140 MMOL/L
TOTAL PROTEIN: 7 G/DL (ref 6.4–8.2)
TRIGL SERPL-MCNC: 84 MG/DL
VLDLC SERPL CALC-MCNC: NORMAL MG/DL
WBC # BLD: 8 10^3/ML

## 2024-06-03 NOTE — TELEPHONE ENCOUNTER
Patients wife called, states noticed some swelling, warmth and redness inner part of the upper arm / biceps.    Surgery: Right reverse total shoulder arthroplasty   Date: 05/13/2024    Patient is 3 weeks out

## 2024-06-05 NOTE — TELEPHONE ENCOUNTER
Spoke to Patient wife on Monday, made aware - monitor , nothing to be concerned about at this time. Mild swelling and discomfort is expected

## 2024-07-01 ENCOUNTER — OFFICE VISIT (OUTPATIENT)
Dept: FAMILY MEDICINE CLINIC | Age: 74
End: 2024-07-01
Payer: MEDICARE

## 2024-07-01 VITALS
TEMPERATURE: 98.3 F | RESPIRATION RATE: 18 BRPM | HEIGHT: 69 IN | OXYGEN SATURATION: 96 % | BODY MASS INDEX: 31.84 KG/M2 | HEART RATE: 82 BPM | WEIGHT: 215 LBS | DIASTOLIC BLOOD PRESSURE: 78 MMHG | SYSTOLIC BLOOD PRESSURE: 126 MMHG

## 2024-07-01 DIAGNOSIS — M79.89 BILATERAL SWELLING OF FEET: ICD-10-CM

## 2024-07-01 DIAGNOSIS — M79.89 SWELLING OF RIGHT UPPER EXTREMITY: Primary | ICD-10-CM

## 2024-07-01 PROCEDURE — 99213 OFFICE O/P EST LOW 20 MIN: CPT

## 2024-07-01 PROCEDURE — 3078F DIAST BP <80 MM HG: CPT

## 2024-07-01 PROCEDURE — 3074F SYST BP LT 130 MM HG: CPT

## 2024-07-01 PROCEDURE — 1123F ACP DISCUSS/DSCN MKR DOCD: CPT

## 2024-07-01 ASSESSMENT — ENCOUNTER SYMPTOMS
VOMITING: 0
SHORTNESS OF BREATH: 0
CONSTIPATION: 0
APNEA: 0
SORE THROAT: 0
COUGH: 0
ABDOMINAL PAIN: 0
DIARRHEA: 0

## 2024-07-01 NOTE — PROGRESS NOTES
Chief Complaint:   Other      History of Present Illness   HPI:  Dean Nam is a 74 y.o. male who presents to walk-in today for concerns of right arm swelling.  He did have a shoulder surgery a few months ago.  He follows with Dr. Palomo for this.  However, he is on vacation.  Patient is just concerned and wants to ensure that everything is okay.  He has not started physical therapy yet however it is going to be soon after he sees Dr. Palomo on July 10.  [facility] had [facility] and is concerned due to him having slight swelling in his feet.  He denies any shortness of breath, chest pain, fever, chills, nausea, vomiting, or diarrhea.    Prior to Visit Medications    Medication Sig Taking? Authorizing Provider   Multiple Vitamins-Minerals (MULTIVITAMIN ADULTS PO) Take by mouth daily Yes Jassi Babcock MD   omeprazole (PRILOSEC) 40 MG delayed release capsule Take 1 capsule by mouth every morning (before breakfast) Yes Taiwo Alston MD   cycloSPORINE (RESTASIS) 0.05 % ophthalmic emulsion USE ONE DROP INTO EACH EYE TWICE DAILY Yes Jassi Babcock MD   hydrOXYzine HCl (ATARAX) 25 MG tablet Take 1 tablet by mouth 2 times daily as needed for Itching  Patient taking differently: Take 1 tablet by mouth 2 times daily Yes Taiwo Alston MD   FLUoxetine (PROZAC) 40 MG capsule Take 1 capsule by mouth every morning Yes Taiwo Alston MD   atorvastatin (LIPITOR) 20 MG tablet Take 1 tablet by mouth daily Yes Taiwo Alston MD   amLODIPine (NORVASC) 10 MG tablet Take 1 tablet by mouth every morning Yes Taiwo Alston MD   butalbital-acetaminophen-caffeine (FIORICET, ESGIC) -40 MG per tablet Take 1 tablet by mouth every 4 hours as needed for Headaches Yes Jassi Babcock MD   aspirin 81 MG tablet Take 1 tablet by mouth daily Yes Jassi Babcock MD   albuterol sulfate HFA (PROVENTIL;VENTOLIN;PROAIR) 108 (90 Base) MCG/ACT inhaler Inhale

## 2024-07-08 ENCOUNTER — OFFICE VISIT (OUTPATIENT)
Dept: FAMILY MEDICINE CLINIC | Age: 74
End: 2024-07-08
Payer: MEDICARE

## 2024-07-08 VITALS
DIASTOLIC BLOOD PRESSURE: 76 MMHG | HEART RATE: 76 BPM | TEMPERATURE: 97.5 F | RESPIRATION RATE: 18 BRPM | OXYGEN SATURATION: 96 % | WEIGHT: 215 LBS | HEIGHT: 69 IN | BODY MASS INDEX: 31.84 KG/M2 | SYSTOLIC BLOOD PRESSURE: 122 MMHG

## 2024-07-08 DIAGNOSIS — L03.116 BILATERAL CELLULITIS OF LOWER LEG: Primary | ICD-10-CM

## 2024-07-08 DIAGNOSIS — L03.115 BILATERAL CELLULITIS OF LOWER LEG: Primary | ICD-10-CM

## 2024-07-08 PROCEDURE — 1123F ACP DISCUSS/DSCN MKR DOCD: CPT

## 2024-07-08 PROCEDURE — 3078F DIAST BP <80 MM HG: CPT

## 2024-07-08 PROCEDURE — 99213 OFFICE O/P EST LOW 20 MIN: CPT

## 2024-07-08 PROCEDURE — 3074F SYST BP LT 130 MM HG: CPT

## 2024-07-08 RX ORDER — CEPHALEXIN 500 MG/1
500 CAPSULE ORAL 4 TIMES DAILY
Qty: 28 CAPSULE | Refills: 0 | Status: SHIPPED | OUTPATIENT
Start: 2024-07-08 | End: 2024-07-15

## 2024-07-08 ASSESSMENT — ENCOUNTER SYMPTOMS
DIARRHEA: 0
CONSTIPATION: 0
NAUSEA: 0
APNEA: 0
SHORTNESS OF BREATH: 0
ABDOMINAL PAIN: 0
VOMITING: 0
COUGH: 0

## 2024-07-08 NOTE — PROGRESS NOTES
family history is not on file.  Allergies: Fentanyl    Physical Exam   Vital Signs:  /76   Pulse 76   Temp 97.5 °F (36.4 °C) (Temporal)   Resp 18   Ht 1.753 m (5' 9\")   Wt 97.5 kg (215 lb)   SpO2 96%   BMI 31.75 kg/m²    Oxygen Saturation Interpretation: Normal.    Physical Exam  Constitutional:       Appearance: Normal appearance.   HENT:      Right Ear: Tympanic membrane and ear canal normal.      Left Ear: Tympanic membrane and ear canal normal.      Mouth/Throat:      Mouth: Mucous membranes are moist.      Pharynx: Oropharynx is clear.   Eyes:      Conjunctiva/sclera: Conjunctivae normal.      Pupils: Pupils are equal, round, and reactive to light.   Cardiovascular:      Rate and Rhythm: Normal rate and regular rhythm.      Pulses: Normal pulses.      Heart sounds: Normal heart sounds.      Comments: Erythema warmth noted to the bilateral feet. Radiating to the ankles and now the leg. Worse on the left than right.   Pulmonary:      Effort: Pulmonary effort is normal.      Breath sounds: Normal breath sounds.   Abdominal:      General: Abdomen is flat. Bowel sounds are normal. There is no distension.      Palpations: Abdomen is soft.      Tenderness: There is no abdominal tenderness.   Musculoskeletal:      Right lower leg: Edema present.      Left lower leg: Edema present.   Skin:     General: Skin is warm and dry.      Capillary Refill: Capillary refill takes less than 2 seconds.   Neurological:      Mental Status: He is alert.       Test Results Section   (All laboratory and radiology results have been personally reviewed by myself)  Labs:  No results found for this visit on 07/08/24.     Imaging:  All Radiology results interpreted by Radiologist unless otherwise noted.  No results found.      Assessment / Plan   Impression(s):  Dean was seen today for foot swelling and rash.    Diagnoses and all orders for this visit:    Bilateral cellulitis of lower leg  -     cephALEXin (KEFLEX) 500 MG

## 2024-07-10 ENCOUNTER — OFFICE VISIT (OUTPATIENT)
Dept: ORTHOPEDIC SURGERY | Age: 74
End: 2024-07-10

## 2024-07-10 VITALS — WEIGHT: 215 LBS | BODY MASS INDEX: 31.84 KG/M2 | HEIGHT: 69 IN

## 2024-07-10 DIAGNOSIS — M25.512 ACUTE PAIN OF LEFT SHOULDER: ICD-10-CM

## 2024-07-10 DIAGNOSIS — Z96.611 STATUS POST TOTAL SHOULDER ARTHROPLASTY, RIGHT: Primary | ICD-10-CM

## 2024-07-10 DIAGNOSIS — M19.011 PRIMARY OSTEOARTHRITIS OF RIGHT SHOULDER: ICD-10-CM

## 2024-07-10 RX ORDER — BUPIVACAINE HYDROCHLORIDE 2.5 MG/ML
2 INJECTION, SOLUTION INFILTRATION; PERINEURAL ONCE
Status: COMPLETED | OUTPATIENT
Start: 2024-07-10 | End: 2024-07-11

## 2024-07-10 RX ORDER — TRIAMCINOLONE ACETONIDE 40 MG/ML
40 INJECTION, SUSPENSION INTRA-ARTICULAR; INTRAMUSCULAR ONCE
Status: COMPLETED | OUTPATIENT
Start: 2024-07-10 | End: 2024-07-11

## 2024-07-10 NOTE — PROGRESS NOTES
[facility]   ORTHOPAEDIC SURGERY AND SPORTS MEDICINE  DATE OF VISIT: 07/10/24  Follow Up Post Operative Visit     CHIEF COMPLAINT:   Chief Complaint   Patient presents with    Follow Up After Procedure     Surgery: Right reverse total shoulder arthroplasty   Date: 05/13/2024      Post-Op Check     Right shoulder s/p 6 weeks    Pain     Right shoulder pain off and on, 7 / 10 at worse - present out of sling        Surgery: Right reverse total shoulder arthroplasty   Date: 05/13/2024    Subjective:    Dean Nam is here for follow up visit s/p above procedure.  He is doing well.  He has been making good progress with his recovery.  He has been compliant    Controlled Substances Monitoring:        Physical Exam:    Height: 1.753 m (5' 9\"), Weight - Scale: 97.5 kg (215 lb)    General: Alert and oriented x3, no acute distress  Cardiovascular/pulmonary: No labored breathing, peripheral perfusion intact  Musculoskeletal:    Exam of the shoulder shows intact incision.  No swelling noted over the incision.  There is some mild swelling in the distal upper arm which is improving.  He has intact motor and sensory function throughout the extremity.  He can elevate about 100 degrees actively, passive I can increase him to about 120 with mild stiffness      Imaging: Exam of the shoulder shows well aligned reverse total shoulder replacement    Assessment and Plan: 6 weeks out from right reverse total shoulder arthroplasty  He is doing very well.  He will begin PT.  Discontinue sling.  We discussed precautions.  He can continue active range of motion as well as passive stretching.  He will avoid any heavy lifting.  Follow-up in 6 weeks.  No images needed at that visit    Taiwo Palomo MD  Orthopaedic Surgery   7/10/24  9:52 AM

## 2024-07-11 RX ADMIN — TRIAMCINOLONE ACETONIDE 40 MG: 40 INJECTION, SUSPENSION INTRA-ARTICULAR; INTRAMUSCULAR at 09:03

## 2024-07-11 RX ADMIN — BUPIVACAINE HYDROCHLORIDE 5 MG: 2.5 INJECTION, SOLUTION INFILTRATION; PERINEURAL at 09:00

## 2024-07-18 ENCOUNTER — EVALUATION (OUTPATIENT)
Dept: PHYSICAL THERAPY | Age: 74
End: 2024-07-18
Payer: MEDICARE

## 2024-07-18 DIAGNOSIS — Z96.611 STATUS POST TOTAL SHOULDER ARTHROPLASTY, RIGHT: Primary | ICD-10-CM

## 2024-07-18 PROCEDURE — 97161 PT EVAL LOW COMPLEX 20 MIN: CPT | Performed by: PHYSICAL THERAPIST

## 2024-07-18 NOTE — PROGRESS NOTES
[facility]   Phone: [phone]   Fax: [phone]      Physical Therapy Daily Treatment Note    Date: 2024  Patient Name: Dean Nam  : 1950   MRN: [id number]  DOInjury: years  DOSx:  2024   Surgery: Right Reverse Total Shoulder Arthroplasty   Referring Provider: Taiwo Palomo MD  [address] [facility]  [address] [place] [address]     Medical Diagnosis:     Z96.611 (ICD-10-CM) - Status post total shoulder arthroplasty, right     Outcome Measure:  Quickdash 79.55%    S: See eval.  Pt is 9 weeks postop.   O:   Time 4744-0137      Visit   Repeat outcome measure at mid point and end.    Pain 4-7 /10     ROM Joint/Motion:  Right Shoulder:  AROM: 85° Forward elevation,  15° ER,  IR to 50,   (er/ir test at approx 30* abd)    abd 75   PROM: 85° Forward elevation,  10° ER,  50° IR,(er/ir test at approx 30* abd)    abd 80      Left Shoulder:  AROM: 100° Forward elevation,  40° ER,  IR to 35, abd 125     Modalities            Manual                  Stretch      Table slides      Wall Flexion      Wall ER stretch      Towel IR stretch      IR reaching behind back      Exercise      Shrugs AROM      Pendulum Ex      UBE      Pulleys - flex      Pulleys-IR      Supine wand chest press      Supine wand flex      Supine wand ER/IR      Supine flexion      S-lying ABD      S-lying ER      Standing wand flex      Standing flexion      Standing ABD            ROWS: H Functional activities  To aid in ROM and strength needed for reaching , lifting ,pushing and pulling at home/work    ROWS: M  \"    ROWS: L  \"    ER  \"    IR  \"                A:  Tolerated well.  Reviewed precautions with pt.  Pt's family present for evaluation.   P: Continue with rehab plan  Abigail Navarro, PT DPT, PT [id number]    Treatment Charges: Mins Units   Initial Evaluation 26 1   Re-Evaluation     Ther Exercise         TE     Manual Therapy     MT     Ther Activities        TA     Gait Training 
   atorvastatin (LIPITOR) 20 MG tablet Take 1 tablet by mouth daily 90 tablet 3    amLODIPine (NORVASC) 10 MG tablet Take 1 tablet by mouth every morning 90 tablet 3    butalbital-acetaminophen-caffeine (FIORICET, ESGIC) -40 MG per tablet Take 1 tablet by mouth every 4 hours as needed for Headaches      aspirin 81 MG tablet Take 1 tablet by mouth daily      albuterol sulfate HFA (PROVENTIL;VENTOLIN;PROAIR) 108 (90 Base) MCG/ACT inhaler Inhale into the lungs every 4 hours as needed Instructed to take am of procedure if needed and bring dos      oxyCODONE-acetaminophen (PERCOCET) 5-325 MG per tablet Take 1 tablet by mouth 2 times daily. Instructed to take am of procedure      lisinopril (PRINIVIL;ZESTRIL) 20 MG tablet Take 1 tablet by mouth 2 times daily      atenolol (TENORMIN) 100 MG tablet Take 1 tablet by mouth every morning       No current facility-administered medications for this visit.       Occupation: does not work.      Patient Goals: return to prior activity    Precautions/Contraindications: recent surgery, protocol     OBJECTIVE:     Observations: well nourished male      Incision: edges approximated, no purulent drainage, no redness, no swelling, no tenderness, and not hot to touch.                   Palpation: min Tender to palpation anterior shoulder, Non-tender to palpation superior or posterior shoulder      Joint/Motion:  Right Shoulder:  AROM: 85° Forward elevation,  15° ER,  IR to 50,   (er/ir test at approx 30* abd)    abd 75   PROM: 85° Forward elevation,  10° ER,  50° IR,(er/ir test at approx 30* abd)    abd 80     Left Shoulder:  AROM: 100° Forward elevation,  40° ER,  IR to 35, abd 125      Strength:  Right Shoulder:  NT    Left Shoulder: Flexion 4/5,  Abduction 4/5, ER 4/5, IR 4/5       Special Tests/Functional Screens:  NT due to postop  [] Betty-Tarun []+ / [] -  [] Pawnee's []+ / [] -   [] AC Sheer []+ / [] -    [] GH drawer []+ / [] -    [] Bicep Load []+ / [] -   [] Crank

## 2024-07-23 ENCOUNTER — TREATMENT (OUTPATIENT)
Dept: PHYSICAL THERAPY | Age: 74
End: 2024-07-23
Payer: MEDICARE

## 2024-07-23 DIAGNOSIS — Z96.611 STATUS POST TOTAL SHOULDER ARTHROPLASTY, RIGHT: Primary | ICD-10-CM

## 2024-07-23 PROCEDURE — 97110 THERAPEUTIC EXERCISES: CPT

## 2024-07-23 PROCEDURE — 97140 MANUAL THERAPY 1/> REGIONS: CPT

## 2024-07-23 NOTE — PROGRESS NOTES
A:  Tolerated well.  Pt to continue table slides, pulleys and pendulums previously given my HHPT. Difficulty relaxing for PROM. Wife was present at the end of session.     P: Continue with rehab plan  SANTOS HAMILTON, PTA [id number]  Treatment Charges: Mins Units   Initial Evaluation     Re-Evaluation     Ther Exercise         TE 23 1   Manual Therapy     MT 8 1   Ther Activities        TA     Gait Training          GT     Neuro Re-education NR     Modalities     Non-Billable Service Time 10    Other     Total Time/Units 41 2

## 2024-07-30 ENCOUNTER — TREATMENT (OUTPATIENT)
Dept: PHYSICAL THERAPY | Age: 74
End: 2024-07-30
Payer: MEDICARE

## 2024-07-30 DIAGNOSIS — Z96.611 STATUS POST TOTAL SHOULDER ARTHROPLASTY, RIGHT: Primary | ICD-10-CM

## 2024-07-30 PROCEDURE — 97110 THERAPEUTIC EXERCISES: CPT | Performed by: PHYSICAL THERAPIST

## 2024-07-30 PROCEDURE — 97140 MANUAL THERAPY 1/> REGIONS: CPT | Performed by: PHYSICAL THERAPIST

## 2024-07-30 NOTE — PROGRESS NOTES
[facility]   Phone: [phone]   Fax: [phone]      Physical Therapy Daily Treatment Note    Date: 2024  Patient Name: Dean Nam  : 1950   MRN: [id number]  DOInjury: years  DOSx:  2024   Surgery: Right Reverse Total Shoulder Arthroplasty   Referring Provider:  Taiwo Palomo MD  [address] [place] [address]           Medical Diagnosis:     Z96.611 (ICD-10-CM) - Status post total shoulder arthroplasty, right     Outcome Measure:  Quickdash 79.55%    S:   Pt is 11 weeks postop.  Pt reports 5/10 pain.  Reports arm feels heavy and tight.    O:   Time 1040 - 1120     Visit 3/12  Repeat outcome measure at mid point and end.    Pain See above      ROM Joint/Motion:  Right Shoulder:  AROM: 85° Forward elevation,  15° ER,  IR to 50,   (er/ir test at approx 30* abd)    abd 75   PROM: 85° Forward elevation,  10° ER,  50° IR,(er/ir test at approx 30* abd)    abd 80      Left Shoulder:  AROM: 100° Forward elevation,  40° ER,  IR to 35, abd 125     Modalities      ICE 10 min     Manual      PROM within protocol guidelines  8 min           Stretch      Table slides X 10 each  Flex and scaption    Wall Flexion 10x 5s holds      Wall ER stretch      Towel IR stretch      IR reaching behind back      Exercise      Shrugs AROM 2 x 10      Pendulum Ex      UBE      Scap retraction  X 20      Pulleys - flex X 20      Pulleys-IR      SB roll out  X 10 with 5 sec hold      Supine wand chest press 2 x 10      Supine wand flex 2 x 10      Supine wand ER/IR      Supine flexion      S-lying ABD      S-lying ER X 15      Standing wand flex      Standing flexion      Standing ABD      Elbow flex/extension X 20     Radial and ulnar deviation      Wrist pronation/supination X 20     Wrist flex/extension X 20     Bent rows to neutral  2 x 10      ROWS: H Functional activities  To aid in ROM and strength needed for reaching , lifting ,pushing and pulling at

## 2024-08-01 ENCOUNTER — TREATMENT (OUTPATIENT)
Dept: PHYSICAL THERAPY | Age: 74
End: 2024-08-01
Payer: MEDICARE

## 2024-08-01 DIAGNOSIS — Z96.611 STATUS POST TOTAL SHOULDER ARTHROPLASTY, RIGHT: Primary | ICD-10-CM

## 2024-08-01 PROCEDURE — 97110 THERAPEUTIC EXERCISES: CPT | Performed by: PHYSICAL THERAPIST

## 2024-08-01 PROCEDURE — 97140 MANUAL THERAPY 1/> REGIONS: CPT | Performed by: PHYSICAL THERAPIST

## 2024-08-01 NOTE — PROGRESS NOTES
[facility]   Phone: [phone]   Fax: [phone]      Physical Therapy Daily Treatment Note    Date: 2024  Patient Name: Dean Nam  : 1950   MRN: [id number]  DOInjury: years  DOSx:  2024   Surgery: Right Reverse Total Shoulder Arthroplasty   Referring Provider:  Taiwo Palomo MD  [address] [facility]  [address] [place] [address]           Medical Diagnosis:     Z96.611 (ICD-10-CM) - Status post total shoulder arthroplasty, right     Outcome Measure:  Quickdash 79.55%    S:   Pt is 11 weeks postop.  Reports sore today.   O:   Time 6164 - 8184     Visit   Repeat outcome measure at mid point and end.    Pain See above      ROM Joint/Motion:  Right Shoulder:  AROM: 85° Forward elevation,  15° ER,  IR to 50,   (er/ir test at approx 30* abd)    abd 75   PROM: 85° Forward elevation,  10° ER,  50° IR,(er/ir test at approx 30* abd)    abd 80      Left Shoulder:  AROM: 100° Forward elevation,  40° ER,  IR to 35, abd 125     Modalities      ICE 10 min     Manual      PROM within protocol guidelines  8 min           Stretch      Table slides X 10 each  Flex and scaption    Wall Flexion 10x 5s holds      Wall ER stretch      Towel IR stretch      IR reaching behind back      Exercise      Shrugs AROM 2 x 10      Pendulum Ex      UBE      Scap retraction  X 20      Pulleys - flex X 20      Pulleys-IR      SB roll out  X 10 with 5 sec hold      Supine wand chest press 2 x 10      Supine wand flex 2 x 10      Supine wand ER/IR      Supine flexion      S-lying ABD      S-lying ER 2X 10       Standing wand flex      Standing flexion      Standing ABD      Elbow flex/extension X 20     Radial and ulnar deviation      Wrist pronation/supination X 20     Wrist flex/extension X 20     Bent rows to neutral  2 x 10      ROWS: H Functional activities  To aid in ROM and strength needed for reaching , lifting ,pushing and pulling at home/work    ROWS: M  \"    ROWS: L  \"

## 2024-08-06 ENCOUNTER — TREATMENT (OUTPATIENT)
Dept: PHYSICAL THERAPY | Age: 74
End: 2024-08-06
Payer: MEDICARE

## 2024-08-06 ENCOUNTER — OFFICE VISIT (OUTPATIENT)
Dept: FAMILY MEDICINE CLINIC | Age: 74
End: 2024-08-06
Payer: MEDICARE

## 2024-08-06 VITALS
OXYGEN SATURATION: 98 % | TEMPERATURE: 98.1 F | HEIGHT: 69 IN | BODY MASS INDEX: 32.14 KG/M2 | SYSTOLIC BLOOD PRESSURE: 120 MMHG | DIASTOLIC BLOOD PRESSURE: 78 MMHG | WEIGHT: 217 LBS | RESPIRATION RATE: 18 BRPM | HEART RATE: 67 BPM

## 2024-08-06 DIAGNOSIS — I10 ESSENTIAL HYPERTENSION: Primary | ICD-10-CM

## 2024-08-06 DIAGNOSIS — E78.2 MIXED HYPERLIPIDEMIA: ICD-10-CM

## 2024-08-06 DIAGNOSIS — G89.29 CHRONIC NONINTRACTABLE HEADACHE, UNSPECIFIED HEADACHE TYPE: ICD-10-CM

## 2024-08-06 DIAGNOSIS — N18.2 STAGE 2 CHRONIC KIDNEY DISEASE: ICD-10-CM

## 2024-08-06 DIAGNOSIS — K31.5 DUODENAL STRICTURE: ICD-10-CM

## 2024-08-06 DIAGNOSIS — M81.8 OTHER OSTEOPOROSIS WITHOUT CURRENT PATHOLOGICAL FRACTURE: ICD-10-CM

## 2024-08-06 DIAGNOSIS — R51.9 CHRONIC NONINTRACTABLE HEADACHE, UNSPECIFIED HEADACHE TYPE: ICD-10-CM

## 2024-08-06 DIAGNOSIS — G89.29 CHRONIC BILATERAL LOW BACK PAIN WITHOUT SCIATICA: ICD-10-CM

## 2024-08-06 DIAGNOSIS — G89.4 CHRONIC PAIN DISORDER: ICD-10-CM

## 2024-08-06 DIAGNOSIS — F34.1 DYSTHYMIA: ICD-10-CM

## 2024-08-06 DIAGNOSIS — Z96.611 STATUS POST TOTAL SHOULDER ARTHROPLASTY, RIGHT: Primary | ICD-10-CM

## 2024-08-06 DIAGNOSIS — M54.50 CHRONIC BILATERAL LOW BACK PAIN WITHOUT SCIATICA: ICD-10-CM

## 2024-08-06 DIAGNOSIS — K29.70 GASTRITIS WITHOUT BLEEDING, UNSPECIFIED CHRONICITY, UNSPECIFIED GASTRITIS TYPE: ICD-10-CM

## 2024-08-06 PROCEDURE — 97110 THERAPEUTIC EXERCISES: CPT

## 2024-08-06 PROCEDURE — 3078F DIAST BP <80 MM HG: CPT | Performed by: INTERNAL MEDICINE

## 2024-08-06 PROCEDURE — 99214 OFFICE O/P EST MOD 30 MIN: CPT | Performed by: INTERNAL MEDICINE

## 2024-08-06 PROCEDURE — 3074F SYST BP LT 130 MM HG: CPT | Performed by: INTERNAL MEDICINE

## 2024-08-06 PROCEDURE — 97530 THERAPEUTIC ACTIVITIES: CPT

## 2024-08-06 PROCEDURE — 1123F ACP DISCUSS/DSCN MKR DOCD: CPT | Performed by: INTERNAL MEDICINE

## 2024-08-06 RX ORDER — FLUOXETINE HYDROCHLORIDE 40 MG/1
40 CAPSULE ORAL EVERY MORNING
Qty: 90 CAPSULE | Refills: 3 | Status: SHIPPED | OUTPATIENT
Start: 2024-08-06

## 2024-08-06 RX ORDER — HYDROXYZINE HYDROCHLORIDE 25 MG/1
25 TABLET, FILM COATED ORAL 2 TIMES DAILY
COMMUNITY
Start: 2024-08-02

## 2024-08-06 RX ORDER — AMLODIPINE BESYLATE 10 MG/1
10 TABLET ORAL EVERY MORNING
Qty: 90 TABLET | Refills: 3 | Status: SHIPPED | OUTPATIENT
Start: 2024-08-06

## 2024-08-06 RX ORDER — ATORVASTATIN CALCIUM 40 MG/1
40 TABLET, FILM COATED ORAL DAILY
Qty: 90 TABLET | Refills: 3 | Status: SHIPPED | OUTPATIENT
Start: 2024-08-06

## 2024-08-06 SDOH — ECONOMIC STABILITY: FOOD INSECURITY: WITHIN THE PAST 12 MONTHS, THE FOOD YOU BOUGHT JUST DIDN'T LAST AND YOU DIDN'T HAVE MONEY TO GET MORE.: PATIENT DECLINED

## 2024-08-06 SDOH — ECONOMIC STABILITY: INCOME INSECURITY: HOW HARD IS IT FOR YOU TO PAY FOR THE VERY BASICS LIKE FOOD, HOUSING, MEDICAL CARE, AND HEATING?: PATIENT DECLINED

## 2024-08-06 SDOH — ECONOMIC STABILITY: FOOD INSECURITY: WITHIN THE PAST 12 MONTHS, YOU WORRIED THAT YOUR FOOD WOULD RUN OUT BEFORE YOU GOT MONEY TO BUY MORE.: PATIENT DECLINED

## 2024-08-06 SDOH — ECONOMIC STABILITY: HOUSING INSECURITY
IN THE LAST 12 MONTHS, WAS THERE A TIME WHEN YOU DID NOT HAVE A STEADY PLACE TO SLEEP OR SLEPT IN A SHELTER (INCLUDING NOW)?: PATIENT DECLINED

## 2024-08-06 ASSESSMENT — ENCOUNTER SYMPTOMS
CHEST TIGHTNESS: 0
CONSTIPATION: 0
NAUSEA: 0
EYE PAIN: 0
BLOOD IN STOOL: 0
SHORTNESS OF BREATH: 0
VOMITING: 0
DIARRHEA: 0
SORE THROAT: 0
COUGH: 0
RHINORRHEA: 0
ABDOMINAL PAIN: 1

## 2024-08-06 NOTE — PROGRESS NOTES
osteoporosis without current pathological fracture  - DEXA (11/2018)  - started fosamax - had to stop due to stomach pain, discussed trying another type, switch to prolia or trying reclast infusion.   - had reclast      Adenomatous polyp of colon, unspecified part of colon  - last (6/23) -hepatic flexure polyp transverse colon polyp follow-up 5 years, path tubular adenoma x2    Acute pain of left knee  - poss strain and sprain from fall   - will check xray   - compression, rest, ice   - home exercises   - if not better ortho - declines at present      Return in about 6 months (around 2/6/2025) for check up and review.    No orders of the defined types were placed in this encounter.    Requested Prescriptions     Signed Prescriptions Disp Refills    amLODIPine (NORVASC) 10 MG tablet 90 tablet 3     Sig: Take 1 tablet by mouth every morning    atorvastatin (LIPITOR) 40 MG tablet 90 tablet 3     Sig: Take 1 tablet by mouth daily    FLUoxetine (PROZAC) 40 MG capsule 90 capsule 3     Sig: Take 1 capsule by mouth every morning     Taiwo Alston MD  8/6/2024  10:52 AM

## 2024-08-06 NOTE — PROGRESS NOTES
[facility]   Phone: [phone]   Fax: [phone]      Physical Therapy Daily Treatment Note    Date: 2024  Patient Name: Dean Nam  : 1950   MRN: [id number]  DOInjury: years  DOSx:  2024   Surgery: Right Reverse Total Shoulder Arthroplasty   Referring Provider:  Taiwo Palomo MD  [address] [facility]  [address] [place] [address]           Medical Diagnosis:     Z96.611 (ICD-10-CM) - Status post total shoulder arthroplasty, right     Outcome Measure:  Quickdash 79.55%    S:   Pt is 12 weeks postop.  Reports feeling well today.   O:   Time 0900-943     Visit   Repeat outcome measure at mid point and end.    Pain See above      ROM Joint/Motion:  Right Shoulder:  AROM: 85° Forward elevation,  15° ER,  IR to 50,   (er/ir test at approx 30* abd)    abd 75   PROM: 85° Forward elevation,  10° ER,  50° IR,(er/ir test at approx 30* abd)    abd 80      Left Shoulder:  AROM: 100° Forward elevation,  40° ER,  IR to 35, abd 125     Modalities      ICE 5 min     Manual      PROM within protocol guidelines            Stretch      Table slides Flex and scaption    Wall Flexion 10x 5s holds      Wall ER stretch      Towel IR stretch      IR reaching behind back      Exercise      Shrugs AROM 2 x 10      Pendulum Ex      UBE      Scap retraction  X 20      Pulleys - flex X 20      Pulleys-IR      SB roll out  X 10 with 5 sec hold      Supine wand chest press 2 x 10  Weighted wand    Supine wand flex 2 x 10  Weighted wand    Supine wand ER/IR      Serratus punch  2 x 10      Supine with arm extended toward ceiling circles cw, ccw X 10 each      Supine flexion  x 10      S-lying ABD 2 x 10  Chicken wing     S-lying ER 2 X 10       Standing wand flex      Standing flexion X 10      Standing ABD X 10      Elbow flex/extension X 20     Radial and ulnar deviation      Bicep curls 3 ways  2 x 10  1#    Wrist pronation/supination X 20 1#    Wrist flex/extension X 20 1#

## 2024-08-08 ENCOUNTER — TREATMENT (OUTPATIENT)
Dept: PHYSICAL THERAPY | Age: 74
End: 2024-08-08
Payer: MEDICARE

## 2024-08-08 DIAGNOSIS — Z96.611 STATUS POST TOTAL SHOULDER ARTHROPLASTY, RIGHT: Primary | ICD-10-CM

## 2024-08-08 PROCEDURE — 97110 THERAPEUTIC EXERCISES: CPT

## 2024-08-08 PROCEDURE — 97530 THERAPEUTIC ACTIVITIES: CPT

## 2024-08-08 NOTE — PROGRESS NOTES
rows to neutral  2 x 10      ROWS: H Functional activities  To aid in ROM and strength needed for reaching , lifting ,pushing and pulling at home/work    ROWS: M  \"    ROWS: L  \"    ER  \"    IR  \"                A:  Tolerated well.  Ice end of session. Pt reports \"aching\" after session.     P: Continue with rehab plan  SANTOS HAMILTON, PTA [id number]  Treatment Charges: Mins Units   Initial Evaluation     Re-Evaluation     Ther Exercise         TE 20 1   Manual Therapy     MT     Ther Activities        TA 10 1   Gait Training          GT     Neuro Re-education NR     Modalities     Non-Billable Service Time 10    Other     Total Time/Units 40 2

## 2024-08-13 ENCOUNTER — TREATMENT (OUTPATIENT)
Dept: PHYSICAL THERAPY | Age: 74
End: 2024-08-13
Payer: MEDICARE

## 2024-08-13 DIAGNOSIS — Z96.611 STATUS POST TOTAL SHOULDER ARTHROPLASTY, RIGHT: Primary | ICD-10-CM

## 2024-08-13 PROCEDURE — 97110 THERAPEUTIC EXERCISES: CPT

## 2024-08-13 PROCEDURE — 97530 THERAPEUTIC ACTIVITIES: CPT

## 2024-08-13 NOTE — PROGRESS NOTES
20 1#    Wrist flex/extension X 20 1#    Bent rows to neutral  2 x 10      ROWS: H Functional activities  To aid in ROM and strength needed for reaching , lifting ,pushing and pulling at home/work    ROWS: M  \"    ROWS: L  \"    ER  \"    IR  \"                A:  Tolerated well.  Ice end of session.     P: Continue with rehab plan  SANTOS HAMILTON, PTA [id number]  Treatment Charges: Mins Units   Initial Evaluation     Re-Evaluation     Ther Exercise         TE 20 1   Manual Therapy     MT     Ther Activities        TA 10 1   Gait Training          GT     Neuro Re-education NR     Modalities     Non-Billable Service Time 10    Other     Total Time/Units 40 2

## 2024-08-20 ENCOUNTER — TREATMENT (OUTPATIENT)
Dept: PHYSICAL THERAPY | Age: 74
End: 2024-08-20
Payer: MEDICARE

## 2024-08-20 DIAGNOSIS — Z96.611 STATUS POST TOTAL SHOULDER ARTHROPLASTY, RIGHT: Primary | ICD-10-CM

## 2024-08-20 PROCEDURE — 97110 THERAPEUTIC EXERCISES: CPT | Performed by: PHYSICAL THERAPIST

## 2024-08-20 PROCEDURE — 97530 THERAPEUTIC ACTIVITIES: CPT | Performed by: PHYSICAL THERAPIST

## 2024-08-20 NOTE — PROGRESS NOTES
[facility]   Phone: [phone]   Fax: [phone]      Physical Therapy Daily Treatment Note    Date: 2024  Patient Name: Dean Nam  : 1950   MRN: [id number]  DOInjury: years  DOSx:  2024   Surgery: Right Reverse Total Shoulder Arthroplasty   Referring Provider:  Taiwo Palmoo MD  [id number] [facility]  [address] [place] [address]           Medical Diagnosis:     Z96.611 (ICD-10-CM) - Status post total shoulder arthroplasty, right     Outcome Measure:  Quickdash 79.55%    S:   Pt is 14  weeks postop.  Reports slight pain in posterior shoulder.  Reports mostly feeling tightness and heaviness in arm. Reports sees surgeon for followup tomorrow.   O:   Time 1045 - 1122     Visit   Repeat outcome measure at mid point and end.    Pain See above      ROM Joint/Motion:  Right Shoulder:  AROM: 85° Forward elevation,  15° ER,  IR to 50,   (er/ir test at approx 30* abd)    abd 75   PROM: 85° Forward elevation,  10° ER,  50° IR,(er/ir test at approx 30* abd)    abd 80      Left Shoulder:  AROM: 100° Forward elevation,  40° ER,  IR to 35, abd 125     Modalities      ICE 8  min     Manual      PROM within protocol guidelines            Stretch      Table slides Flex and scaption    Wall Flexion 10x 5s holds      Wall ER stretch      Towel IR stretch      IR reaching behind back      Exercise      Shrugs AROM 2 x 10      Pendulum Ex      UBE      Scap retraction  X 20      Pulleys - flex X 20      Pulleys-IR      SB roll out  X 10 with 5 sec hold      Supine wand chest press 2 x 10  Weighted wand    Supine wand flex 2 x 10  Weighted wand    Supine wand ER/IR X 10 1#    Serratus punch  2 x 10      Supine with arm extended toward ceiling circles cw, ccw X 10 each  1#    Supine flexion  x 10  1#    S-lying ABD x 10  Straight arm ,1#    S-lying ER 2 X 10   1#    Standing wand flex      Standing flexion X 10      Standing ABD X 10      Elbow flex/extension     Radial

## 2024-08-21 ENCOUNTER — OFFICE VISIT (OUTPATIENT)
Dept: ORTHOPEDIC SURGERY | Age: 74
End: 2024-08-21
Payer: MEDICARE

## 2024-08-21 VITALS — BODY MASS INDEX: 32.14 KG/M2 | HEIGHT: 69 IN | WEIGHT: 217 LBS

## 2024-08-21 DIAGNOSIS — M19.011 PRIMARY OSTEOARTHRITIS OF RIGHT SHOULDER: ICD-10-CM

## 2024-08-21 DIAGNOSIS — Z96.611 STATUS POST TOTAL SHOULDER ARTHROPLASTY, RIGHT: Primary | ICD-10-CM

## 2024-08-21 PROCEDURE — 99213 OFFICE O/P EST LOW 20 MIN: CPT | Performed by: ORTHOPAEDIC SURGERY

## 2024-08-21 PROCEDURE — 1123F ACP DISCUSS/DSCN MKR DOCD: CPT | Performed by: ORTHOPAEDIC SURGERY

## 2024-08-21 NOTE — PROGRESS NOTES
[facility]   ORTHOPAEDIC SURGERY AND SPORTS MEDICINE  DATE OF VISIT: 08/21/24  Follow Up Post Operative Visit     CHIEF COMPLAINT:   Chief Complaint   Patient presents with    Follow Up After Procedure     Surgery: Right reverse total shoulder arthroplasty   Date: 05/13/2024      Post-Op Check     Rt shoulder s/p 3 months    Pain     Rt shoulder , c/o tightness / \" heavy \" / touch sensitivity - 4 / 10       Surgery: Right reverse total shoulder arthroplasty   Date: 05/13/2024    Subjective:    Dean Nam is here for follow up visit s/p above procedure.  He is doing well.  He has been making good progress with PT.  He still has some mild stiffness reaching behind his back otherwise doing very well.  He is happy with his progress    Controlled Substances Monitoring:        Physical Exam:    Height: 1.753 m (5' 9\"), Weight - Scale: 98.4 kg (217 lb)    General: Alert and oriented x3, no acute distress  Cardiovascular/pulmonary: No labored breathing, peripheral perfusion intact  Musculoskeletal:    Exam of the shoulder shows healed incision.  Forward elevation is to 140 degrees.  External rotation with the arm the side is 45 degrees.  Internal rotation is to his right SI joint      Imaging: No new imaging.  Previous images reviewed showing well aligned reverse total shoulder replacement    Assessment and Plan: 3 months out from right reverse total shoulder replacement  He is doing well.  He will finish out PT and transition to home exercise program.  We will see him back in 3 months for final visit.  We will likely perform another steroid injections in his left shoulder at that time if his pain warrants an injection.    Taiwo Palomo MD  Orthopaedic Surgery   8/21/24  10:42 AM

## 2024-08-22 ENCOUNTER — TREATMENT (OUTPATIENT)
Dept: PHYSICAL THERAPY | Age: 74
End: 2024-08-22
Payer: MEDICARE

## 2024-08-22 DIAGNOSIS — Z96.611 STATUS POST TOTAL SHOULDER ARTHROPLASTY, RIGHT: Primary | ICD-10-CM

## 2024-08-22 PROCEDURE — 97530 THERAPEUTIC ACTIVITIES: CPT

## 2024-08-22 PROCEDURE — 97110 THERAPEUTIC EXERCISES: CPT

## 2024-08-22 NOTE — PROGRESS NOTES
[facility]   Phone: [phone]   Fax: [phone]      Physical Therapy Daily Treatment Note    Date: 2024  Patient Name: Dean Nam  : 1950   MRN: [id number]  DOInjury: years  DOSx:  2024   Surgery: Right Reverse Total Shoulder Arthroplasty   Referring Provider:  Taiwo Palomo MD  [address] [facility]  [address] [place] [address]           Medical Diagnosis:     Z96.611 (ICD-10-CM) - Status post total shoulder arthroplasty, right     Outcome Measure:  Quickdash 79.55%    S:   Reports no pain today. Pt reports seeing surgeon yesterday and that he was happy with pt's progress. Per physician note, pt to finish out therapy and transition to HEP.    O:   Time 2009-1351     Visit   Repeat outcome measure at mid point and end.    Pain See above      ROM Joint/Motion:  Right Shoulder:  AROM: 85° Forward elevation,  15° ER,  IR to 50,   (er/ir test at approx 30* abd)    abd 75   PROM: 85° Forward elevation,  10° ER,  50° IR,(er/ir test at approx 30* abd)    abd 80      Left Shoulder:  AROM: 100° Forward elevation,  40° ER,  IR to 35, abd 125     Modalities      ICE     Manual      PROM within protocol guidelines            Stretch      Table slides Flex and scaption    Wall Flexion 10x 5s holds      Wall ER stretch      Towel IR stretch 10 x 10s hold     IR reaching behind back      Exercise      Shrugs AROM 2 x 10      Pendulum Ex      UBE      Scap retraction  X 20      Pulleys - flex X 20      Pulleys-IR      SB roll out  X 10 with 5 sec hold      Supine wand chest press 2 x 10  With 3.5#    Supine wand flex 2 x 10  With 3.5#    Supine wand ER/IR X 10     Serratus punch  2 x 10      Supine with arm extended toward ceiling circles cw, ccw X 10 each  2#    Supine flexion  x 10  2#    S-lying ABD x 10  Straight arm ,2#    S-lying ER 2 X 10   2#    Standing wand flex      Standing flexion X 10      Standing ABD X 10      Elbow flex/extension     Radial and

## 2024-08-27 ENCOUNTER — TREATMENT (OUTPATIENT)
Dept: PHYSICAL THERAPY | Age: 74
End: 2024-08-27

## 2024-08-27 DIAGNOSIS — Z96.611 STATUS POST TOTAL SHOULDER ARTHROPLASTY, RIGHT: Primary | ICD-10-CM

## 2024-08-27 NOTE — PROGRESS NOTES
[facility]   Phone: [phone]   Fax: [phone]      Physical Therapy Daily Treatment Note    Date: 2024  Patient Name: Dean Nam  : 1950   MRN: [id number]  DOInjury: years  DOSx:  2024   Surgery: Right Reverse Total Shoulder Arthroplasty   Referring Provider:  Taiwo Palomo MD  [address] [facility]  [address] [place] [address]           Medical Diagnosis:     Z96.611 (ICD-10-CM) - Status post total shoulder arthroplasty, right     Outcome Measure:  Quickdash 79.55%    S:   Reports tightness, no pain today.   O:   Time 1035 - 1120     Visit 10 /12  Repeat outcome measure at mid point and end.    Pain See above      ROM Joint/Motion:  Right Shoulder:  AROM: 85° Forward elevation,  15° ER,  IR to 50,   (er/ir test at approx 30* abd)    abd 75   PROM: 85° Forward elevation,  10° ER,  50° IR,(er/ir test at approx 30* abd)    abd 80      Left Shoulder:  AROM: 100° Forward elevation,  40° ER,  IR to 35, abd 125     Modalities      ICE 10  min     Manual      PROM within protocol guidelines            Stretch      Table slides Flex and scaption    Wall Flexion 10x 5s holds      Wall ER stretch      Towel IR stretch 10 x 10s hold     IR reaching behind back      Exercise      Shrugs AROM 2 x 10      Pendulum Ex      UBE      Scap retraction  X 20      Pulleys - flex X 20      Pulleys-IR      SB roll out      Supine wand chest press 2 x 10  With 3.5#    Supine wand flex 2 x 10  With 3.5#    Supine wand ER/IR     Serratus punch  2 x 10  2#    Supine with arm extended toward ceiling circles cw, ccw X 10 each  2#    Supine flexion  x 10  2#    S-lying ABD x 10  Straight arm ,2#    S-lying ER 2 X 10   2#    Standing wand flex      Standing flexion X 10      Standing ABD X 10      Elbow flex/extension     Radial and ulnar deviation      Bicep curls 3 ways  2 x 10  2#    Wrist pronation/supination 2#    Wrist flex/extension 2#    Bent rows to neutral      ROWS: H

## 2024-09-09 RX ORDER — HYDROXYZINE HYDROCHLORIDE 25 MG/1
25 TABLET, FILM COATED ORAL 2 TIMES DAILY
Qty: 60 TABLET | Refills: 2 | Status: SHIPPED | OUTPATIENT
Start: 2024-09-09

## 2024-09-10 ENCOUNTER — TREATMENT (OUTPATIENT)
Dept: PHYSICAL THERAPY | Age: 74
End: 2024-09-10
Payer: MEDICARE

## 2024-09-10 DIAGNOSIS — Z96.611 STATUS POST TOTAL SHOULDER ARTHROPLASTY, RIGHT: Primary | ICD-10-CM

## 2024-09-10 PROCEDURE — 97110 THERAPEUTIC EXERCISES: CPT

## 2024-09-10 PROCEDURE — 97530 THERAPEUTIC ACTIVITIES: CPT

## 2024-09-17 ENCOUNTER — TREATMENT (OUTPATIENT)
Dept: PHYSICAL THERAPY | Age: 74
End: 2024-09-17
Payer: MEDICARE

## 2024-09-17 DIAGNOSIS — Z96.611 STATUS POST TOTAL SHOULDER ARTHROPLASTY, RIGHT: Primary | ICD-10-CM

## 2024-09-17 PROCEDURE — 97164 PT RE-EVAL EST PLAN CARE: CPT | Performed by: PHYSICAL THERAPIST

## 2024-09-26 ENCOUNTER — OFFICE VISIT (OUTPATIENT)
Dept: FAMILY MEDICINE CLINIC | Age: 74
End: 2024-09-26

## 2024-09-26 VITALS
BODY MASS INDEX: 32.29 KG/M2 | HEIGHT: 69 IN | SYSTOLIC BLOOD PRESSURE: 110 MMHG | RESPIRATION RATE: 18 BRPM | OXYGEN SATURATION: 97 % | WEIGHT: 218 LBS | DIASTOLIC BLOOD PRESSURE: 60 MMHG | TEMPERATURE: 98 F | HEART RATE: 79 BPM

## 2024-09-26 DIAGNOSIS — L60.0 INGROWN TOENAIL OF LEFT FOOT: Primary | ICD-10-CM

## 2024-09-26 DIAGNOSIS — L03.818 CELLULITIS OF OTHER SPECIFIED SITE: ICD-10-CM

## 2024-10-03 ENCOUNTER — OFFICE VISIT (OUTPATIENT)
Dept: PODIATRY | Age: 74
End: 2024-10-03

## 2024-10-03 VITALS — BODY MASS INDEX: 32.29 KG/M2 | WEIGHT: 218 LBS | HEIGHT: 69 IN

## 2024-10-03 DIAGNOSIS — L60.0 INGROWN NAIL: Primary | ICD-10-CM

## 2024-10-03 DIAGNOSIS — L03.032 CELLULITIS OF LEFT TOE: ICD-10-CM

## 2024-10-03 DIAGNOSIS — M79.675 PAIN OF LEFT GREAT TOE: ICD-10-CM

## 2024-10-03 RX ORDER — CEPHALEXIN 500 MG/1
500 CAPSULE ORAL 2 TIMES DAILY
Qty: 14 CAPSULE | Refills: 0 | Status: SHIPPED | OUTPATIENT
Start: 2024-10-03 | End: 2024-10-10

## 2024-10-03 RX ADMIN — Medication 3 ML: at 13:51

## 2024-10-03 NOTE — PROGRESS NOTES
Patient presents today as a new patient referred by Amadeo Aelxandre MD for ingrown toenail of the left great toe. He is still on the Augmentin that was prescribed.  They were last seen by PCP, Taiwo Alston MD, on 2024.    Electronically signed by Karyna Haddad MA on 10/3/2024 at 10:18 AM        10/3/24  Dean Nam : 1950 Sex: male  Age: 74 y.o.    Patient was referred by Taiwo Alston MD    CC:    Painful ingrown left great toenail    HPI:   This pleasant 74-year-old male patient referred me today pain on the outside left great toenail.  Does have increased tenderness when he is wearing closed toed shoes.  Was seen express care and was started on antibiotics.  Still having tenderness and some redness on the outside left great toenail.  No recent injury or trauma is aware of.  No additional pedal complaints at this time.    ROS:  Const: Denies constitutional symptoms  Musculo: Denies symptoms other than stated above  Skin: Denies symptoms other than stated above       Current Outpatient Medications:     cephALEXin (KEFLEX) 500 MG capsule, Take 1 capsule by mouth 2 times daily for 7 days Take by mouth twice daily., Disp: 14 capsule, Rfl: 0    amoxicillin-clavulanate (AUGMENTIN) 875-125 MG per tablet, Take 1 tablet by mouth 2 times daily for 10 days, Disp: 20 tablet, Rfl: 0    hydrOXYzine HCl (ATARAX) 25 MG tablet, Take 1 tablet by mouth in the morning and at bedtime, Disp: 60 tablet, Rfl: 2    amLODIPine (NORVASC) 10 MG tablet, Take 1 tablet by mouth every morning, Disp: 90 tablet, Rfl: 3    atorvastatin (LIPITOR) 40 MG tablet, Take 1 tablet by mouth daily, Disp: 90 tablet, Rfl: 3    FLUoxetine (PROZAC) 40 MG capsule, Take 1 capsule by mouth every morning, Disp: 90 capsule, Rfl: 3    Multiple Vitamins-Minerals (MULTIVITAMIN ADULTS PO), Take by mouth daily, Disp: , Rfl:     omeprazole (PRILOSEC) 40 MG delayed release capsule, Take 1 capsule by mouth every morning (before breakfast), Disp: 90

## 2024-10-18 ENCOUNTER — OFFICE VISIT (OUTPATIENT)
Dept: PODIATRY | Age: 74
End: 2024-10-18

## 2024-10-18 VITALS — BODY MASS INDEX: 32.29 KG/M2 | WEIGHT: 218 LBS | HEIGHT: 69 IN

## 2024-10-18 DIAGNOSIS — L03.032 CELLULITIS OF LEFT TOE: ICD-10-CM

## 2024-10-18 DIAGNOSIS — L60.0 INGROWN NAIL: Primary | ICD-10-CM

## 2024-10-18 DIAGNOSIS — M79.675 PAIN OF LEFT GREAT TOE: ICD-10-CM

## 2024-10-18 NOTE — PROGRESS NOTES
Patient here for 2 wk post matrixectomy.  States feels a lot better.  Last visit, Taiwo Alston MD, 8/06/2024.    Electronically signed by Alber Tompkins DPM on 10/18/2024 at 12:44 PM    CC:    2-week follow-up partial nail avulsion lateral border left great toenail    HPI:   2-week follow-up partial nail avulsion lateral border left great toenail  Completed antibiotic.  Very pleased with progression denies any drainage or redness wearing a regular shoe today.  Presents with his wife.  Denies nausea vomiting fever chills shortness of breath.  Denies any pain left great toe or left great toenail today.  No additional pedal complaints.      ROS:  Const: Denies constitutional symptoms  Musculo: Denies symptoms other than stated above  Skin: Denies symptoms other than stated above       Current Outpatient Medications:     hydrOXYzine HCl (ATARAX) 25 MG tablet, Take 1 tablet by mouth in the morning and at bedtime, Disp: 60 tablet, Rfl: 2    amLODIPine (NORVASC) 10 MG tablet, Take 1 tablet by mouth every morning, Disp: 90 tablet, Rfl: 3    atorvastatin (LIPITOR) 40 MG tablet, Take 1 tablet by mouth daily, Disp: 90 tablet, Rfl: 3    FLUoxetine (PROZAC) 40 MG capsule, Take 1 capsule by mouth every morning, Disp: 90 capsule, Rfl: 3    Multiple Vitamins-Minerals (MULTIVITAMIN ADULTS PO), Take by mouth daily, Disp: , Rfl:     omeprazole (PRILOSEC) 40 MG delayed release capsule, Take 1 capsule by mouth every morning (before breakfast), Disp: 90 capsule, Rfl: 3    cycloSPORINE (RESTASIS) 0.05 % ophthalmic emulsion, USE ONE DROP INTO EACH EYE TWICE DAILY, Disp: , Rfl:     butalbital-acetaminophen-caffeine (FIORICET, ESGIC) -40 MG per tablet, Take 1 tablet by mouth every 4 hours as needed for Headaches, Disp: , Rfl:     aspirin 81 MG tablet, Take 1 tablet by mouth daily, Disp: , Rfl:     albuterol sulfate HFA (PROVENTIL;VENTOLIN;PROAIR) 108 (90 Base) MCG/ACT inhaler, Inhale into the lungs every 4 hours as needed

## 2024-11-12 ENCOUNTER — OFFICE VISIT (OUTPATIENT)
Dept: ORTHOPEDIC SURGERY | Age: 74
End: 2024-11-12
Payer: MEDICARE

## 2024-11-12 VITALS
OXYGEN SATURATION: 97 % | DIASTOLIC BLOOD PRESSURE: 78 MMHG | HEART RATE: 73 BPM | SYSTOLIC BLOOD PRESSURE: 160 MMHG | TEMPERATURE: 97.7 F

## 2024-11-12 DIAGNOSIS — M25.512 ACUTE PAIN OF LEFT SHOULDER: Primary | ICD-10-CM

## 2024-11-12 PROCEDURE — 3077F SYST BP >= 140 MM HG: CPT | Performed by: NURSE PRACTITIONER

## 2024-11-12 PROCEDURE — 99214 OFFICE O/P EST MOD 30 MIN: CPT | Performed by: NURSE PRACTITIONER

## 2024-11-12 PROCEDURE — 20550 NJX 1 TENDON SHEATH/LIGAMENT: CPT | Performed by: NURSE PRACTITIONER

## 2024-11-12 PROCEDURE — 3078F DIAST BP <80 MM HG: CPT | Performed by: NURSE PRACTITIONER

## 2024-11-12 PROCEDURE — 1123F ACP DISCUSS/DSCN MKR DOCD: CPT | Performed by: NURSE PRACTITIONER

## 2024-11-12 RX ORDER — TRIAMCINOLONE ACETONIDE 40 MG/ML
40 INJECTION, SUSPENSION INTRA-ARTICULAR; INTRAMUSCULAR ONCE
Status: COMPLETED | OUTPATIENT
Start: 2024-11-12 | End: 2024-11-12

## 2024-11-12 RX ORDER — BUPIVACAINE HYDROCHLORIDE 2.5 MG/ML
2 INJECTION, SOLUTION INFILTRATION; PERINEURAL ONCE
Status: COMPLETED | OUTPATIENT
Start: 2024-11-12 | End: 2024-11-12

## 2024-11-12 RX ADMIN — BUPIVACAINE HYDROCHLORIDE 5 MG: 2.5 INJECTION, SOLUTION INFILTRATION; PERINEURAL at 11:31

## 2024-11-12 RX ADMIN — TRIAMCINOLONE ACETONIDE 40 MG: 40 INJECTION, SUSPENSION INTRA-ARTICULAR; INTRAMUSCULAR at 11:31

## 2024-11-12 NOTE — PROGRESS NOTES
PROCEDURE NOTE:    DIAGNOSIS      Left shoulder pain.     PROCEDURE     Ultrasound-guided left biceps tendon sheath injection     PROCEDURAL PAUSE     Procedural pause conducted to verify: ?correct patient identity, procedure to be performed, and as applicable, correct side and site, correct patient position, and availability of implants, special equipment, or special requirements.     PROCEDURE DETAILS     The procedure was carried out under sterile technique.      Patient Position: ?Supine.     Localization Process: ?The biceps tendon sheath was evaluated under ultrasound prior to starting the procedure. ?The skin was prepped with Betadine and Alcohol.    Approach: ?In-plane.     Local Anesthesia: ?Local anesthesia was obtained with vapocoolant cold spray      Injection/Aspiration: ?A 25-gauge, 2-inch needle was advanced from an in-plane approach into the subacromial/subdeltoid bursa. ?After visualization of the needle tip in the target area and negative aspiration for blood, a mixture of 2 cc of 0.25% Marcaine and 1 cc of Kenalog was injected into the biceps tendon sheath with excellent sonographic flow. ?Images of procedure were permanently recorded.    Administrations This Visit       BUPivacaine (MARCAINE) 0.25 % injection 5 mg       Admin Date  11/12/2024 Action  Given Dose  5 mg Route  Intra-artICUlar Documented By  Kareem Ascencio, LEROY - CNP              triamcinolone acetonide (KENALOG-40) injection 40 mg       Admin Date  11/12/2024 Action  Given Dose  40 mg Route  Intra-artICUlar Documented By  Kareem Ascencio, LEROY - CNP                    Postprocedure Care: ?The patient will avoid heavy exertion with the shoulder and avoid soaking the shoulder under water for two days. ?The patient will contact me with any problems related to the injection.     PATIENT EDUCATION     Ready to learn, no apparent learning barriers were identified; learning preferences include listening. ?Explained diagnosis and

## 2024-11-12 NOTE — PROGRESS NOTES
[facility]   ORTHOPAEDIC SURGERY AND SPORTS MEDICINE  DATE OF VISIT: 11/12/24  Follow Up Shoulder Visit     CHIEF COMPLAINT:   Chief Complaint   Patient presents with    Follow-up     Left shoulder OA    Injections     Lt shoulder 7/10/2024 - requesting injection     Pain     8 / 10       HPI:    Dean Nam is a 74 y.o. year old male who presented to the office today for follow up of left shoulder pain, previously evaluated on 7/10/2024. Previous treatment has included cortisone injection. He reports symptoms are improved. The patient has responded to the treatment.      REVIEW OF SYSTEMS:     General: Negative Fever, chills, fatigue   Cardiovascular: Negative chest pain, palpitations  Respiratory: Equal chest expansion, negative shortness of breath   Skin: Negative rash, open wounds  Psych: Normal affect, mood stable  Neurologic: sensation grossly intact in extremities   Musculoskeletal: See HPI      Physical Exam:     BP: (!) 160/78    General: Alert and oriented x3, no acute distress  Cardiovascular/pulmonary: No labored breathing, peripheral perfusion intact  Musculoskeletal:    Left Shoulder:    negative visualized deformity or swelling.  Range of motion is 170/50/t10 forward elevation/external rotation/internal rotation    positive anterior shoulder tenderness on palpation     Cuff testing:  Mini test (supraspinatus): Intact  Speeds test (biceps): Positive for pain  Portage's test (labral): Positive for pain  Belly press test and lift off test (subscapularis): Intact        Controlled Substances Monitoring:      Imaging:  Left shoulder MRI reviewed showing inflammation of the long head biceps tendon, possible split tear.  Rotator cuff tendinosis,      Assessment: Left shoulder pain      Plan:   Patient presents to the office today for evaluation of left shoulder.  History, provider note, physical exam and imaging were reviewed.  On exam he has significant pain during speeds and Portage's test.  Treatment

## 2024-11-12 NOTE — PROGRESS NOTES
Patient blood pressure was taken twice, 165/93 (P 69) + 160/78 (P 73). Patient states he has takne his blood pressure medication and denies any issues . Instructed to keep an eye on BP and let PCP know if continues or if they start having blurred/double vision or horrible headaches to seek medical attention.

## 2024-12-11 ENCOUNTER — OFFICE VISIT (OUTPATIENT)
Dept: ORTHOPEDIC SURGERY | Age: 74
End: 2024-12-11
Payer: MEDICARE

## 2024-12-11 ENCOUNTER — OFFICE VISIT (OUTPATIENT)
Dept: FAMILY MEDICINE CLINIC | Age: 74
End: 2024-12-11

## 2024-12-11 VITALS
SYSTOLIC BLOOD PRESSURE: 124 MMHG | DIASTOLIC BLOOD PRESSURE: 82 MMHG | HEIGHT: 69 IN | TEMPERATURE: 97 F | BODY MASS INDEX: 32.53 KG/M2 | RESPIRATION RATE: 18 BRPM | HEART RATE: 70 BPM | OXYGEN SATURATION: 98 % | WEIGHT: 219.6 LBS

## 2024-12-11 VITALS — BODY MASS INDEX: 31.4 KG/M2 | WEIGHT: 212 LBS | HEIGHT: 69 IN

## 2024-12-11 DIAGNOSIS — S39.012D STRAIN OF LUMBAR REGION, SUBSEQUENT ENCOUNTER: Primary | ICD-10-CM

## 2024-12-11 DIAGNOSIS — F34.1 DYSTHYMIA: Primary | ICD-10-CM

## 2024-12-11 DIAGNOSIS — S39.012A STRAIN OF LUMBAR REGION, INITIAL ENCOUNTER: Primary | ICD-10-CM

## 2024-12-11 DIAGNOSIS — I10 PRIMARY HYPERTENSION: ICD-10-CM

## 2024-12-11 DIAGNOSIS — M62.830 LUMBAR PARASPINAL MUSCLE SPASM: ICD-10-CM

## 2024-12-11 DIAGNOSIS — M54.50 ACUTE RIGHT-SIDED LOW BACK PAIN, UNSPECIFIED WHETHER SCIATICA PRESENT: ICD-10-CM

## 2024-12-11 DIAGNOSIS — M25.551 PAIN OF RIGHT HIP: ICD-10-CM

## 2024-12-11 PROCEDURE — 1125F AMNT PAIN NOTED PAIN PRSNT: CPT | Performed by: PHYSICIAN ASSISTANT

## 2024-12-11 PROCEDURE — 1159F MED LIST DOCD IN RCRD: CPT | Performed by: PHYSICIAN ASSISTANT

## 2024-12-11 PROCEDURE — 99213 OFFICE O/P EST LOW 20 MIN: CPT | Performed by: PHYSICIAN ASSISTANT

## 2024-12-11 PROCEDURE — 1123F ACP DISCUSS/DSCN MKR DOCD: CPT | Performed by: PHYSICIAN ASSISTANT

## 2024-12-11 RX ORDER — TIZANIDINE HYDROCHLORIDE 2 MG/1
2 CAPSULE, GELATIN COATED ORAL 3 TIMES DAILY
Qty: 9 CAPSULE | Refills: 0 | Status: SHIPPED | OUTPATIENT
Start: 2024-12-11 | End: 2024-12-14

## 2024-12-11 RX ORDER — HYDROXYZINE HYDROCHLORIDE 25 MG/1
25 TABLET, FILM COATED ORAL 2 TIMES DAILY
Qty: 60 TABLET | Refills: 2 | Status: SHIPPED | OUTPATIENT
Start: 2024-12-11

## 2024-12-11 RX ORDER — PREDNISONE 10 MG/1
TABLET ORAL
Qty: 24 TABLET | Refills: 0 | Status: SHIPPED | OUTPATIENT
Start: 2024-12-11

## 2024-12-11 RX ORDER — TRIAMCINOLONE ACETONIDE 40 MG/ML
40 INJECTION, SUSPENSION INTRA-ARTICULAR; INTRAMUSCULAR ONCE
Status: COMPLETED | OUTPATIENT
Start: 2024-12-11 | End: 2024-12-11

## 2024-12-11 RX ADMIN — TRIAMCINOLONE ACETONIDE 40 MG: 40 INJECTION, SUSPENSION INTRA-ARTICULAR; INTRAMUSCULAR at 15:05

## 2024-12-11 NOTE — TELEPHONE ENCOUNTER
Patient's wife calling in for refill.  Patient is almost out of medication.      Last Appointment:  8/6/2024  Future Appointments   Date Time Provider Department Center   1/8/2025  3:00 PM Taiwo Palomo MD  [facility]   2/4/2025 10:30 AM Taiwo Alston MD [facility] DEP

## 2024-12-11 NOTE — PROGRESS NOTES
OFFICE NOTE    24  Name: Dean Nam  :1950   Sex:male   Age:74 y.o.      SUBJECTIVE  Chief Complaint   Patient presents with    Lower Back Pain     Right lower back pain        HPI Was in to see Crista. Was requesting \"shot in my back\" and didn't get one. Was given Rx for oral prednisone and Zanaflex    Review of Systems   No radicular pain, bowel or bladder incontinance. Has been helping wife who had recent THR      Current Outpatient Medications:     hydrOXYzine HCl (ATARAX) 25 MG tablet, Take 1 tablet by mouth in the morning and at bedtime, Disp: 60 tablet, Rfl: 2    predniSONE (DELTASONE) 10 MG tablet, 3 tab PO daily x 4 days, then 2 tab PO daily x 4 days then 1 tab PO daily x 4 days then STOP, Disp: 24 tablet, Rfl: 0    tiZANidine (ZANAFLEX) 2 MG capsule, Take 1 capsule by mouth 3 times daily for 3 days, Disp: 9 capsule, Rfl: 0    amLODIPine (NORVASC) 10 MG tablet, Take 1 tablet by mouth every morning, Disp: 90 tablet, Rfl: 3    atorvastatin (LIPITOR) 40 MG tablet, Take 1 tablet by mouth daily, Disp: 90 tablet, Rfl: 3    FLUoxetine (PROZAC) 40 MG capsule, Take 1 capsule by mouth every morning, Disp: 90 capsule, Rfl: 3    Multiple Vitamins-Minerals (MULTIVITAMIN ADULTS PO), Take by mouth daily, Disp: , Rfl:     omeprazole (PRILOSEC) 40 MG delayed release capsule, Take 1 capsule by mouth every morning (before breakfast), Disp: 90 capsule, Rfl: 3    cycloSPORINE (RESTASIS) 0.05 % ophthalmic emulsion, USE ONE DROP INTO EACH EYE TWICE DAILY, Disp: , Rfl:     butalbital-acetaminophen-caffeine (FIORICET, ESGIC) -40 MG per tablet, Take 1 tablet by mouth every 4 hours as needed for Headaches, Disp: , Rfl:     aspirin 81 MG tablet, Take 1 tablet by mouth daily, Disp: , Rfl:     albuterol sulfate HFA (PROVENTIL;VENTOLIN;PROAIR) 108 (90 Base) MCG/ACT inhaler, Inhale into the lungs every 4 hours as needed Instructed to take am of procedure if needed and bring dos, Disp: , Rfl:

## 2024-12-11 NOTE — PATIENT INSTRUCTIONS
*At this time I have recommended an oral steroid, Prednisone 10mg 3 tablets once daily by mouth for 4 days, then 2 tablets once daily for 4 days, then 1 tablet once daily for 4 days then STOP. I did discuss potential side effect such as GI upset, mood changes, depression, anxiety, change in sleep habits.  The patient develops any of these signs or symptoms they will call the office immediately and we will discontinue the medication in appropriate fashion.  They are aware that he should not use any other oral anti-inflammatories while on the oral steroids.  They can use Tylenol OTC PRN.    *Pt has been prescribed a muscle relaxer, Zanaflex 2mg 1 tablet by mouth three times daily as needed for muscle spasm for no more than 3 days dispense 9 tablets with zero refills.  Pt is aware they cannot drive or operate heavy machinery while on this medication.    *I did discuss the warning signs of saddle anesthesia with the patient. If they develop inner thigh or genital numbness, loss of bowel or bladder control, they would need to proceed to the nearest emergency room urgently.  Pt voiced understanding and agrees.

## 2024-12-11 NOTE — PROGRESS NOTES
[facility]  New Patient Note      CHIEF COMPLAINT: No chief complaint on file.      HISTORY OF PRESENT ILLNESS:                The patient is a 74 y.o. male who presents today with complaints of right sided hip/LBP x 2 weeks, no known COLLETTE.  States he has been helping his wife who had a hip fx a month ago.  Pt localizes the pain to right side lumbar spine.  Pt denies any numbness, tingling, loss of sensation or radiation of symptoms into toes.  Pt denies any signs or symptoms of saddle anesthesia such as genital numbness, loss of bowel or bladder control.  Pain is worse with standing, sitting, ambulation, trunk rotation.  They have tried at home therapies of ice, heat.  Pt has hx of cervical and lumbar spine issues.       Past Medical History:        Diagnosis Date    Abdominal pain 2023    Arthritis     Asthma     Depression     GERD (gastroesophageal reflux disease)     History of colon polyps     Hyperlipidemia     Hypertension     Migraine     Osteoporosis     Right retinal detachment 04/2019    Shoulder pain, right      Past Surgical History:        Procedure Laterality Date    CATARACT REMOVAL WITH IMPLANT Right     COLONOSCOPY N/A 1/14/2020    COLONOSCOPY WITH BIOPSY performed by Faisal Desir MD at [facility] ENDOSCOPY    COLONOSCOPY N/A 6/6/2023    COLONOSCOPY WITH BIOPSY performed by Faisal Desir MD at [facility] ENDOSCOPY    FACIAL RECONSTRUCTION SURGERY      NECK SURGERY  1994    RETINAL DETACHMENT SURGERY Right 4/25/2019    RIGHT EYE RETINAL DETACHMENT REPAIR, PARS PLANA VITRECTOMY 25 GAUGE, ENDO LASER GAS FLUID EXCHANGE (SF6)++LATEX ALLERGY++ performed by Lexa Mead MD at [facility] OR    SHOULDER SURGERY Right 5/13/2024    RIGHT TOTAL SHOULDER REVERSE performed by Taiwo Palomo MD at [facility] OR    TONSILLECTOMY AND ADENOIDECTOMY      TOOTH EXTRACTION      UPPER GASTROINTESTINAL ENDOSCOPY N/A 1/14/2020    EGD BIOPSY performed by Faisal Desir MD at [facility] ENDOSCOPY    UPPER

## 2024-12-24 ENCOUNTER — TELEPHONE (OUTPATIENT)
Dept: FAMILY MEDICINE CLINIC | Age: 74
End: 2024-12-24

## 2024-12-24 RX ORDER — TIZANIDINE 2 MG/1
2 TABLET ORAL 3 TIMES DAILY PRN
Qty: 30 TABLET | Refills: 0 | Status: SHIPPED | OUTPATIENT
Start: 2024-12-24

## 2024-12-24 RX ORDER — METHYLPREDNISOLONE 4 MG/1
TABLET ORAL
Qty: 1 KIT | Refills: 0 | Status: SHIPPED | OUTPATIENT
Start: 2024-12-24 | End: 2024-12-30

## 2024-12-24 NOTE — TELEPHONE ENCOUNTER
Requested Prescriptions     Signed Prescriptions Disp Refills    methylPREDNISolone (MEDROL DOSEPACK) 4 MG tablet 1 kit 0     Sig: Take by mouth.     Authorizing Provider: COLBY MORRELL    tiZANidine (ZANAFLEX) 2 MG tablet 30 tablet 0     Sig: Take 1 tablet by mouth 3 times daily as needed (pain, spasm)     Authorizing Provider: COLBY MORRELL     Medication sent to pharmacy as above    Would recommend evaluation in urgent care if symptoms or not improving.     Recommend considering physical therapy  Will need to consider referral to pain management for additional therapy options

## 2024-12-24 NOTE — TELEPHONE ENCOUNTER
Patients wife called to report  is having increased back pain since steroid has been completed. This was completed Sunday. She is wondering if you could call in more to get through the holidays? He was taking the Tizanidine as well but is also out of it.   He will follow up with Crista in Ortho walk in- but they are closed this week.   Pharmacy- [facility] in [place]

## 2025-01-08 ENCOUNTER — OFFICE VISIT (OUTPATIENT)
Dept: ORTHOPEDIC SURGERY | Age: 75
End: 2025-01-08
Payer: MEDICARE

## 2025-01-08 VITALS
BODY MASS INDEX: 31.4 KG/M2 | DIASTOLIC BLOOD PRESSURE: 85 MMHG | OXYGEN SATURATION: 98 % | RESPIRATION RATE: 18 BRPM | SYSTOLIC BLOOD PRESSURE: 129 MMHG | HEIGHT: 69 IN | WEIGHT: 212 LBS | TEMPERATURE: 98.7 F | HEART RATE: 74 BPM

## 2025-01-08 DIAGNOSIS — Z96.611 S/P REVERSE TOTAL SHOULDER ARTHROPLASTY, RIGHT: Primary | ICD-10-CM

## 2025-01-08 PROCEDURE — 1159F MED LIST DOCD IN RCRD: CPT | Performed by: ORTHOPAEDIC SURGERY

## 2025-01-08 PROCEDURE — 99213 OFFICE O/P EST LOW 20 MIN: CPT | Performed by: ORTHOPAEDIC SURGERY

## 2025-01-08 PROCEDURE — 3074F SYST BP LT 130 MM HG: CPT | Performed by: ORTHOPAEDIC SURGERY

## 2025-01-08 PROCEDURE — 3079F DIAST BP 80-89 MM HG: CPT | Performed by: ORTHOPAEDIC SURGERY

## 2025-01-08 PROCEDURE — 1123F ACP DISCUSS/DSCN MKR DOCD: CPT | Performed by: ORTHOPAEDIC SURGERY

## 2025-01-08 NOTE — PROGRESS NOTES
[facility]   ORTHOPAEDIC SURGERY AND SPORTS MEDICINE  DATE OF VISIT: 01/08/25  Follow Up Post Operative Visit     CHIEF COMPLAINT:   Chief Complaint   Patient presents with    Follow Up After Procedure     Surgery: Right reverse total shoulder arthroplasty   Date: 05/13/2024      Post-Op Check     7 months     Other     States soreness / fatigue when washing or drying his hair       Surgery: Right reverse total shoulder arthroplasty   Date: 05/13/2024    Subjective:    Dean Nam is here for follow up visit s/p above procedure.  He is doing well.  He has been doing very well.    Controlled Substances Monitoring:        Physical Exam:    Height: 1.753 m (5' 9\"), Weight - Scale: 96.2 kg (212 lb), BP: 129/85    General: Alert and oriented x3, no acute distress  Cardiovascular/pulmonary: No labored breathing, peripheral perfusion intact  Musculoskeletal:    Exam of the shoulder today shows range of motion 150/45/L1.  There is good strength with muscle testing.  Incision is healed.      Imaging: X-rays show well aligned reverse total shoulder replacement    Assessment and Plan: He is over 6 months out from right reverse total shoulder replacement doing very well  He will continue with activities as tolerated without restrictions.  We will see him back as needed    Taiwo Palomo MD  Orthopaedic Surgery   1/8/25  3:24 PM

## 2025-01-27 DIAGNOSIS — R10.84 GENERALIZED ABDOMINAL PAIN: ICD-10-CM

## 2025-01-27 RX ORDER — OMEPRAZOLE 40 MG/1
40 CAPSULE, DELAYED RELEASE ORAL
Qty: 90 CAPSULE | Refills: 1 | Status: SHIPPED | OUTPATIENT
Start: 2025-01-27

## 2025-01-27 NOTE — TELEPHONE ENCOUNTER
Last Appointment:  8/6/2024  Future Appointments   Date Time Provider Department Center   2/4/2025 10:30 AM Taiwo Alston MD [facility] DEP

## 2025-03-13 DIAGNOSIS — F34.1 DYSTHYMIA: ICD-10-CM

## 2025-03-13 RX ORDER — HYDROXYZINE HYDROCHLORIDE 25 MG/1
25 TABLET, FILM COATED ORAL 2 TIMES DAILY
Qty: 60 TABLET | Refills: 2 | Status: SHIPPED | OUTPATIENT
Start: 2025-03-13

## 2025-03-13 NOTE — TELEPHONE ENCOUNTER
Last Appointment:  8/6/2024  Future Appointments   Date Time Provider Department Center   5/19/2025 10:00 AM Taiwo Alston MD [facility] DE[facility]

## 2025-04-07 ENCOUNTER — OFFICE VISIT (OUTPATIENT)
Dept: FAMILY MEDICINE CLINIC | Age: 75
End: 2025-04-07
Payer: MEDICARE

## 2025-04-07 VITALS
TEMPERATURE: 97.3 F | RESPIRATION RATE: 18 BRPM | HEART RATE: 78 BPM | HEIGHT: 69 IN | WEIGHT: 221 LBS | SYSTOLIC BLOOD PRESSURE: 110 MMHG | DIASTOLIC BLOOD PRESSURE: 60 MMHG | OXYGEN SATURATION: 99 % | BODY MASS INDEX: 32.73 KG/M2

## 2025-04-07 DIAGNOSIS — H61.21 IMPACTED CERUMEN OF RIGHT EAR: Primary | ICD-10-CM

## 2025-04-07 PROCEDURE — 1159F MED LIST DOCD IN RCRD: CPT | Performed by: NURSE PRACTITIONER

## 2025-04-07 PROCEDURE — 3078F DIAST BP <80 MM HG: CPT | Performed by: NURSE PRACTITIONER

## 2025-04-07 PROCEDURE — 99213 OFFICE O/P EST LOW 20 MIN: CPT | Performed by: NURSE PRACTITIONER

## 2025-04-07 PROCEDURE — 1123F ACP DISCUSS/DSCN MKR DOCD: CPT | Performed by: NURSE PRACTITIONER

## 2025-04-07 PROCEDURE — 3074F SYST BP LT 130 MM HG: CPT | Performed by: NURSE PRACTITIONER

## 2025-04-07 PROCEDURE — 69210 REMOVE IMPACTED EAR WAX UNI: CPT | Performed by: NURSE PRACTITIONER

## 2025-04-07 NOTE — PROGRESS NOTES
light.    Avoid Q-tips.  Debrox as needed. Additional symptomatic relief discussed. F/u PCP in 5-7 days if symptoms persist. ED sooner if symptoms worsen or change. ED immediately with fever, worsening ear pain, CP, dyspnea, or dysphagia. Pt is in agreement with this care plan. All questions answered.    No follow-ups on file.    LEROY Ramey - NP

## 2025-05-19 ENCOUNTER — OFFICE VISIT (OUTPATIENT)
Dept: FAMILY MEDICINE CLINIC | Age: 75
End: 2025-05-19
Payer: MEDICARE

## 2025-05-19 VITALS
SYSTOLIC BLOOD PRESSURE: 100 MMHG | RESPIRATION RATE: 20 BRPM | WEIGHT: 221 LBS | DIASTOLIC BLOOD PRESSURE: 66 MMHG | OXYGEN SATURATION: 95 % | HEART RATE: 70 BPM | HEIGHT: 69 IN | TEMPERATURE: 98.9 F | BODY MASS INDEX: 32.73 KG/M2

## 2025-05-19 VITALS
DIASTOLIC BLOOD PRESSURE: 66 MMHG | TEMPERATURE: 98.9 F | RESPIRATION RATE: 20 BRPM | HEART RATE: 70 BPM | WEIGHT: 221 LBS | OXYGEN SATURATION: 95 % | BODY MASS INDEX: 32.73 KG/M2 | HEIGHT: 69 IN | SYSTOLIC BLOOD PRESSURE: 100 MMHG

## 2025-05-19 DIAGNOSIS — F34.1 DYSTHYMIA: ICD-10-CM

## 2025-05-19 DIAGNOSIS — G89.29 CHRONIC NONINTRACTABLE HEADACHE, UNSPECIFIED HEADACHE TYPE: ICD-10-CM

## 2025-05-19 DIAGNOSIS — M54.50 CHRONIC BILATERAL LOW BACK PAIN WITHOUT SCIATICA: ICD-10-CM

## 2025-05-19 DIAGNOSIS — M81.8 OTHER OSTEOPOROSIS WITHOUT CURRENT PATHOLOGICAL FRACTURE: ICD-10-CM

## 2025-05-19 DIAGNOSIS — Z00.00 MEDICARE ANNUAL WELLNESS VISIT, SUBSEQUENT: Primary | ICD-10-CM

## 2025-05-19 DIAGNOSIS — K29.70 GASTRITIS WITHOUT BLEEDING, UNSPECIFIED CHRONICITY, UNSPECIFIED GASTRITIS TYPE: ICD-10-CM

## 2025-05-19 DIAGNOSIS — G89.4 CHRONIC PAIN DISORDER: ICD-10-CM

## 2025-05-19 DIAGNOSIS — I10 ESSENTIAL HYPERTENSION: Primary | ICD-10-CM

## 2025-05-19 DIAGNOSIS — R51.9 CHRONIC NONINTRACTABLE HEADACHE, UNSPECIFIED HEADACHE TYPE: ICD-10-CM

## 2025-05-19 DIAGNOSIS — G89.29 CHRONIC BILATERAL LOW BACK PAIN WITHOUT SCIATICA: ICD-10-CM

## 2025-05-19 DIAGNOSIS — R10.84 GENERALIZED ABDOMINAL PAIN: ICD-10-CM

## 2025-05-19 DIAGNOSIS — N18.2 STAGE 2 CHRONIC KIDNEY DISEASE: ICD-10-CM

## 2025-05-19 DIAGNOSIS — E78.2 MIXED HYPERLIPIDEMIA: ICD-10-CM

## 2025-05-19 PROCEDURE — 1123F ACP DISCUSS/DSCN MKR DOCD: CPT | Performed by: INTERNAL MEDICINE

## 2025-05-19 PROCEDURE — G0439 PPPS, SUBSEQ VISIT: HCPCS | Performed by: INTERNAL MEDICINE

## 2025-05-19 PROCEDURE — 3078F DIAST BP <80 MM HG: CPT | Performed by: INTERNAL MEDICINE

## 2025-05-19 PROCEDURE — 3074F SYST BP LT 130 MM HG: CPT | Performed by: INTERNAL MEDICINE

## 2025-05-19 PROCEDURE — 1159F MED LIST DOCD IN RCRD: CPT | Performed by: INTERNAL MEDICINE

## 2025-05-19 PROCEDURE — 99214 OFFICE O/P EST MOD 30 MIN: CPT | Performed by: INTERNAL MEDICINE

## 2025-05-19 PROCEDURE — 1160F RVW MEDS BY RX/DR IN RCRD: CPT | Performed by: INTERNAL MEDICINE

## 2025-05-19 RX ORDER — FLUOXETINE HYDROCHLORIDE 40 MG/1
40 CAPSULE ORAL EVERY MORNING
Qty: 90 CAPSULE | Refills: 3 | Status: SHIPPED | OUTPATIENT
Start: 2025-05-19

## 2025-05-19 RX ORDER — OMEPRAZOLE 40 MG/1
40 CAPSULE, DELAYED RELEASE ORAL
Qty: 90 CAPSULE | Refills: 3 | Status: SHIPPED | OUTPATIENT
Start: 2025-05-19

## 2025-05-19 RX ORDER — ATORVASTATIN CALCIUM 40 MG/1
40 TABLET, FILM COATED ORAL DAILY
Qty: 90 TABLET | Refills: 3 | Status: SHIPPED | OUTPATIENT
Start: 2025-05-19

## 2025-05-19 RX ORDER — AMLODIPINE BESYLATE 10 MG/1
10 TABLET ORAL EVERY MORNING
Qty: 90 TABLET | Refills: 3 | Status: SHIPPED | OUTPATIENT
Start: 2025-05-19

## 2025-05-19 RX ORDER — BUSPIRONE HYDROCHLORIDE 5 MG/1
5 TABLET ORAL 2 TIMES DAILY
Qty: 60 TABLET | Refills: 5 | Status: SHIPPED | OUTPATIENT
Start: 2025-05-19 | End: 2025-11-15

## 2025-05-19 SDOH — ECONOMIC STABILITY: FOOD INSECURITY: WITHIN THE PAST 12 MONTHS, THE FOOD YOU BOUGHT JUST DIDN'T LAST AND YOU DIDN'T HAVE MONEY TO GET MORE.: PATIENT DECLINED

## 2025-05-19 SDOH — ECONOMIC STABILITY: FOOD INSECURITY: WITHIN THE PAST 12 MONTHS, YOU WORRIED THAT YOUR FOOD WOULD RUN OUT BEFORE YOU GOT MONEY TO BUY MORE.: PATIENT DECLINED

## 2025-05-19 ASSESSMENT — ENCOUNTER SYMPTOMS
COUGH: 0
NAUSEA: 0
VOMITING: 0
SORE THROAT: 0
BLOOD IN STOOL: 0
RHINORRHEA: 0
SHORTNESS OF BREATH: 0
DIARRHEA: 0
CHEST TIGHTNESS: 0
CONSTIPATION: 0
EYE PAIN: 0
ABDOMINAL PAIN: 1

## 2025-05-19 ASSESSMENT — PATIENT HEALTH QUESTIONNAIRE - PHQ9
6. FEELING BAD ABOUT YOURSELF - OR THAT YOU ARE A FAILURE OR HAVE LET YOURSELF OR YOUR FAMILY DOWN: SEVERAL DAYS
SUM OF ALL RESPONSES TO PHQ QUESTIONS 1-9: 6
1. LITTLE INTEREST OR PLEASURE IN DOING THINGS: SEVERAL DAYS
8. MOVING OR SPEAKING SO SLOWLY THAT OTHER PEOPLE COULD HAVE NOTICED. OR THE OPPOSITE, BEING SO FIGETY OR RESTLESS THAT YOU HAVE BEEN MOVING AROUND A LOT MORE THAN USUAL: NOT AT ALL
SUM OF ALL RESPONSES TO PHQ QUESTIONS 1-9: 6
9. THOUGHTS THAT YOU WOULD BE BETTER OFF DEAD, OR OF HURTING YOURSELF: NOT AT ALL
2. FEELING DOWN, DEPRESSED OR HOPELESS: MORE THAN HALF THE DAYS
10. IF YOU CHECKED OFF ANY PROBLEMS, HOW DIFFICULT HAVE THESE PROBLEMS MADE IT FOR YOU TO DO YOUR WORK, TAKE CARE OF THINGS AT HOME, OR GET ALONG WITH OTHER PEOPLE: NOT DIFFICULT AT ALL
4. FEELING TIRED OR HAVING LITTLE ENERGY: MORE THAN HALF THE DAYS
3. TROUBLE FALLING OR STAYING ASLEEP: NOT AT ALL
7. TROUBLE CONCENTRATING ON THINGS, SUCH AS READING THE NEWSPAPER OR WATCHING TELEVISION: NOT AT ALL
5. POOR APPETITE OR OVEREATING: NOT AT ALL

## 2025-05-19 ASSESSMENT — LIFESTYLE VARIABLES
HOW MANY STANDARD DRINKS CONTAINING ALCOHOL DO YOU HAVE ON A TYPICAL DAY: 1 OR 2
HOW OFTEN DO YOU HAVE A DRINK CONTAINING ALCOHOL: 2-3 TIMES A WEEK

## 2025-05-19 NOTE — PROGRESS NOTES
Medicare Annual Wellness Visit    Dean Nam is here for Medicare AWV    Assessment & Plan   Medicare annual wellness visit, subsequent       Health Maintenance   - immunizations:   Influenza Vaccination  - (9/14/2015) , (2017), (2019), (2020), (2022), (2023), (2025)   Pneumonia Vaccination - (10/2016) - prevnar, (10/2017) - pneumococcal 23  Zoster/Shingles Vaccine - (2020) x 2   Tetanus Vaccination  - (2015) - [facility]  covid (1/2021) #1, (2/21/2021) #2, (10/20/2021)#3  (7/17/2022) #4[place] pfizer, (1/2023) bivalent pfizer, (2024)     - Screenings:   Bone Density Test Screening - (10/25/2018)  Colonoscopy - (2014), (1/2020) - Minimal diverticulosis, sigmoid polyp x2 s/p polypectomy 3 to 4 mm, hemrrhoid, path - tubular and hyperplastic,  (6/23) -hepatic flexure polyp transverse colon polyp follow-up 5 years, path tubular adenoma x2    Prostate    Return for Medicare Annual Wellness Visit in 1 year.       Subjective     Patient's complete Health Risk Assessment and screening values have been reviewed and are found in Flowsheets. The following problems were reviewed today and where indicated follow up appointments were made and/or referrals ordered.    Positive Risk Factor Screenings with Interventions:     Cognitive:   Clock Drawing Test (CDT): (!) Abnormal  Words recalled: 3 Words Recalled  Total Score: 3  Total Score Interpretation: Normal Mini-Cog  Interventions:  See AVS for additional education material    Depression:  PHQ-2 Score: 3  PHQ-9 Total Score: 6  Total Score Interpretation: 5-9 = mild depression  Interventions:  On prozac, will add buspar, declines psychlogy   See AVS for additional education material       Controlled Medication Review:    Today's Pain Level: No data recorded   Opioid Risk: (Low risk score <55) Opioid risk score: 12    Patient is low risk for opioid use disorder or overdose.    Last PDMP Ileana as Reviewed:  Review User Review Instant Review Result   ILEANA OLIVIA 5/24/2024  8:59 AM

## 2025-05-19 NOTE — PROGRESS NOTES
[facility]   Internal Medicine     2025  Dean Nam : 1950 Sex: male  Age:74 y.o.    Chief Complaint   Patient presents with    Follow-up     Routine 6 month f/u     Shoulder Pain     left    Leg Pain     Left - aches and burns        HPI:   Patient presents to office for evaluation of the following medical concerns.    - States son was diagnosed with cancer.     Urgent care () -ingrown toenail starting Augmentin for possible cellulitis referral to podiatry  Pod (10/24) -ingrown toenail procedure nail avulsion Keflex added wound care follow-up 2 weeks  - improved     - State pain to bilateral shoulders. States had right shoulder injection per ortho ().  States had MRI on right, shows partial tears, fluid and degenerative changes. States [facility], Was seen in ER () - pain, xr lt shoulder moderate joint space narrowing of the left glenohumeral joint with bony spurring,  Moderate osteoarthritic changes, er treatment norco, declines splint, rice treatment. Has seen orthopedics.   - States s/p () - op right reverse total shoulder arthroplasty,    - Reviewed note Ortho (2025) - stable post op course follow up as needed      - States pain to left knee. States still with pain. States taking glucosamine.   - States has had burning into left leg      - States having generalized abdominal pain, has improved and stable. Found duodenal stricture.   - States has had EGD and colonoscopy (2020)- showed gastritis and duodenal stricture, had follow up EGD and had dilation (2020)  - referred to endo surgery.   - Had EGD with [facility] (10/20) - Duodenal stricture just distal to the duodenal bulb path - blood with few benign glandular cells. States some left sided soreness.   - Last EGD () -prior duodenal stricture patent but friable no dilation, duodenal diverticulum, mild gastritis, esophagus normal, path reactive gastropathy mild duodenum

## 2025-05-19 NOTE — PATIENT INSTRUCTIONS
Hands   Baby boomers entering the [place] years will continue to see the development of new products to help older adults live safely and independently in spite of age-related changes.  Making Life More Livable  , by [employer], lists over 1,000 products for \"living well in the mature years,\" such as bathing and mobility aids, household security devices, ergonomically designed knives and peelers, and faucet valves and knobs for temperature control. Medical supply stores and organizations are good sources of information about products that improve your quality of life and insure your safety.     Last Reviewed: November 2009 Amadeo Hooper MD   Updated: 3/7/2011

## 2025-05-27 LAB
A/G RATIO, EXTERNAL: NORMAL
ALBUMIN, EXTERNAL: 4.2
ALK PHOS, EXTERNAL: 71
ANION GAP, EXTERNAL: 17
BILI DIRECT, EXTERNAL: NORMAL
BILI TOTAL, EXTERNAL: 1
BUN, EXTERNAL: 27.3
BUN/CREATININE RATIO, EXTERNAL: NORMAL
CALCIUM, EXTERNAL: 9.5
CALCIUM, ION, EXTERNAL: NORMAL
CHLORIDE, EXTERNAL: 109
CHOLESTEROL, TOTAL: 126 MG/DL
CHOLESTEROL/HDL RATIO: ABNORMAL
CO2, EXTERNAL: 20
CREATININE, EXTERNAL: 1.3
EGFR IF AFA, EXTERNAL: NORMAL
EGFR IF NONAFRICAN AMERICAN: 58
GLOBULIN, EXTERNAL: NORMAL
GLUCOSE SER, EXTERNAL: 72
HCT, EXTERNAL: 44.3
HDLC SERPL-MCNC: 33 MG/DL (ref 35–70)
HGB, EXTERNAL: 14.7
LDL CHOLESTEROL: 83
MCH, EXTERNAL: 30.5
MCHC, EXTERNAL: 33.2
MCV, EXTERNAL: 91.7
MPV, EXTERNAL: 8.2
NEUTROPHILS, EXTERNAL: 7.3
NONHDLC SERPL-MCNC: ABNORMAL MG/DL
PLATELETS, EXTERNAL: 297
POTASSIUM, EXTERNAL: 4.7
PROTEIN TOT, EXTERNAL: 7.1
RBC, EXTERNAL: 4.83
RDW, EXTERNAL: 13.4
SGOT (AST), EXTERNAL: 39
SGPT (ALT), EXTERNAL: 30
SODIUM, EXTERNAL: 141
TRIGL SERPL-MCNC: 116 MG/DL
VLDLC SERPL CALC-MCNC: ABNORMAL MG/DL
WBC, EXTERNAL: 10.8

## 2025-06-24 ENCOUNTER — OFFICE VISIT (OUTPATIENT)
Dept: ORTHOPEDIC SURGERY | Age: 75
End: 2025-06-24
Payer: MEDICARE

## 2025-06-24 VITALS
BODY MASS INDEX: 32.73 KG/M2 | RESPIRATION RATE: 20 BRPM | DIASTOLIC BLOOD PRESSURE: 82 MMHG | OXYGEN SATURATION: 93 % | SYSTOLIC BLOOD PRESSURE: 131 MMHG | TEMPERATURE: 97.3 F | HEIGHT: 69 IN | HEART RATE: 78 BPM | WEIGHT: 221 LBS

## 2025-06-24 DIAGNOSIS — M25.511 RIGHT SHOULDER PAIN, UNSPECIFIED CHRONICITY: ICD-10-CM

## 2025-06-24 DIAGNOSIS — M19.012 PRIMARY OSTEOARTHRITIS OF LEFT SHOULDER: ICD-10-CM

## 2025-06-24 DIAGNOSIS — M25.512 ACUTE PAIN OF LEFT SHOULDER: Primary | ICD-10-CM

## 2025-06-24 PROCEDURE — 1123F ACP DISCUSS/DSCN MKR DOCD: CPT | Performed by: NURSE PRACTITIONER

## 2025-06-24 PROCEDURE — 99213 OFFICE O/P EST LOW 20 MIN: CPT | Performed by: NURSE PRACTITIONER

## 2025-06-24 PROCEDURE — 20611 DRAIN/INJ JOINT/BURSA W/US: CPT | Performed by: NURSE PRACTITIONER

## 2025-06-24 PROCEDURE — 3079F DIAST BP 80-89 MM HG: CPT | Performed by: NURSE PRACTITIONER

## 2025-06-24 PROCEDURE — 3075F SYST BP GE 130 - 139MM HG: CPT | Performed by: NURSE PRACTITIONER

## 2025-06-24 PROCEDURE — 1159F MED LIST DOCD IN RCRD: CPT | Performed by: NURSE PRACTITIONER

## 2025-06-24 RX ORDER — BUPIVACAINE HYDROCHLORIDE 2.5 MG/ML
2 INJECTION, SOLUTION INFILTRATION; PERINEURAL ONCE
Status: COMPLETED | OUTPATIENT
Start: 2025-06-24 | End: 2025-06-24

## 2025-06-24 RX ORDER — BUPIVACAINE HYDROCHLORIDE 2.5 MG/ML
3 INJECTION, SOLUTION INFILTRATION; PERINEURAL ONCE
Status: COMPLETED | OUTPATIENT
Start: 2025-06-24 | End: 2025-06-24

## 2025-06-24 RX ORDER — TRIAMCINOLONE ACETONIDE 40 MG/ML
40 INJECTION, SUSPENSION INTRA-ARTICULAR; INTRAMUSCULAR ONCE
Status: COMPLETED | OUTPATIENT
Start: 2025-06-24 | End: 2025-06-24

## 2025-06-24 RX ADMIN — BUPIVACAINE HYDROCHLORIDE 7.5 MG: 2.5 INJECTION, SOLUTION INFILTRATION; PERINEURAL at 15:35

## 2025-06-24 RX ADMIN — TRIAMCINOLONE ACETONIDE 40 MG: 40 INJECTION, SUSPENSION INTRA-ARTICULAR; INTRAMUSCULAR at 15:36

## 2025-06-24 RX ADMIN — BUPIVACAINE HYDROCHLORIDE 5 MG: 2.5 INJECTION, SOLUTION INFILTRATION; PERINEURAL at 15:35

## 2025-06-24 NOTE — PROGRESS NOTES
for evaluation of left shoulder pain.  History, provider note, physical exam and imaging were reviewed.  X-ray of the left shoulder showing advancing glenohumeral osteoarthritis of the left shoulder.  Treatment options discussed with patient in the office today including activity modification, oral/topical anti-inflammatories, physical therapy/home exercises, injection options, advanced imaging, and surgical management discussed. Patient wishes to proceed with conservative treatment in the form of a ultrasound-guided left shoulder intra-articular corticosteroid injection which was performed in the office today, please see separate procedure note for further details.  Patient will call to schedule follow-up appointment when symptoms return.      LEROY Guzman-CNP  Orthopaedic Surgery   6/24/25  3:46 PM

## 2025-06-24 NOTE — PROGRESS NOTES
and avoid soaking the shoulder under water for two days. ?The patient will contact me with any problems related to the injection.     PATIENT EDUCATION     Ready to learn, no apparent learning barriers were identified; learning preferences include listening. ?Explained diagnosis and treatment plan; patient expressed understanding of the content.     INFORMED CONSENT     Discussed the risks, benefits, alternatives, and the necessity of other members of the healthcare team participating in the procedure. ?All questions answered and consent given.     Following denial of allergy and review of potential side effects and complications including but not necessarily limited to infection, allergic reaction, local tissue breakdown, aseptic effusion potentially necessitating aspiration and corticosteroid injection, elevation of blood glucose, injury to soft tissue and/or nerves, and seizure, the patient indicated their understanding and agreed to proceed.    FOLLOW UP    Follow up in 3 months      SHYLA Guzman  Orthopedic Surgery   06/24/25  3:47 PM

## 2025-07-08 ENCOUNTER — TELEPHONE (OUTPATIENT)
Dept: FAMILY MEDICINE CLINIC | Age: 75
End: 2025-07-08

## 2025-07-08 DIAGNOSIS — R23.8 OTHER SKIN CHANGES: Primary | ICD-10-CM

## 2025-07-08 NOTE — TELEPHONE ENCOUNTER
No [facility] provider     Orders Placed This Encounter    External Referral To Dermatology     Referral Priority:   Routine     Referral Type:   Eval and Treat     Referral Reason:   Specialty Services Required     Referred to Provider:   Eze Epps MD     Requested Specialty:   Dermatology     Number of Visits Requested:   1     Referral to advanced dermatology

## 2025-07-10 DIAGNOSIS — R23.8 OTHER SKIN CHANGES: Primary | ICD-10-CM

## (undated) DEVICE — LENS MICSCP ENH W FLD BIOM

## (undated) DEVICE — 3M™ COBAN™ NL STERILE NON-LATEX SELF-ADHERENT WRAP, 2084S, 4 IN X 5 YD (10 CM X 4,5 M), 18 ROLLS/CASE: Brand: 3M™ COBAN™

## (undated) DEVICE — BIT DRILL 3.0

## (undated) DEVICE — BLADE,STAINLESS-STEEL,10,STRL,DISPOSABLE: Brand: MEDLINE

## (undated) DEVICE — TOWEL,OR,DSP,ST,BLUE,STD,6/PK,12PK/CS: Brand: MEDLINE

## (undated) DEVICE — 3M™ STERI-DRAPE™ U-DRAPE 1015: Brand: STERI-DRAPE™

## (undated) DEVICE — STANDARD HYPODERMIC NEEDLE,POLYPROPYLENE HUB: Brand: MONOJECT

## (undated) DEVICE — SYRINGE INFL 60ML DISP ALLIANCE II

## (undated) DEVICE — FORCEP BX 2.2 MMX240 CM CHANNEL SERRATED RADIAL JAW 4

## (undated) DEVICE — STOP COCK W/ ROTATING LOCK

## (undated) DEVICE — GOWN,SIRUS,FABRNF,L,20/CS: Brand: MEDLINE

## (undated) DEVICE — GAUZE,SPONGE,4"X4",8PLY,STRL,LF,10/TRAY: Brand: MEDLINE

## (undated) DEVICE — FORCEPS BX OVL CUP FEN DISPOSABLE CAP L 160CM RAD JAW 4

## (undated) DEVICE — CANNULA NSL ORAL AD FOR CAPNOFLEX CO2 O2 AIRLFE

## (undated) DEVICE — SYRINGE MED 50ML LUERLOCK TIP

## (undated) DEVICE — PIN GLENOID DYNANITE VIP 2.8MM

## (undated) DEVICE — WAX SURG 2.5GM HEMSTAT BNE BEESWAX PARAFFIN ISO PALMITATE

## (undated) DEVICE — GLOVE SURG SZ 8 CRM LTX FREE POLYISOPRENE POLYMER BEAD ANTI

## (undated) DEVICE — BIOM OPTIC SET DISPOSABLE, WITH BIOM HD FLEX LENS FOR F=175 MM OR F=200 MM: Brand: BIOM OPTIC SET DISPOSABLE, WITH BIOM HD FLEX LENS FOR F=175 MM OR F=200 MM

## (undated) DEVICE — SPONGE GZ W4XL4IN RAYON POLY CVR W/NONWOVEN FAB STRL 2/PK

## (undated) DEVICE — 1000 S-DRAPE TOWEL DRAPE 10/BX: Brand: STERI-DRAPE™

## (undated) DEVICE — CONVERTORS STOCKINETTE: Brand: CONVERTORS

## (undated) DEVICE — SUTURE VCRL L48IN 0 VLT BRAID CART ABSRB CNPJ48ZC

## (undated) DEVICE — 2108 SERIES SAGITTAL BLADE, OFFSET (20.0 X 0.89 X 80.0MM)

## (undated) DEVICE — IMMOBILIZER SHLDR L10.5-17IN D7IN SLNG W/ 15DEG ABD PLLW

## (undated) DEVICE — COVER,BOOT,FOAM,NON-SKID,HOOK-LOOP,XLG: Brand: MEDLINE INDUSTRIES, INC.

## (undated) DEVICE — BLOCK BITE 60FR RUBBER ADLT DENTAL

## (undated) DEVICE — FORCEPS BX L240CM JAW DIA2.4MM ORNG L CAP W/ NDL DISP RAD

## (undated) DEVICE — SUTURE SUTTAPE L40IN DIA1.3MM NONABSORBABLE WHT BLU L26.5MM AR7500

## (undated) DEVICE — GOWN,SIRUS,FABRNF,XL,20/CS: Brand: MEDLINE

## (undated) DEVICE — GAUZE,SPONGE,POST-OP,4X3,STRL,LF: Brand: MEDLINE

## (undated) DEVICE — 4-PORT MANIFOLD: Brand: NEPTUNE 2

## (undated) DEVICE — SPONGE GZ W4XL4IN RAYON POLY FILL CVR W/ NONWOVEN FAB

## (undated) DEVICE — INTENT TO BE USED WITH SUTURE MATERIAL FOR TISSUE CLOSURE: Brand: RICHARD-ALLAN® NEEDLE 1/2 CIRCLE TAPER

## (undated) DEVICE — Device: Brand: 25-27G BRITELIGHT™ ANGLED 20° ENDOPROBE® BOX OF 6

## (undated) DEVICE — SHIELD EYE W3XL2.5IN UNIV CLR PLAS LTWT

## (undated) DEVICE — DRAPE,REIN 53X77,STERILE: Brand: MEDLINE

## (undated) DEVICE — ELECTRODE PT RET AD L9FT HI MOIST COND ADH HYDRGEL CORDED

## (undated) DEVICE — PACK PROCEDURE SURG RETINAL ST ES CUST

## (undated) DEVICE — TAPE ADH W1INXL10YD PLAS TRNSPAR H2O RESIST HYPOALRG CURAD

## (undated) DEVICE — TAPE ADH W1INXL10YD WHT PAPR GENTLE BRTH FLX COMFORTABLE

## (undated) DEVICE — PACK,SHOULDER II,SIRUS: Brand: MEDLINE

## (undated) DEVICE — TAPE ADH W2INXL10YD PLAS TRNSPAR H2O RESIST HYPOALRG CURAD

## (undated) DEVICE — GRADUATE TRIANG MEASURE 1000ML BLK PRNT

## (undated) DEVICE — NEEDLE FLTR 18GA L1.5IN MEM THK5UM BLNT DISP

## (undated) DEVICE — TUBING, SUCTION, 9/32" X 10', STRAIGHT: Brand: MEDLINE

## (undated) DEVICE — Device

## (undated) DEVICE — BITEBLOCK 54FR W/ DENT RIM BLOX

## (undated) DEVICE — 3M™ TEGADERM™ TRANSPARENT FILM DRESSING FRAME STYLE, 1626W, 4 IN X 4-3/4 IN (10 CM X 12 CM), 50/CT 4CT/CASE: Brand: 3M™ TEGADERM™

## (undated) DEVICE — SYRINGE, LUER LOCK, 10ML: Brand: MEDLINE

## (undated) DEVICE — GLOVE ORTHO 8   MSG9480

## (undated) DEVICE — STRIP,CLOSURE,WOUND,MEDI-STRIP,1/2X4: Brand: MEDLINE

## (undated) DEVICE — ESOPHAGEAL/PYLORIC/COLONIC/BILIARY WIREGUIDED BALLOON DILATATION CATHETER: Brand: CRE™ PRO

## (undated) DEVICE — APPLICATOR MEDICATED 26 CC SOLUTION HI LT ORNG CHLORAPREP

## (undated) DEVICE — SYRINGE IRRIG 60ML SFT PLIABLE BLB EZ TO GRP 1 HND USE W/

## (undated) DEVICE — SURGICAL PROCEDURE PACK 25 GA VLV TOT +

## (undated) DEVICE — BRACELET ID ALERT FOR PT INFO ISPAN

## (undated) DEVICE — GOWN,SIRUS,POLYRNF,BRTHSLV,XLN/XL,20/CS: Brand: MEDLINE

## (undated) DEVICE — SHOULDER STABILIZATION KIT,                                    DISPOSABLE 12 PER BOX

## (undated) DEVICE — FORCEPS BX L160CM JAW DIA2.4MM YEL L CAP W/ NDL DISP RAD

## (undated) DEVICE — DEFENDO AIR WATER SUCTION AND BIOPSY VALVE KIT FOR  OLYMPUS: Brand: DEFENDO AIR/WATER/SUCTION AND BIOPSY VALVE

## (undated) DEVICE — Z DUP USE 2522782 SOLUTION IRRIG 1000ML STRL H2O PLAS CONTAINER UROMATIC

## (undated) DEVICE — SOLUTION IV IRRIG POUR BRL 0.9% SODIUM CHL 2F7124

## (undated) DEVICE — ESOPHAGEAL/PYLORIC WIREGUIDED BALLOON DILATATION CATHETER: Brand: CRE WIREGUIDED

## (undated) DEVICE — LIQUIBAND RAPID ADHESIVE 36/CS 0.8ML: Brand: MEDLINE

## (undated) DEVICE — SPONGE LAP W18XL18IN WHT COT 4 PLY FLD STRUNG RADPQ DISP ST 2 PER PACK

## (undated) DEVICE — SET SCLERAL BUCKLE INSTR

## (undated) DEVICE — ALCOHOL RUBBING ISO 16OZ 70%

## (undated) DEVICE — REAMER ANGLED SMALL

## (undated) DEVICE — PACK PROCEDURE SURG GEN CUST

## (undated) DEVICE — 25 MM 1.2 MICRON SYRINGE FILTER: Brand: SUPOR

## (undated) DEVICE — DRAPE MICSCP FULL FLD STRL OCULUS ZEISS

## (undated) DEVICE — DRESSING HYDROFIBER AQUACEL AG ADVANTAGE 3.5X10 IN

## (undated) DEVICE — 3M™ IOBAN™ 2 ANTIMICROBIAL INCISE DRAPE 6650EZ: Brand: IOBAN™ 2

## (undated) DEVICE — Device: Brand: BALLOON3

## (undated) DEVICE — PAD,ABDOMINAL,5"X9",ST,LF,25/BX: Brand: MEDLINE INDUSTRIES, INC.

## (undated) DEVICE — T-MAX DISPOSABLE FACE MASK 8 PER BOX

## (undated) DEVICE — DOUBLE BASIN SET: Brand: MEDLINE INDUSTRIES, INC.

## (undated) DEVICE — SYRINGE, LUER LOCK, 5ML: Brand: MEDLINE

## (undated) DEVICE — DRAPE,U/ SHT,SPLIT,PLAS,STERIL: Brand: MEDLINE

## (undated) DEVICE — FORMALIN  10%NBF 60ML PREFLL CONT

## (undated) DEVICE — CAUTERY BPLR 25GA INTOCU W/ HNDPC CRD DISP FOR CX9404

## (undated) DEVICE — BLADE CLIPPER GEN PURP NS

## (undated) DEVICE — SOLUTION IV IRRIG WATER 1000ML POUR BRL 2F7114

## (undated) DEVICE — CANNULA INJ 25GA SFT TIP DISP